# Patient Record
Sex: MALE | Race: WHITE | NOT HISPANIC OR LATINO | Employment: OTHER | URBAN - METROPOLITAN AREA
[De-identification: names, ages, dates, MRNs, and addresses within clinical notes are randomized per-mention and may not be internally consistent; named-entity substitution may affect disease eponyms.]

---

## 2019-04-04 ENCOUNTER — HOSPITAL ENCOUNTER (EMERGENCY)
Facility: HOSPITAL | Age: 69
Discharge: HOME | End: 2019-04-04
Attending: EMERGENCY MEDICINE
Payer: COMMERCIAL

## 2019-04-04 ENCOUNTER — TELEPHONE (OUTPATIENT)
Dept: PHYSICAL THERAPY | Facility: OTHER | Age: 69
End: 2019-04-04

## 2019-04-04 VITALS
DIASTOLIC BLOOD PRESSURE: 78 MMHG | WEIGHT: 165 LBS | SYSTOLIC BLOOD PRESSURE: 145 MMHG | RESPIRATION RATE: 18 BRPM | HEART RATE: 71 BPM | TEMPERATURE: 97.9 F | OXYGEN SATURATION: 99 %

## 2019-04-04 DIAGNOSIS — M54.31 SCIATICA OF RIGHT SIDE: Primary | ICD-10-CM

## 2019-04-04 PROCEDURE — 99283 EMERGENCY DEPT VISIT LOW MDM: CPT

## 2019-04-04 PROCEDURE — 99284 EMERGENCY DEPT VISIT MOD MDM: CPT | Performed by: PHYSICIAN ASSISTANT

## 2019-04-04 RX ORDER — CYCLOBENZAPRINE HCL 10 MG
10 TABLET ORAL ONCE
Status: COMPLETED | OUTPATIENT
Start: 2019-04-04 | End: 2019-04-04

## 2019-04-04 RX ORDER — CYCLOBENZAPRINE HCL 5 MG
TABLET ORAL
Qty: 12 TABLET | Refills: 0 | Status: SHIPPED | OUTPATIENT
Start: 2019-04-04 | End: 2019-04-08 | Stop reason: ALTCHOICE

## 2019-04-04 RX ORDER — PREDNISONE 20 MG/1
60 TABLET ORAL ONCE
Status: COMPLETED | OUTPATIENT
Start: 2019-04-04 | End: 2019-04-04

## 2019-04-04 RX ORDER — PREDNISONE 20 MG/1
40 TABLET ORAL DAILY
Qty: 8 TABLET | Refills: 0 | Status: SHIPPED | OUTPATIENT
Start: 2019-04-04 | End: 2019-04-08 | Stop reason: ALTCHOICE

## 2019-04-04 RX ADMIN — PREDNISONE 60 MG: 20 TABLET ORAL at 13:50

## 2019-04-04 RX ADMIN — CYCLOBENZAPRINE HYDROCHLORIDE 10 MG: 10 TABLET, FILM COATED ORAL at 13:50

## 2019-04-05 ENCOUNTER — TELEPHONE (OUTPATIENT)
Dept: PHYSICAL THERAPY | Facility: OTHER | Age: 69
End: 2019-04-05

## 2019-04-08 ENCOUNTER — OFFICE VISIT (OUTPATIENT)
Dept: INTERNAL MEDICINE CLINIC | Facility: CLINIC | Age: 69
End: 2019-04-08
Payer: COMMERCIAL

## 2019-04-08 ENCOUNTER — EVALUATION (OUTPATIENT)
Dept: PHYSICAL THERAPY | Facility: REHABILITATION | Age: 69
End: 2019-04-08
Payer: COMMERCIAL

## 2019-04-08 ENCOUNTER — TELEPHONE (OUTPATIENT)
Dept: PHYSICAL THERAPY | Facility: OTHER | Age: 69
End: 2019-04-08

## 2019-04-08 ENCOUNTER — NURSE TRIAGE (OUTPATIENT)
Dept: PHYSICAL THERAPY | Facility: OTHER | Age: 69
End: 2019-04-08

## 2019-04-08 ENCOUNTER — TRANSCRIBE ORDERS (OUTPATIENT)
Dept: PHYSICAL THERAPY | Facility: REHABILITATION | Age: 69
End: 2019-04-08

## 2019-04-08 VITALS
HEIGHT: 69 IN | SYSTOLIC BLOOD PRESSURE: 98 MMHG | OXYGEN SATURATION: 97 % | TEMPERATURE: 98.4 F | WEIGHT: 199 LBS | BODY MASS INDEX: 29.47 KG/M2 | DIASTOLIC BLOOD PRESSURE: 58 MMHG | HEART RATE: 89 BPM

## 2019-04-08 VITALS — TEMPERATURE: 98.6 F | SYSTOLIC BLOOD PRESSURE: 129 MMHG | DIASTOLIC BLOOD PRESSURE: 71 MMHG | HEART RATE: 89 BPM

## 2019-04-08 DIAGNOSIS — M15.3 POST-TRAUMATIC OSTEOARTHRITIS OF MULTIPLE JOINTS: Primary | ICD-10-CM

## 2019-04-08 DIAGNOSIS — M54.41 ACUTE RIGHT-SIDED LOW BACK PAIN WITH RIGHT-SIDED SCIATICA: Primary | ICD-10-CM

## 2019-04-08 DIAGNOSIS — M25.551 RIGHT HIP PAIN: ICD-10-CM

## 2019-04-08 PROCEDURE — 97162 PT EVAL MOD COMPLEX 30 MIN: CPT | Performed by: PHYSICAL THERAPIST

## 2019-04-08 PROCEDURE — 99203 OFFICE O/P NEW LOW 30 MIN: CPT | Performed by: INTERNAL MEDICINE

## 2019-04-08 PROCEDURE — 97110 THERAPEUTIC EXERCISES: CPT | Performed by: PHYSICAL THERAPIST

## 2019-04-08 PROCEDURE — 3008F BODY MASS INDEX DOCD: CPT | Performed by: INTERNAL MEDICINE

## 2019-04-08 RX ORDER — DILTIAZEM HYDROCHLORIDE 180 MG/1
180 CAPSULE, COATED, EXTENDED RELEASE ORAL DAILY
COMMUNITY
Start: 2013-09-12 | End: 2020-02-07 | Stop reason: SDUPTHER

## 2019-04-09 ENCOUNTER — TELEPHONE (OUTPATIENT)
Dept: INTERNAL MEDICINE CLINIC | Facility: CLINIC | Age: 69
End: 2019-04-09

## 2019-04-10 DIAGNOSIS — G89.29 OTHER CHRONIC PAIN: ICD-10-CM

## 2019-04-10 DIAGNOSIS — M19.90 OSTEOARTHRITIS, UNSPECIFIED OSTEOARTHRITIS TYPE, UNSPECIFIED SITE: Primary | ICD-10-CM

## 2019-04-10 DIAGNOSIS — M25.551 RIGHT HIP PAIN: ICD-10-CM

## 2019-04-10 RX ORDER — HYDROCODONE BITARTRATE AND ACETAMINOPHEN 5; 325 MG/1; MG/1
1 TABLET ORAL EVERY 6 HOURS PRN
Qty: 120 TABLET | Refills: 0 | Status: SHIPPED | OUTPATIENT
Start: 2019-04-10 | End: 2019-04-15 | Stop reason: ALTCHOICE

## 2019-04-11 ENCOUNTER — TELEPHONE (OUTPATIENT)
Dept: INTERNAL MEDICINE CLINIC | Age: 69
End: 2019-04-11

## 2019-04-15 ENCOUNTER — APPOINTMENT (OUTPATIENT)
Dept: PHYSICAL THERAPY | Facility: REHABILITATION | Age: 69
End: 2019-04-15
Payer: COMMERCIAL

## 2019-04-15 ENCOUNTER — OFFICE VISIT (OUTPATIENT)
Dept: INTERNAL MEDICINE CLINIC | Facility: CLINIC | Age: 69
End: 2019-04-15
Payer: MEDICARE

## 2019-04-15 VITALS
DIASTOLIC BLOOD PRESSURE: 60 MMHG | WEIGHT: 190.4 LBS | BODY MASS INDEX: 28.3 KG/M2 | HEART RATE: 75 BPM | OXYGEN SATURATION: 95 % | SYSTOLIC BLOOD PRESSURE: 120 MMHG | TEMPERATURE: 98.7 F

## 2019-04-15 DIAGNOSIS — E11.8 TYPE 2 DIABETES MELLITUS WITH COMPLICATION, WITH LONG-TERM CURRENT USE OF INSULIN (HCC): ICD-10-CM

## 2019-04-15 DIAGNOSIS — Z23 NEED FOR DIPHTHERIA-TETANUS-PERTUSSIS (TDAP) VACCINE: ICD-10-CM

## 2019-04-15 DIAGNOSIS — I10 ESSENTIAL HYPERTENSION: ICD-10-CM

## 2019-04-15 DIAGNOSIS — Z79.4 TYPE 2 DIABETES MELLITUS WITH COMPLICATION, WITH LONG-TERM CURRENT USE OF INSULIN (HCC): ICD-10-CM

## 2019-04-15 DIAGNOSIS — Z23 NEED FOR INFLUENZA VACCINATION: ICD-10-CM

## 2019-04-15 DIAGNOSIS — M25.551 RIGHT HIP PAIN: ICD-10-CM

## 2019-04-15 DIAGNOSIS — E11.8 TYPE 2 DIABETES MELLITUS WITH COMPLICATION, WITHOUT LONG-TERM CURRENT USE OF INSULIN (HCC): Primary | ICD-10-CM

## 2019-04-15 DIAGNOSIS — Z23 NEED FOR PNEUMOCOCCAL VACCINE: ICD-10-CM

## 2019-04-15 DIAGNOSIS — G89.21 CHRONIC PAIN DUE TO TRAUMA: ICD-10-CM

## 2019-04-15 DIAGNOSIS — Z12.11 COLON CANCER SCREENING: ICD-10-CM

## 2019-04-15 LAB — SL AMB POCT HEMOGLOBIN AIC: 9 (ref ?–6.5)

## 2019-04-15 PROCEDURE — 90670 PCV13 VACCINE IM: CPT

## 2019-04-15 PROCEDURE — 83036 HEMOGLOBIN GLYCOSYLATED A1C: CPT | Performed by: INTERNAL MEDICINE

## 2019-04-15 PROCEDURE — 99214 OFFICE O/P EST MOD 30 MIN: CPT | Performed by: INTERNAL MEDICINE

## 2019-04-15 PROCEDURE — 90471 IMMUNIZATION ADMIN: CPT

## 2019-04-15 RX ORDER — TRAMADOL HYDROCHLORIDE 50 MG/1
50 TABLET ORAL EVERY 6 HOURS PRN
Qty: 120 TABLET | Refills: 0 | Status: SHIPPED | OUTPATIENT
Start: 2019-04-15 | End: 2019-05-20 | Stop reason: SDUPTHER

## 2019-04-15 RX ORDER — DULOXETIN HYDROCHLORIDE 20 MG/1
20 CAPSULE, DELAYED RELEASE ORAL DAILY
Qty: 30 CAPSULE | Refills: 2 | Status: SHIPPED | OUTPATIENT
Start: 2019-04-15 | End: 2019-07-20 | Stop reason: SDUPTHER

## 2019-04-16 ENCOUNTER — APPOINTMENT (OUTPATIENT)
Dept: RADIOLOGY | Facility: CLINIC | Age: 69
End: 2019-04-16
Payer: COMMERCIAL

## 2019-04-16 DIAGNOSIS — M25.551 RIGHT HIP PAIN: ICD-10-CM

## 2019-04-16 DIAGNOSIS — G89.21 CHRONIC PAIN DUE TO TRAUMA: ICD-10-CM

## 2019-04-16 PROCEDURE — 73502 X-RAY EXAM HIP UNI 2-3 VIEWS: CPT

## 2019-04-17 ENCOUNTER — TELEPHONE (OUTPATIENT)
Dept: INTERNAL MEDICINE CLINIC | Facility: CLINIC | Age: 69
End: 2019-04-17

## 2019-04-17 DIAGNOSIS — Z12.11 COLON CANCER SCREENING: Primary | ICD-10-CM

## 2019-04-17 DIAGNOSIS — Z12.10 SPECIAL SCREENING FOR MALIGNANT NEOPLASM OF INTESTINE: ICD-10-CM

## 2019-04-18 LAB
ALBUMIN SERPL-MCNC: 4.1 G/DL (ref 3.6–5.1)
ALBUMIN/GLOB SERPL: 1.5 (CALC) (ref 1–2.5)
ALP SERPL-CCNC: 61 U/L (ref 40–115)
ALT SERPL-CCNC: 17 U/L (ref 9–46)
AST SERPL-CCNC: 12 U/L (ref 10–35)
BASOPHILS # BLD AUTO: 73 CELLS/UL (ref 0–200)
BASOPHILS NFR BLD AUTO: 1.1 %
BILIRUB SERPL-MCNC: 1 MG/DL (ref 0.2–1.2)
BUN SERPL-MCNC: 15 MG/DL (ref 7–25)
BUN/CREAT SERPL: ABNORMAL (CALC) (ref 6–22)
CALCIUM SERPL-MCNC: 9.3 MG/DL (ref 8.6–10.3)
CHLORIDE SERPL-SCNC: 99 MMOL/L (ref 98–110)
CHOLEST SERPL-MCNC: 214 MG/DL
CHOLEST/HDLC SERPL: 4.7 (CALC)
CO2 SERPL-SCNC: 27 MMOL/L (ref 20–32)
CREAT SERPL-MCNC: 0.82 MG/DL (ref 0.7–1.18)
EOSINOPHIL # BLD AUTO: 132 CELLS/UL (ref 15–500)
EOSINOPHIL NFR BLD AUTO: 2 %
ERYTHROCYTE [DISTWIDTH] IN BLOOD BY AUTOMATED COUNT: 13.3 % (ref 11–15)
GLOBULIN SER CALC-MCNC: 2.8 G/DL (CALC) (ref 1.9–3.7)
GLUCOSE SERPL-MCNC: 193 MG/DL (ref 65–99)
HBA1C MFR BLD: 8.7 % OF TOTAL HGB
HCT VFR BLD AUTO: 46.2 % (ref 38.5–50)
HDLC SERPL-MCNC: 46 MG/DL
HGB BLD-MCNC: 15.6 G/DL (ref 13.2–17.1)
LDLC SERPL CALC-MCNC: 146 MG/DL (CALC)
LYMPHOCYTES # BLD AUTO: 1993 CELLS/UL (ref 850–3900)
LYMPHOCYTES NFR BLD AUTO: 30.2 %
MCH RBC QN AUTO: 31.4 PG (ref 27–33)
MCHC RBC AUTO-ENTMCNC: 33.8 G/DL (ref 32–36)
MCV RBC AUTO: 93 FL (ref 80–100)
MONOCYTES # BLD AUTO: 554 CELLS/UL (ref 200–950)
MONOCYTES NFR BLD AUTO: 8.4 %
NEUTROPHILS # BLD AUTO: 3848 CELLS/UL (ref 1500–7800)
NEUTROPHILS NFR BLD AUTO: 58.3 %
NONHDLC SERPL-MCNC: 168 MG/DL (CALC)
PLATELET # BLD AUTO: 175 THOUSAND/UL (ref 140–400)
PMV BLD REES-ECKER: 12.2 FL (ref 7.5–12.5)
POTASSIUM SERPL-SCNC: 4.2 MMOL/L (ref 3.5–5.3)
PROT SERPL-MCNC: 6.9 G/DL (ref 6.1–8.1)
RBC # BLD AUTO: 4.97 MILLION/UL (ref 4.2–5.8)
SL AMB EGFR AFRICAN AMERICAN: 104 ML/MIN/1.73M2
SL AMB EGFR NON AFRICAN AMERICAN: 90 ML/MIN/1.73M2
SODIUM SERPL-SCNC: 135 MMOL/L (ref 135–146)
TRIGL SERPL-MCNC: 108 MG/DL
WBC # BLD AUTO: 6.6 THOUSAND/UL (ref 3.8–10.8)

## 2019-04-22 ENCOUNTER — OFFICE VISIT (OUTPATIENT)
Dept: PHYSICAL THERAPY | Facility: REHABILITATION | Age: 69
End: 2019-04-22
Payer: COMMERCIAL

## 2019-04-22 DIAGNOSIS — M54.41 ACUTE RIGHT-SIDED LOW BACK PAIN WITH RIGHT-SIDED SCIATICA: Primary | ICD-10-CM

## 2019-04-22 PROCEDURE — 97140 MANUAL THERAPY 1/> REGIONS: CPT | Performed by: PHYSICAL THERAPIST

## 2019-04-22 PROCEDURE — 97110 THERAPEUTIC EXERCISES: CPT | Performed by: PHYSICAL THERAPIST

## 2019-04-22 PROCEDURE — 97112 NEUROMUSCULAR REEDUCATION: CPT | Performed by: PHYSICAL THERAPIST

## 2019-04-23 ENCOUNTER — OFFICE VISIT (OUTPATIENT)
Dept: INTERNAL MEDICINE CLINIC | Facility: CLINIC | Age: 69
End: 2019-04-23
Payer: COMMERCIAL

## 2019-04-23 VITALS
HEART RATE: 84 BPM | WEIGHT: 189 LBS | OXYGEN SATURATION: 95 % | BODY MASS INDEX: 28.09 KG/M2 | TEMPERATURE: 97.9 F | DIASTOLIC BLOOD PRESSURE: 74 MMHG | SYSTOLIC BLOOD PRESSURE: 122 MMHG

## 2019-04-23 DIAGNOSIS — I48.0 PAROXYSMAL ATRIAL FIBRILLATION (HCC): ICD-10-CM

## 2019-04-23 DIAGNOSIS — I10 ESSENTIAL HYPERTENSION: ICD-10-CM

## 2019-04-23 DIAGNOSIS — G89.29 CHRONIC RIGHT SACROILIAC JOINT PAIN: ICD-10-CM

## 2019-04-23 DIAGNOSIS — E11.40 TYPE 2 DIABETES MELLITUS WITH DIABETIC NEUROPATHY, WITHOUT LONG-TERM CURRENT USE OF INSULIN (HCC): Primary | ICD-10-CM

## 2019-04-23 DIAGNOSIS — M53.3 CHRONIC RIGHT SACROILIAC JOINT PAIN: ICD-10-CM

## 2019-04-23 PROCEDURE — 1160F RVW MEDS BY RX/DR IN RCRD: CPT | Performed by: INTERNAL MEDICINE

## 2019-04-23 PROCEDURE — 99213 OFFICE O/P EST LOW 20 MIN: CPT | Performed by: INTERNAL MEDICINE

## 2019-04-23 PROCEDURE — 3078F DIAST BP <80 MM HG: CPT | Performed by: INTERNAL MEDICINE

## 2019-04-23 PROCEDURE — 3074F SYST BP LT 130 MM HG: CPT | Performed by: INTERNAL MEDICINE

## 2019-04-23 RX ORDER — ALPHA LIPOIC ACID 300 MG
1 CAPSULE ORAL DAILY
Qty: 100 CAPSULE | Refills: 1 | Status: SHIPPED | OUTPATIENT
Start: 2019-04-23 | End: 2019-08-07 | Stop reason: SDUPTHER

## 2019-04-29 ENCOUNTER — OFFICE VISIT (OUTPATIENT)
Dept: PHYSICAL THERAPY | Facility: REHABILITATION | Age: 69
End: 2019-04-29
Payer: COMMERCIAL

## 2019-04-29 DIAGNOSIS — M54.41 ACUTE RIGHT-SIDED LOW BACK PAIN WITH RIGHT-SIDED SCIATICA: Primary | ICD-10-CM

## 2019-04-29 PROCEDURE — 97112 NEUROMUSCULAR REEDUCATION: CPT | Performed by: PHYSICAL THERAPIST

## 2019-04-29 PROCEDURE — 97110 THERAPEUTIC EXERCISES: CPT | Performed by: PHYSICAL THERAPIST

## 2019-04-29 PROCEDURE — 97140 MANUAL THERAPY 1/> REGIONS: CPT | Performed by: PHYSICAL THERAPIST

## 2019-05-06 ENCOUNTER — OFFICE VISIT (OUTPATIENT)
Dept: PHYSICAL THERAPY | Facility: REHABILITATION | Age: 69
End: 2019-05-06
Payer: COMMERCIAL

## 2019-05-06 DIAGNOSIS — M54.41 ACUTE RIGHT-SIDED LOW BACK PAIN WITH RIGHT-SIDED SCIATICA: Primary | ICD-10-CM

## 2019-05-06 PROCEDURE — 97140 MANUAL THERAPY 1/> REGIONS: CPT | Performed by: PHYSICAL THERAPIST

## 2019-05-06 PROCEDURE — 97110 THERAPEUTIC EXERCISES: CPT | Performed by: PHYSICAL THERAPIST

## 2019-05-13 LAB
LEFT EYE DIABETIC RETINOPATHY: NORMAL
RIGHT EYE DIABETIC RETINOPATHY: NORMAL

## 2019-05-13 PROCEDURE — 3072F LOW RISK FOR RETINOPATHY: CPT | Performed by: INTERNAL MEDICINE

## 2019-05-15 ENCOUNTER — OFFICE VISIT (OUTPATIENT)
Dept: PHYSICAL THERAPY | Facility: REHABILITATION | Age: 69
End: 2019-05-15
Payer: COMMERCIAL

## 2019-05-15 DIAGNOSIS — M54.41 ACUTE RIGHT-SIDED LOW BACK PAIN WITH RIGHT-SIDED SCIATICA: Primary | ICD-10-CM

## 2019-05-15 PROCEDURE — 97140 MANUAL THERAPY 1/> REGIONS: CPT | Performed by: PHYSICAL THERAPIST

## 2019-05-15 PROCEDURE — 97112 NEUROMUSCULAR REEDUCATION: CPT | Performed by: PHYSICAL THERAPIST

## 2019-05-15 PROCEDURE — 97110 THERAPEUTIC EXERCISES: CPT | Performed by: PHYSICAL THERAPIST

## 2019-05-16 RX ORDER — ALBUTEROL SULFATE 90 UG/1
AEROSOL, METERED RESPIRATORY (INHALATION)
COMMUNITY
Start: 2019-03-18 | End: 2019-05-20 | Stop reason: ALTCHOICE

## 2019-05-20 ENCOUNTER — OFFICE VISIT (OUTPATIENT)
Dept: INTERNAL MEDICINE CLINIC | Facility: CLINIC | Age: 69
End: 2019-05-20
Payer: COMMERCIAL

## 2019-05-20 VITALS
DIASTOLIC BLOOD PRESSURE: 56 MMHG | SYSTOLIC BLOOD PRESSURE: 106 MMHG | OXYGEN SATURATION: 96 % | HEIGHT: 69 IN | BODY MASS INDEX: 27.4 KG/M2 | WEIGHT: 185 LBS | TEMPERATURE: 98.2 F | HEART RATE: 83 BPM

## 2019-05-20 DIAGNOSIS — E11.40 TYPE 2 DIABETES MELLITUS WITH DIABETIC NEUROPATHY, WITHOUT LONG-TERM CURRENT USE OF INSULIN (HCC): Primary | ICD-10-CM

## 2019-05-20 DIAGNOSIS — M19.90 ARTHRITIS: ICD-10-CM

## 2019-05-20 DIAGNOSIS — M25.551 RIGHT HIP PAIN: ICD-10-CM

## 2019-05-20 DIAGNOSIS — G89.21 CHRONIC PAIN DUE TO TRAUMA: ICD-10-CM

## 2019-05-20 DIAGNOSIS — I95.2 HYPOTENSION DUE TO DRUGS: ICD-10-CM

## 2019-05-20 PROCEDURE — 99214 OFFICE O/P EST MOD 30 MIN: CPT | Performed by: INTERNAL MEDICINE

## 2019-05-20 PROCEDURE — 1036F TOBACCO NON-USER: CPT | Performed by: INTERNAL MEDICINE

## 2019-05-20 PROCEDURE — 3008F BODY MASS INDEX DOCD: CPT | Performed by: INTERNAL MEDICINE

## 2019-05-20 RX ORDER — TRAMADOL HYDROCHLORIDE 50 MG/1
50 TABLET ORAL EVERY 6 HOURS PRN
Qty: 120 TABLET | Refills: 0 | Status: SHIPPED | OUTPATIENT
Start: 2019-05-20 | End: 2019-06-19

## 2019-05-22 ENCOUNTER — OFFICE VISIT (OUTPATIENT)
Dept: PHYSICAL THERAPY | Facility: REHABILITATION | Age: 69
End: 2019-05-22
Payer: COMMERCIAL

## 2019-05-22 DIAGNOSIS — M54.41 ACUTE RIGHT-SIDED LOW BACK PAIN WITH RIGHT-SIDED SCIATICA: Primary | ICD-10-CM

## 2019-05-22 LAB — B BURGDOR AB SER IA-ACNC: <0.9 INDEX

## 2019-05-22 PROCEDURE — 97110 THERAPEUTIC EXERCISES: CPT | Performed by: PHYSICAL THERAPIST

## 2019-05-22 PROCEDURE — 97112 NEUROMUSCULAR REEDUCATION: CPT | Performed by: PHYSICAL THERAPIST

## 2019-05-22 PROCEDURE — 97140 MANUAL THERAPY 1/> REGIONS: CPT | Performed by: PHYSICAL THERAPIST

## 2019-05-29 ENCOUNTER — OFFICE VISIT (OUTPATIENT)
Dept: PHYSICAL THERAPY | Facility: REHABILITATION | Age: 69
End: 2019-05-29
Payer: COMMERCIAL

## 2019-05-29 DIAGNOSIS — M54.41 ACUTE RIGHT-SIDED LOW BACK PAIN WITH RIGHT-SIDED SCIATICA: Primary | ICD-10-CM

## 2019-05-29 PROCEDURE — 97110 THERAPEUTIC EXERCISES: CPT | Performed by: PHYSICAL THERAPIST

## 2019-05-29 PROCEDURE — 97140 MANUAL THERAPY 1/> REGIONS: CPT | Performed by: PHYSICAL THERAPIST

## 2019-07-20 DIAGNOSIS — Z79.4 TYPE 2 DIABETES MELLITUS WITH COMPLICATION, WITH LONG-TERM CURRENT USE OF INSULIN (HCC): ICD-10-CM

## 2019-07-20 DIAGNOSIS — G89.21 CHRONIC PAIN DUE TO TRAUMA: ICD-10-CM

## 2019-07-20 DIAGNOSIS — M25.551 RIGHT HIP PAIN: ICD-10-CM

## 2019-07-20 DIAGNOSIS — E11.8 TYPE 2 DIABETES MELLITUS WITH COMPLICATION, WITH LONG-TERM CURRENT USE OF INSULIN (HCC): ICD-10-CM

## 2019-07-22 RX ORDER — DULOXETIN HYDROCHLORIDE 20 MG/1
CAPSULE, DELAYED RELEASE ORAL
Qty: 30 CAPSULE | Refills: 2 | Status: SHIPPED | OUTPATIENT
Start: 2019-07-22 | End: 2019-12-23 | Stop reason: ALTCHOICE

## 2019-08-05 RX ORDER — DILTIAZEM HYDROCHLORIDE 240 MG/1
CAPSULE, COATED, EXTENDED RELEASE ORAL
COMMUNITY
Start: 2019-05-20 | End: 2019-08-07 | Stop reason: SDUPTHER

## 2019-08-05 RX ORDER — CARBOXYMETHYLCELLULOSE SODIUM 0.5 %
DROPS OPHTHALMIC (EYE) AS NEEDED
COMMUNITY
Start: 2019-06-07 | End: 2021-10-12 | Stop reason: ALTCHOICE

## 2019-08-07 ENCOUNTER — OFFICE VISIT (OUTPATIENT)
Dept: INTERNAL MEDICINE CLINIC | Facility: CLINIC | Age: 69
End: 2019-08-07
Payer: COMMERCIAL

## 2019-08-07 VITALS
HEART RATE: 88 BPM | WEIGHT: 195.2 LBS | BODY MASS INDEX: 28.91 KG/M2 | HEIGHT: 69 IN | DIASTOLIC BLOOD PRESSURE: 78 MMHG | TEMPERATURE: 98.6 F | SYSTOLIC BLOOD PRESSURE: 126 MMHG | OXYGEN SATURATION: 98 %

## 2019-08-07 DIAGNOSIS — I48.20 CHRONIC ATRIAL FIBRILLATION (HCC): ICD-10-CM

## 2019-08-07 DIAGNOSIS — I95.2 HYPOTENSION DUE TO DRUGS: ICD-10-CM

## 2019-08-07 DIAGNOSIS — E11.40 TYPE 2 DIABETES MELLITUS WITH DIABETIC NEUROPATHY, WITHOUT LONG-TERM CURRENT USE OF INSULIN (HCC): Primary | ICD-10-CM

## 2019-08-07 DIAGNOSIS — I10 ESSENTIAL HYPERTENSION: ICD-10-CM

## 2019-08-07 LAB — SL AMB POCT HEMOGLOBIN AIC: 6.6 (ref ?–6.5)

## 2019-08-07 PROCEDURE — 3074F SYST BP LT 130 MM HG: CPT | Performed by: INTERNAL MEDICINE

## 2019-08-07 PROCEDURE — 1160F RVW MEDS BY RX/DR IN RCRD: CPT | Performed by: INTERNAL MEDICINE

## 2019-08-07 PROCEDURE — 3008F BODY MASS INDEX DOCD: CPT | Performed by: INTERNAL MEDICINE

## 2019-08-07 PROCEDURE — 99214 OFFICE O/P EST MOD 30 MIN: CPT | Performed by: INTERNAL MEDICINE

## 2019-08-07 PROCEDURE — 83036 HEMOGLOBIN GLYCOSYLATED A1C: CPT | Performed by: INTERNAL MEDICINE

## 2019-08-07 RX ORDER — TRAMADOL HYDROCHLORIDE 50 MG/1
TABLET ORAL
COMMUNITY
End: 2019-12-23 | Stop reason: SDUPTHER

## 2019-08-07 RX ORDER — CARBOXYMETHYLCELLULOSE SODIUM 5 MG/ML
SOLUTION/ DROPS OPHTHALMIC AS NEEDED
COMMUNITY
End: 2019-12-23 | Stop reason: ALTCHOICE

## 2019-08-07 RX ORDER — ALPHA LIPOIC ACID 300 MG
1 CAPSULE ORAL DAILY
Qty: 100 CAPSULE | Refills: 1 | Status: SHIPPED | OUTPATIENT
Start: 2019-08-07 | End: 2019-12-23 | Stop reason: ALTCHOICE

## 2019-08-07 NOTE — ASSESSMENT & PLAN NOTE
Reviewed the rationale of anticoagulation with the patient but he refuses to take systemic anticoagulation a choosing instead to proceed with aspirin alone    It was stated to the patient that aspirin alone is not sufficient protection for stroke prevention in atrial fibrillation

## 2019-08-07 NOTE — PROGRESS NOTES
Assessment/Plan:    Essential hypertension  Blood pressure is nicely controlled on current medications  Chronic atrial fibrillation ([facility])  Reviewed the rationale of anticoagulation with the patient but he refuses to take systemic anticoagulation a choosing instead to proceed with aspirin alone  It was stated to the patient that aspirin alone is not sufficient protection for stroke prevention in atrial fibrillation    Type 2 diabetes mellitus with neurologic complication, without long-term current use of insulin ([facility])  Lab Results   Component Value Date    HGBA1C 6 6 (A) 08/07/2019     Good diabetic control on metformin 1000 twice daily  Patient follows a very prudent diet avoiding sugary snacks, beverages and excessive carbohydrates  No results for input(s): POCGLU in the last 72 hours  Blood Sugar Average: Last 72 hrs:      Hypotension due to drugs  This has resolved with adjustment in his diltiazem dose  We will continue to monitor  Diagnoses and all orders for this visit:    Type 2 diabetes mellitus with diabetic neuropathy, without long-term current use of insulin ([facility])  -     POCT hemoglobin A1c  -     Benfotiamine 150 MG CAPS; Take 1 capsule (150 mg total) by mouth 2 (two) times a day for 180 days  -     Alpha-Lipoic Acid 300 MG CAPS; Take 1 capsule (300 mg total) by mouth daily  -     Comprehensive metabolic panel; Future  -     CBC; Future  -     Hemoglobin A1C; Future  -     Lipid panel; Future    Essential hypertension    Chronic atrial fibrillation (HCC)    Hypotension due to drugs    Other orders  -     Discontinue: DILTIAZEM  MG 24 hr capsule  -     ULTRA FRESH 0 5 % SOLN; as needed   -     metFORMIN (GLUCOPHAGE) 1000 MG tablet;  Take 1,000 mg by mouth 2 (two) times a day   -     traMADol (ULTRAM) 50 mg tablet; tramadol 50 mg tablet   TAKE 1 TABLET EVERY 6 HOURS AS NEEDED FOR MODERATE PAIN FOR UP TO 30 DAYS  -     carboxymethylcellulose 0 5 % SOLN; as needed   -     COENZYME Q10 PO; Take 1 Can by mouth daily          Subjective:      Patient ID: Jarvis Arnold is a 78 y o  male  The patient presents to the office for follow-up visit  In general he is doing well  He was recently seen at the [facility] where his blood pressure was noted to be low  His diltiazem dose was adjusted from 240 to 180 mg daily  His blood pressure appears to have improved some  In reviewing his previous visits his blood pressure had been somewhat inconsistent with readings ranging from 286 systolic to 994 and diastolics ranging from 60 to 80  He had never been symptomatic  He denies any chest pain, palpitations, dyspnea, lightheadedness, unsteadiness, numbness tingling or weakness, slurred speech or visual symptoms  Hemoglobin A1c taken in the office now is 6 6% He is taking metformin 1000 mg b i d  He denies any GI side effects from the metformin  Blood pressure appears to be nicely controlled  Heart rate appears stable as well  He is not on systemic anticoagulation  He had been on warfarin several years ago and had a great deal of difficulty with warfarin and refuses to take any type of anti coagulation other than aspirin        Family History   Problem Relation Age of Onset    Dementia Mother     Pneumonia Father         Acinetobacter    Diabetes Father     Colon cancer Sister     Lung cancer Sister     Breast cancer Sister     Lung cancer Brother      Social History     Socioeconomic History    Marital status: /Civil Union     Spouse name: Not on file    Number of children: Not on file    Years of education: Not on file    Highest education level: Not on file   Occupational History    Not on file   Social Needs    Financial resource strain: Not on file    Food insecurity:     Worry: Not on file     Inability: Not on file    Transportation needs:     Medical: Not on file     Non-medical: Not on file   Tobacco Use    Smoking status: Former Smoker     Types: Cigarettes     Last attempt to quit: 1977     Years since quittin 6    Smokeless tobacco: Never Used   Substance and Sexual Activity    Alcohol use: Not Currently    Drug use: Not Currently    Sexual activity: Not on file   Lifestyle    Physical activity:     Days per week: Not on file     Minutes per session: Not on file    Stress: Not on file   Relationships    Social connections:     Talks on phone: Not on file     Gets together: Not on file     Attends Zoroastrian service: Not on file     Active member of club or organization: Not on file     Attends meetings of clubs or organizations: Not on file     Relationship status: Not on file    Intimate partner violence:     Fear of current or ex partner: Not on file     Emotionally abused: Not on file     Physically abused: Not on file     Forced sexual activity: Not on file   Other Topics Concern    Not on file   Social History Narrative    Not on file     Past Medical History:   Diagnosis Date    Arthritis     Atrial fibrillation ([facility] )     Diabetes 1 5, 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[facility] )     Hepatitis C, acute     HTN (hypertension)     Neuropathy        Current Outpatient Medications:     aspirin 81 MG tablet, Take 162 mg by mouth 2 (two) times a day , Disp: , Rfl:     carboxymethylcellulose 0 5 % SOLN, as needed , Disp: , Rfl:     COENZYME Q10 PO, Take 1 Can by mouth daily, Disp: , Rfl:     diltiazem (CARDIZEM CD) 180 mg 24 hr capsule, Take 240 mg by mouth daily , Disp: , Rfl:     DULoxetine (CYMBALTA) 20 mg capsule, TAKE 1 CAPSULE (20 MG TOTAL) BY MOUTH DAILY, Disp: 30 capsule, Rfl: 2    metFORMIN (GLUCOPHAGE) 1000 MG tablet, Take 1,000 mg by mouth 2 (two) times a day , Disp: , Rfl:     traMADol (ULTRAM) 50 mg tablet, tramadol 50 mg tablet  TAKE 1 TABLET EVERY 6 HOURS AS NEEDED FOR MODERATE PAIN FOR UP TO 30 DAYS, Disp: , Rfl:     ULTRA FRESH 0 5 % SOLN, as needed , Disp: , Rfl:     Alpha-Lipoic Acid 300 MG CAPS, Take 1 capsule (300 mg total) by mouth daily, Disp: 100 capsule, Rfl: 1    Benfotiamine 150 MG CAPS, Take 1 capsule (150 mg total) by mouth 2 (two) times a day for 180 days, Disp: 60 capsule, Rfl: 5  No Known Allergies  Past Surgical History:   Procedure Laterality Date    CYSTOSTOMY W/ BLADDER DILATION      basket extraction of calculus    FEMUR FRACTURE SURGERY      LITHOTRIPSY      PATELLA FRACTURE SURGERY      STONE ANALYSIS (HISTORICAL)           Review of Systems   Constitutional: Negative  Eyes: Negative  Respiratory: Negative  Cardiovascular: Negative  Gastrointestinal: Negative  Endocrine: Negative  Genitourinary: Negative  Musculoskeletal: Positive for gait problem  Skin: Negative  Neurological: Positive for numbness (Mild sensory neuropathy of the lower extremities)  Hematological: Negative  Psychiatric/Behavioral: Negative  Objective:      /78 (BP Location: Left arm, Patient Position: Sitting, Cuff Size: Standard)   Pulse 88   Temp 98 6 °F (37 °C) (Oral)   Ht 5' 8 58" (1 742 m) Comment: with boots  Wt 88 5 kg (195 lb 3 2 oz) Comment: with boots  SpO2 98%   BMI 29 18 kg/m²          Physical Exam   Constitutional: He is oriented to person, place, and time  He appears well-developed and well-nourished  No distress  HENT:   Head: Normocephalic and atraumatic  Mouth/Throat: Oropharynx is clear and moist    Eyes: Pupils are equal, round, and reactive to light  Conjunctivae are normal  No scleral icterus  Neck: Neck supple  No JVD present  Carotid bruit is not present  No tracheal deviation present  No thyromegaly present  Cardiovascular: S1 normal, S2 normal, normal heart sounds and normal pulses  An irregularly irregular rhythm present  PMI is not displaced  No murmur heard  Pulmonary/Chest: Effort normal and breath sounds normal  No respiratory distress  He has no wheezes  He has no rales  Abdominal: Soft  Bowel sounds are normal  He exhibits no distension  There is no tenderness  Musculoskeletal: He exhibits no edema or tenderness  Right-sided back pain much improved   Lymphadenopathy:     He has no cervical adenopathy  Neurological: He is alert and oriented to person, place, and time  No cranial nerve deficit  No focal motor deficits  Skin: Skin is warm  Capillary refill takes less than 2 seconds  He is not diaphoretic  No erythema  No pallor  Vitals reviewed

## 2019-08-09 LAB — MICROALBUMIN UR-MCNC: 1.5 MG/DL

## 2019-08-17 ENCOUNTER — APPOINTMENT (EMERGENCY)
Dept: RADIOLOGY | Facility: HOSPITAL | Age: 69
End: 2019-08-17
Payer: COMMERCIAL

## 2019-08-17 ENCOUNTER — HOSPITAL ENCOUNTER (EMERGENCY)
Facility: HOSPITAL | Age: 69
Discharge: HOME | End: 2019-08-17
Attending: EMERGENCY MEDICINE
Payer: COMMERCIAL

## 2019-08-17 VITALS
HEART RATE: 96 BPM | BODY MASS INDEX: 29.79 KG/M2 | OXYGEN SATURATION: 96 % | DIASTOLIC BLOOD PRESSURE: 78 MMHG | SYSTOLIC BLOOD PRESSURE: 160 MMHG | RESPIRATION RATE: 18 BRPM | TEMPERATURE: 97.6 F | WEIGHT: 199.3 LBS

## 2019-08-17 DIAGNOSIS — E27.8 ADRENAL NODULE (HCC): ICD-10-CM

## 2019-08-17 DIAGNOSIS — R93.2 ABNORMAL FINDINGS ON DIAGNOSTIC IMAGING OF LIVER: ICD-10-CM

## 2019-08-17 DIAGNOSIS — N20.0 RENAL STONE: ICD-10-CM

## 2019-08-17 DIAGNOSIS — R10.9 FLANK PAIN: Primary | ICD-10-CM

## 2019-08-17 DIAGNOSIS — R31.9 HEMATURIA: ICD-10-CM

## 2019-08-17 LAB
ANION GAP SERPL CALCULATED.3IONS-SCNC: 4 MMOL/L (ref 4–13)
BACTERIA UR QL AUTO: ABNORMAL /HPF
BASOPHILS # BLD AUTO: 0.04 THOUSANDS/ΜL (ref 0–0.1)
BASOPHILS NFR BLD AUTO: 1 % (ref 0–1)
BILIRUB UR QL STRIP: NEGATIVE
BUN SERPL-MCNC: 18 MG/DL (ref 5–25)
CALCIUM SERPL-MCNC: 8.8 MG/DL (ref 8.3–10.1)
CHLORIDE SERPL-SCNC: 107 MMOL/L (ref 100–108)
CLARITY UR: ABNORMAL
CO2 SERPL-SCNC: 28 MMOL/L (ref 21–32)
COLOR UR: YELLOW
COLOR, POC: NORMAL
CREAT SERPL-MCNC: 1.08 MG/DL (ref 0.6–1.3)
EOSINOPHIL # BLD AUTO: 0.09 THOUSAND/ΜL (ref 0–0.61)
EOSINOPHIL NFR BLD AUTO: 1 % (ref 0–6)
ERYTHROCYTE [DISTWIDTH] IN BLOOD BY AUTOMATED COUNT: 13.2 % (ref 11.6–15.1)
GFR SERPL CREATININE-BSD FRML MDRD: 69 ML/MIN/1.73SQ M
GLUCOSE SERPL-MCNC: 143 MG/DL (ref 65–140)
GLUCOSE UR STRIP-MCNC: ABNORMAL MG/DL
HCT VFR BLD AUTO: 37.3 % (ref 36.5–49.3)
HGB BLD-MCNC: 12.5 G/DL (ref 12–17)
HGB UR QL STRIP.AUTO: ABNORMAL
HYALINE CASTS #/AREA URNS LPF: ABNORMAL /LPF
IMM GRANULOCYTES # BLD AUTO: 0.03 THOUSAND/UL (ref 0–0.2)
IMM GRANULOCYTES NFR BLD AUTO: 1 % (ref 0–2)
KETONES UR STRIP-MCNC: ABNORMAL MG/DL
LEUKOCYTE ESTERASE UR QL STRIP: NEGATIVE
LYMPHOCYTES # BLD AUTO: 1.84 THOUSANDS/ΜL (ref 0.6–4.47)
LYMPHOCYTES NFR BLD AUTO: 30 % (ref 14–44)
MCH RBC QN AUTO: 32.2 PG (ref 26.8–34.3)
MCHC RBC AUTO-ENTMCNC: 33.5 G/DL (ref 31.4–37.4)
MCV RBC AUTO: 96 FL (ref 82–98)
MONOCYTES # BLD AUTO: 0.53 THOUSAND/ΜL (ref 0.17–1.22)
MONOCYTES NFR BLD AUTO: 9 % (ref 4–12)
NEUTROPHILS # BLD AUTO: 3.7 THOUSANDS/ΜL (ref 1.85–7.62)
NEUTS SEG NFR BLD AUTO: 58 % (ref 43–75)
NITRITE UR QL STRIP: NEGATIVE
NON-SQ EPI CELLS URNS QL MICRO: ABNORMAL /HPF
NRBC BLD AUTO-RTO: 0 /100 WBCS
PH UR STRIP.AUTO: 7 [PH] (ref 4.5–8)
PLATELET # BLD AUTO: 178 THOUSANDS/UL (ref 149–390)
PMV BLD AUTO: 11.4 FL (ref 8.9–12.7)
POTASSIUM SERPL-SCNC: 4.2 MMOL/L (ref 3.5–5.3)
PROT UR STRIP-MCNC: ABNORMAL MG/DL
RBC # BLD AUTO: 3.88 MILLION/UL (ref 3.88–5.62)
RBC #/AREA URNS AUTO: ABNORMAL /HPF
SODIUM SERPL-SCNC: 139 MMOL/L (ref 136–145)
SP GR UR STRIP.AUTO: 1.02 (ref 1–1.03)
UROBILINOGEN UR QL STRIP.AUTO: 0.2 E.U./DL
WBC # BLD AUTO: 6.23 THOUSAND/UL (ref 4.31–10.16)
WBC #/AREA URNS AUTO: ABNORMAL /HPF

## 2019-08-17 PROCEDURE — 99284 EMERGENCY DEPT VISIT MOD MDM: CPT | Performed by: EMERGENCY MEDICINE

## 2019-08-17 PROCEDURE — 74177 CT ABD & PELVIS W/CONTRAST: CPT

## 2019-08-17 PROCEDURE — 81001 URINALYSIS AUTO W/SCOPE: CPT

## 2019-08-17 PROCEDURE — 99284 EMERGENCY DEPT VISIT MOD MDM: CPT

## 2019-08-17 PROCEDURE — 36415 COLL VENOUS BLD VENIPUNCTURE: CPT | Performed by: EMERGENCY MEDICINE

## 2019-08-17 PROCEDURE — 80048 BASIC METABOLIC PNL TOTAL CA: CPT | Performed by: EMERGENCY MEDICINE

## 2019-08-17 PROCEDURE — 85025 COMPLETE CBC W/AUTO DIFF WBC: CPT | Performed by: EMERGENCY MEDICINE

## 2019-08-17 RX ORDER — ACETAMINOPHEN 325 MG/1
975 TABLET ORAL ONCE
Status: COMPLETED | OUTPATIENT
Start: 2019-08-17 | End: 2019-08-17

## 2019-08-17 RX ADMIN — ACETAMINOPHEN 975 MG: 325 TABLET ORAL at 17:42

## 2019-08-17 RX ADMIN — IOHEXOL 100 ML: 350 INJECTION, SOLUTION INTRAVENOUS at 19:15

## 2019-08-17 NOTE — ED NOTES
Pt ringing; "I came here for a CT and I am still waiting " Pt is next for [address] Carito Tineo RN  08/17/19 4445
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
## 2019-09-06 ENCOUNTER — OFFICE VISIT (OUTPATIENT)
Dept: INTERNAL MEDICINE CLINIC | Facility: CLINIC | Age: 69
End: 2019-09-06
Payer: COMMERCIAL

## 2019-09-06 VITALS
HEART RATE: 83 BPM | OXYGEN SATURATION: 98 % | BODY MASS INDEX: 29.6 KG/M2 | DIASTOLIC BLOOD PRESSURE: 80 MMHG | WEIGHT: 198 LBS | SYSTOLIC BLOOD PRESSURE: 122 MMHG | TEMPERATURE: 97.8 F

## 2019-09-06 DIAGNOSIS — E11.40 TYPE 2 DIABETES MELLITUS WITH DIABETIC NEUROPATHY, WITHOUT LONG-TERM CURRENT USE OF INSULIN (HCC): ICD-10-CM

## 2019-09-06 DIAGNOSIS — R31.0 HEMATURIA, GROSS: Primary | ICD-10-CM

## 2019-09-06 LAB — SL AMB POCT HEMOGLOBIN AIC: 6.6 (ref ?–6.5)

## 2019-09-06 PROCEDURE — 83036 HEMOGLOBIN GLYCOSYLATED A1C: CPT | Performed by: INTERNAL MEDICINE

## 2019-09-06 PROCEDURE — 99213 OFFICE O/P EST LOW 20 MIN: CPT | Performed by: INTERNAL MEDICINE
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
## 2019-10-17 ENCOUNTER — OFFICE VISIT (OUTPATIENT)
Dept: INTERNAL MEDICINE CLINIC | Facility: CLINIC | Age: 69
End: 2019-10-17
Payer: COMMERCIAL

## 2019-10-17 VITALS
SYSTOLIC BLOOD PRESSURE: 117 MMHG | BODY MASS INDEX: 29.51 KG/M2 | HEIGHT: 69 IN | OXYGEN SATURATION: 98 % | HEART RATE: 85 BPM | DIASTOLIC BLOOD PRESSURE: 64 MMHG | TEMPERATURE: 98.1 F | WEIGHT: 199.2 LBS

## 2019-10-17 DIAGNOSIS — N20.0 BILATERAL NEPHROLITHIASIS: ICD-10-CM

## 2019-10-17 DIAGNOSIS — G89.29 CHRONIC MIDLINE LOW BACK PAIN WITHOUT SCIATICA: ICD-10-CM

## 2019-10-17 DIAGNOSIS — M54.50 CHRONIC MIDLINE LOW BACK PAIN WITHOUT SCIATICA: ICD-10-CM

## 2019-10-17 DIAGNOSIS — Z86.19 HEPATITIS C VIRUS INFECTION RESOLVED AFTER ANTIVIRAL DRUG THERAPY: ICD-10-CM

## 2019-10-17 DIAGNOSIS — R31.0 HEMATURIA, GROSS: Primary | ICD-10-CM

## 2019-10-17 PROCEDURE — 99214 OFFICE O/P EST MOD 30 MIN: CPT | Performed by: INTERNAL MEDICINE

## 2019-10-17 RX ORDER — FLUTICASONE PROPIONATE 50 MCG
2 SPRAY, SUSPENSION (ML) NASAL DAILY
Refills: 3 | COMMUNITY
Start: 2019-09-27 | End: 2020-10-23 | Stop reason: ALTCHOICE

## 2019-10-17 NOTE — ASSESSMENT & PLAN NOTE
Will continue to monitor  Patient states hematuria is intermittent  He denies any flank pain at this time  May need evaluation by Urology  CT scan in August revealed bilateral nephrolithiasis with noted presence also of a left adrenal nodule  Follow-up is planned in 1 year
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
## 2019-10-17 NOTE — ASSESSMENT & PLAN NOTE
Encouraged the patient to maintain adequate fluid intake    He should contact the office should he experience experiencing any increasingly severe flank pain or worsening hematuria

## 2019-10-17 NOTE — ASSESSMENT & PLAN NOTE
Clinically stable  We will continue to monitor his hepatic function  Recent CT of the abdomen pelvis disclosed a lobular contour to his liver suggestive of possible cirrhosis

## 2019-10-23 LAB
ALBUMIN SERPL-MCNC: 4.2 G/DL (ref 3.6–5.1)
ALBUMIN/GLOB SERPL: 1.6 (CALC) (ref 1–2.5)
ALP SERPL-CCNC: 65 U/L (ref 40–115)
ALT SERPL-CCNC: 21 U/L (ref 9–46)
APPEARANCE UR: CLEAR
AST SERPL-CCNC: 16 U/L (ref 10–35)
BASOPHILS # BLD AUTO: 37 CELLS/UL (ref 0–200)
BASOPHILS NFR BLD AUTO: 0.5 %
BILIRUB SERPL-MCNC: 0.7 MG/DL (ref 0.2–1.2)
BILIRUB UR QL STRIP: NEGATIVE
BUN SERPL-MCNC: 13 MG/DL (ref 7–25)
BUN/CREAT SERPL: ABNORMAL (CALC) (ref 6–22)
CALCIUM SERPL-MCNC: 9.1 MG/DL (ref 8.6–10.3)
CHLORIDE SERPL-SCNC: 102 MMOL/L (ref 98–110)
CO2 SERPL-SCNC: 28 MMOL/L (ref 20–32)
COLOR UR: YELLOW
CREAT SERPL-MCNC: 0.73 MG/DL (ref 0.7–1.18)
EOSINOPHIL # BLD AUTO: 170 CELLS/UL (ref 15–500)
EOSINOPHIL NFR BLD AUTO: 2.3 %
ERYTHROCYTE [DISTWIDTH] IN BLOOD BY AUTOMATED COUNT: 12.6 % (ref 11–15)
GLOBULIN SER CALC-MCNC: 2.7 G/DL (CALC) (ref 1.9–3.7)
GLUCOSE SERPL-MCNC: 120 MG/DL (ref 65–99)
GLUCOSE UR QL STRIP: NEGATIVE
HCT VFR BLD AUTO: 40.7 % (ref 38.5–50)
HGB BLD-MCNC: 13.9 G/DL (ref 13.2–17.1)
HGB UR QL STRIP: NEGATIVE
KETONES UR QL STRIP: ABNORMAL
LEUKOCYTE ESTERASE UR QL STRIP: NEGATIVE
LYMPHOCYTES # BLD AUTO: 1746 CELLS/UL (ref 850–3900)
LYMPHOCYTES NFR BLD AUTO: 23.6 %
MCH RBC QN AUTO: 31 PG (ref 27–33)
MCHC RBC AUTO-ENTMCNC: 34.2 G/DL (ref 32–36)
MCV RBC AUTO: 90.8 FL (ref 80–100)
MONOCYTES # BLD AUTO: 688 CELLS/UL (ref 200–950)
MONOCYTES NFR BLD AUTO: 9.3 %
NEUTROPHILS # BLD AUTO: 4758 CELLS/UL (ref 1500–7800)
NEUTROPHILS NFR BLD AUTO: 64.3 %
NITRITE UR QL STRIP: NEGATIVE
PH UR STRIP: 6.5 [PH] (ref 5–8)
PLATELET # BLD AUTO: 213 THOUSAND/UL (ref 140–400)
PMV BLD REES-ECKER: 11.6 FL (ref 7.5–12.5)
POTASSIUM SERPL-SCNC: 4.4 MMOL/L (ref 3.5–5.3)
PROT SERPL-MCNC: 6.9 G/DL (ref 6.1–8.1)
PROT UR QL STRIP: NEGATIVE
RBC # BLD AUTO: 4.48 MILLION/UL (ref 4.2–5.8)
SL AMB EGFR AFRICAN AMERICAN: 109 ML/MIN/1.73M2
SL AMB EGFR NON AFRICAN AMERICAN: 94 ML/MIN/1.73M2
SODIUM SERPL-SCNC: 137 MMOL/L (ref 135–146)
SP GR UR STRIP: 1.02 (ref 1–1.03)
URATE SERPL-MCNC: 5.7 MG/DL (ref 4–8)
WBC # BLD AUTO: 7.4 THOUSAND/UL (ref 3.8–10.8)

## 2019-11-20 ENCOUNTER — TELEPHONE (OUTPATIENT)
Dept: INTERNAL MEDICINE CLINIC | Facility: CLINIC | Age: 69
End: 2019-11-20
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
## 2019-11-21 NOTE — TELEPHONE ENCOUNTER
Based on the location of his back pain at the time of his most recent visit and the extreme amount of tenderness he demonstrated over the paraspinal muscles of his lower back, this pain was not due to his kidney stones

## 2019-11-21 NOTE — TELEPHONE ENCOUNTER
THIS WAS NOT PAIN DUE TO KIDNEY STONES  This was acute low back pain of a musculo-skeletal nature with acute sciatica

## 2019-11-27 NOTE — TELEPHONE ENCOUNTER
I have not received the authorization to speak with the insurance company  When this is received, I will relay this information

## 2019-12-18 ENCOUNTER — TELEPHONE (OUTPATIENT)
Dept: INTERNAL MEDICINE CLINIC | Age: 69
End: 2019-12-18

## 2019-12-18 NOTE — TELEPHONE ENCOUNTER
Received a request from Rosemary Kaba in request for medical records and bills on this patient  Sent to [id number] on 12/12/2019 for them to process this request    I also faxed to our [facility] billing department on 12/18/2019 for them to process the medical bills to them as well

## 2019-12-23 ENCOUNTER — OFFICE VISIT (OUTPATIENT)
Dept: INTERNAL MEDICINE CLINIC | Facility: CLINIC | Age: 69
End: 2019-12-23
Payer: COMMERCIAL

## 2019-12-23 ENCOUNTER — TELEPHONE (OUTPATIENT)
Dept: INTERNAL MEDICINE CLINIC | Facility: CLINIC | Age: 69
End: 2019-12-23

## 2019-12-23 VITALS
OXYGEN SATURATION: 98 % | DIASTOLIC BLOOD PRESSURE: 62 MMHG | TEMPERATURE: 98.3 F | SYSTOLIC BLOOD PRESSURE: 110 MMHG | HEIGHT: 68 IN | WEIGHT: 197 LBS | HEART RATE: 95 BPM | BODY MASS INDEX: 29.86 KG/M2

## 2019-12-23 DIAGNOSIS — G89.29 CHRONIC MIDLINE LOW BACK PAIN WITHOUT SCIATICA: ICD-10-CM

## 2019-12-23 DIAGNOSIS — I48.20 CHRONIC ATRIAL FIBRILLATION (HCC): ICD-10-CM

## 2019-12-23 DIAGNOSIS — N20.0 BILATERAL NEPHROLITHIASIS: ICD-10-CM

## 2019-12-23 DIAGNOSIS — E11.40 TYPE 2 DIABETES MELLITUS WITH DIABETIC NEUROPATHY, WITHOUT LONG-TERM CURRENT USE OF INSULIN (HCC): ICD-10-CM

## 2019-12-23 DIAGNOSIS — R31.0 HEMATURIA, GROSS: ICD-10-CM

## 2019-12-23 DIAGNOSIS — M54.50 CHRONIC MIDLINE LOW BACK PAIN WITHOUT SCIATICA: ICD-10-CM

## 2019-12-23 DIAGNOSIS — Z23 NEED FOR INFLUENZA VACCINATION: Primary | ICD-10-CM

## 2019-12-23 PROCEDURE — 99214 OFFICE O/P EST MOD 30 MIN: CPT | Performed by: INTERNAL MEDICINE

## 2019-12-23 RX ORDER — TRAMADOL HYDROCHLORIDE 50 MG/1
50 TABLET ORAL EVERY 6 HOURS PRN
Qty: 120 TABLET | Refills: 0 | Status: SHIPPED | OUTPATIENT
Start: 2019-12-23

## 2019-12-23 RX ORDER — ALBUTEROL SULFATE 90 UG/1
AEROSOL, METERED RESPIRATORY (INHALATION)
COMMUNITY
Start: 2019-11-26 | End: 2020-10-23 | Stop reason: ALTCHOICE

## 2019-12-23 RX ORDER — DILTIAZEM HYDROCHLORIDE 240 MG/1
1 CAPSULE, EXTENDED RELEASE ORAL DAILY
COMMUNITY
End: 2019-12-23 | Stop reason: ALTCHOICE

## 2019-12-23 NOTE — TELEPHONE ENCOUNTER
[facility] approved Tramadol #[id number] because patient needs to take more then 7 days  Patient ID # [id number]

## 2019-12-23 NOTE — PROGRESS NOTES
Assessment/Plan:    Type 2 diabetes mellitus with neurologic complication, without long-term current use of insulin ([facility])    Lab Results   Component Value Date    HGBA1C 6 6 (A) 09/06/2019   Stable on BID metformin  F/u in 4 months  Hematuria, gross  Has f/u scheduled with urology  Chronic midline low back pain without sciatica  Tramadol renewed  Chronic atrial fibrillation (HCC)  Continues on diltiazem cd with good rate control  Bilateral nephrolithiasis  F/U with urology for evaluation regarding bilateral nephrolithiasis       Diagnoses and all orders for this visit:    Need for influenza vaccination  -     influenza vaccine, [id number], high-dose, PF 0 5 mL (FLUZONE HIGH-DOSE)    Chronic midline low back pain without sciatica  -     traMADol (ULTRAM) 50 mg tablet; Take 1 tablet (50 mg total) by mouth every 6 (six) hours as needed for moderate pain  -     CBC; Future  -     Comprehensive metabolic panel; Future    Hematuria, gross  -     CBC; Future    Chronic atrial fibrillation  -     CBC; Future  -     Comprehensive metabolic panel; Future    Type 2 diabetes mellitus with diabetic neuropathy, without long-term current use of insulin ([facility])  -     Hemoglobin A1C; Future  -     Microalbumin / creatinine urine ratio; Future  -     Lipid panel; Future    Bilateral nephrolithiasis  -     traMADol (ULTRAM) 50 mg tablet; Take 1 tablet (50 mg total) by mouth every 6 (six) hours as needed for moderate pain  -     CBC; Future  -     Comprehensive metabolic panel; Future    Other orders  -     glucose blood test strip; Accu-Chek Nellie Plus test strips   TEST four times a day for 10 days  -     Discontinue: diltiazem (DILACOR XR) 240 MG 24 hr capsule; Take 1 capsule by mouth daily  -     albuterol (PROVENTIL HFA,VENTOLIN HFA) 90 mcg/act inhaler; RESCUE INHALER: 1-2INHALATIONS EVERY 4HRS AS NEEDED FOR COUGH, WHEEZING OR SHORTNESS OF BREATH          Subjective:      Patient ID: Amanda Johnson is a 78 y o  male     Patient presents for f/u visit for Type II DM, a recent history of brief episodes of hematuria with findings of bilateral nephrolithiasis on CT imaging  He also has chronic atrial fibrillation but refuses anticoagulant medication, and has chronic midline low back pain without sciatica  In general he is stable  His doctor at the [place] told him to see a urologist to have his kidney stones removed  He is not complaining of any flank pain at this time  CT scan reviewed  Bilateral nonobstructing stones  6mm stone in right renal pelvis and a 10mm stone in the left lower pole  Additional note was made of lobulated hepatic contour suggestive of cirrhosis with top normal portal vein diameter  Patient is also asking to renew his Tramadol which is used for his chronic low back pain        Family History   Problem Relation Age of Onset    Dementia Mother     Pneumonia Father         Acinetobacter    Diabetes Father     Colon cancer Sister     Lung cancer Sister     Breast cancer Sister     Lung cancer Brother      Social History     Socioeconomic History    Marital status: /Civil Union     Spouse name: Not on file    Number of children: Not on file    Years of education: Not on file    Highest education level: Not on file   Occupational History    Not on file   Social Needs    Financial resource strain: Not on file    Food insecurity:     Worry: Not on file     Inability: Not on file    Transportation needs:     Medical: Not on file     Non-medical: Not on file   Tobacco Use    Smoking status: Former Smoker     Types: Cigarettes     Last attempt to quit: 1977     Years since quittin 0    Smokeless tobacco: Never Used   Substance and Sexual Activity    Alcohol use: Not Currently    Drug use: Not Currently    Sexual activity: Not on file   Lifestyle    Physical activity:     Days per week: Not on file     Minutes per session: Not on file    Stress: Not on file   Relationships    Social connections:     Talks on phone: Not on file     Gets together: Not on file     Attends Worship service: Not on file     Active member of club or organization: Not on file     Attends meetings of clubs or organizations: Not on file     Relationship status: Not on file    Intimate partner violence:     Fear of current or ex partner: Not on file     Emotionally abused: Not on file     Physically abused: Not on file     Forced sexual activity: Not on file   Other Topics Concern    Not on file   Social History Narrative    Not on file     Past Medical History:   Diagnosis Date    Arthritis     [facility] fibrillation ([facility] )     Diabetes 1 5, 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[facility] )     Hepatitis C, acute     HTN (hypertension)     Neuropathy        Current Outpatient Medications:     albuterol (PROVENTIL HFA,VENTOLIN HFA) 90 mcg/act inhaler, RESCUE INHALER: 1-2INHALATIONS EVERY 4HRS AS NEEDED FOR COUGH, WHEEZING OR SHORTNESS OF BREATH, Disp: , Rfl:     aspirin 81 MG tablet, Take 162 mg by mouth 2 (two) times a day , Disp: , Rfl:     Cyanocobalamin (B-12 PO), Take by mouth daily, Disp: , Rfl:     diltiazem (CARDIZEM CD) 180 mg 24 hr capsule, Take 180 mg by mouth daily , Disp: , Rfl:     fluticasone (FLONASE) 50 mcg/act nasal spray, 2 sprays into each nostril daily, Disp: , Rfl: 3    glucose blood test strip, Accu-Chek Nellie Plus test strips  TEST four times a day for 10 days, Disp: , Rfl:     metFORMIN (GLUCOPHAGE) 1000 MG tablet, Take 1,000 mg by mouth 2 (two) times a day , Disp: , Rfl:     traMADol (ULTRAM) 50 mg tablet, Take 1 tablet (50 mg total) by mouth every 6 (six) hours as needed for moderate pain, Disp: 120 tablet, Rfl: 0    ULTRA FRESH 0 5 % SOLN, as needed , Disp: , Rfl:     COENZYME Q10 PO, Take 1 Can by mouth daily, Disp: , Rfl:   No Known Allergies  Past Surgical History:   Procedure Laterality Date    CYSTOSTOMY W/ BLADDER DILATION      basket extraction of calculus    FEMUR FRACTURE SURGERY      LITHOTRIPSY      PATELLA FRACTURE SURGERY      STONE ANALYSIS (HISTORICAL)           Review of Systems   Constitutional: Negative for chills, diaphoresis and fever  HENT: Negative  Eyes: Negative  Respiratory: Negative  Cardiovascular: Negative  Gastrointestinal: Negative  Genitourinary: Positive for flank pain (Not currently) and hematuria (previous episodes)  Musculoskeletal: Positive for back pain (Chronic)  Neurological: Negative  Hematological: Negative  Psychiatric/Behavioral: Negative  Objective:      /62 (BP Location: Left arm, Patient Position: Sitting, Cuff Size: Standard)   Pulse 95   Temp 98 3 °F (36 8 °C) (Oral)   Ht 5' 8 35" (1 736 m) Comment: with shoes  Wt 89 4 kg (197 lb) Comment: with shoes  SpO2 98%   BMI 29 65 kg/m²  BMI Counseling: Body mass index is 29 65 kg/m²  The BMI is above normal  Nutrition recommendations include reducing portion sizes, decreasing overall calorie intake, 3-5 servings of fruits/vegetables daily, reducing fast food intake, decreasing soda and/or juice intake, moderation in carbohydrate intake, increasing intake of lean protein and reducing intake of cholesterol  Physical Exam   Constitutional: He is oriented to person, place, and time  He appears well-developed and well-nourished  No distress  HENT:   Head: Normocephalic and atraumatic  Eyes: Pupils are equal, round, and reactive to light  Conjunctivae are normal  No scleral icterus  Neck: Neck supple  No JVD present  No tracheal deviation present  No thyromegaly present  Cardiovascular: Normal heart sounds  An irregularly irregular rhythm present  No murmur heard  Pulmonary/Chest: Effort normal and breath sounds normal  No respiratory distress  Abdominal: Soft  Bowel sounds are normal  He exhibits no distension  There is no tenderness  Musculoskeletal: He exhibits no edema or deformity  Neurological: He is alert and oriented to person, place, and time  No cranial nerve deficit  Coordination normal    Non focal   Skin: Skin is warm and dry  He is not diaphoretic  No erythema  No pallor  Psychiatric: He has a normal mood and affect  His behavior is normal    Vitals reviewed

## 2019-12-24 NOTE — ASSESSMENT & PLAN NOTE
Lab Results   Component Value Date    HGBA1C 6 6 (A) 09/06/2019   Stable on BID metformin  F/u in 4 months

## 2020-01-27 ENCOUNTER — TELEPHONE (OUTPATIENT)
Dept: UROLOGY | Facility: MEDICAL CENTER | Age: 70
End: 2020-01-27

## 2020-01-27 NOTE — TELEPHONE ENCOUNTER
Patient would like to see Dr Whitehead Brood      Complaint/diagnosis: Kidney stones    Insurance: Medicare/[facility]    History of Cancer: no    Previous Urologist: yes about 1979    Outside testing/where:no    Records requested/where:no    Preferred Location: [facility]

## 2020-01-29 NOTE — TELEPHONE ENCOUNTER
Called patient - LMOM to call the office to schedule an appointment  If patient calls back he can be scheduled with any provider for their first available new patient

## 2020-02-03 ENCOUNTER — TELEPHONE (OUTPATIENT)
Dept: UROLOGY | Facility: MEDICAL CENTER | Age: 70
End: 2020-02-03

## 2020-02-03 NOTE — TELEPHONE ENCOUNTER
Patient called to update on insurance  Is currently on [employer], which was previous plan  No cards available at this time  Will bring new cards come in  Please be advise    Thank you

## 2020-02-07 ENCOUNTER — OFFICE VISIT (OUTPATIENT)
Dept: UROLOGY | Facility: MEDICAL CENTER | Age: 70
End: 2020-02-07
Payer: COMMERCIAL

## 2020-02-07 VITALS
BODY MASS INDEX: 30.04 KG/M2 | HEART RATE: 102 BPM | SYSTOLIC BLOOD PRESSURE: 120 MMHG | HEIGHT: 68 IN | DIASTOLIC BLOOD PRESSURE: 70 MMHG | WEIGHT: 198.2 LBS

## 2020-02-07 DIAGNOSIS — N20.0 KIDNEY STONES: ICD-10-CM

## 2020-02-07 DIAGNOSIS — R35.1 NOCTURIA: ICD-10-CM

## 2020-02-07 DIAGNOSIS — R31.0 GROSS HEMATURIA: Primary | ICD-10-CM

## 2020-02-07 LAB
POST-VOID RESIDUAL VOLUME, ML POC: 18 ML
SL AMB  POCT GLUCOSE, UA: ABNORMAL
SL AMB LEUKOCYTE ESTERASE,UA: ABNORMAL
SL AMB POCT BILIRUBIN,UA: ABNORMAL
SL AMB POCT BLOOD,UA: ABNORMAL
SL AMB POCT CLARITY,UA: CLEAR
SL AMB POCT COLOR,UA: YELLOW
SL AMB POCT KETONES,UA: ABNORMAL
SL AMB POCT NITRITE,UA: ABNORMAL
SL AMB POCT PH,UA: 6
SL AMB POCT SPECIFIC GRAVITY,UA: 1.02
SL AMB POCT URINE PROTEIN: ABNORMAL
SL AMB POCT UROBILINOGEN: 0.2

## 2020-02-07 PROCEDURE — 99204 OFFICE O/P NEW MOD 45 MIN: CPT | Performed by: UROLOGY

## 2020-02-07 PROCEDURE — 88112 CYTOPATH CELL ENHANCE TECH: CPT | Performed by: PATHOLOGY

## 2020-02-07 PROCEDURE — 51798 US URINE CAPACITY MEASURE: CPT | Performed by: UROLOGY

## 2020-02-07 PROCEDURE — 81003 URINALYSIS AUTO W/O SCOPE: CPT | Performed by: UROLOGY

## 2020-02-07 RX ORDER — LANOLIN ALCOHOL/MO/W.PET/CERES
1000 CREAM (GRAM) TOPICAL DAILY
COMMUNITY
Start: 2019-11-17

## 2020-02-07 RX ORDER — DILTIAZEM HYDROCHLORIDE 240 MG/1
240 CAPSULE, EXTENDED RELEASE ORAL DAILY
COMMUNITY
Start: 2019-11-17 | End: 2022-04-19 | Stop reason: ALTCHOICE
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
## 2020-02-11 LAB
ALBUMIN SERPL-MCNC: 4.3 G/DL (ref 3.6–5.1)
ALBUMIN SERPL-MCNC: 4.4 G/DL (ref 3.6–5.1)
ALBUMIN/GLOB SERPL: 1.8 (CALC) (ref 1–2.5)
ALBUMIN/GLOB SERPL: 1.8 (CALC) (ref 1–2.5)
ALP SERPL-CCNC: 58 U/L (ref 35–144)
ALP SERPL-CCNC: 58 U/L (ref 35–144)
ALT SERPL-CCNC: 20 U/L (ref 9–46)
ALT SERPL-CCNC: 20 U/L (ref 9–46)
AST SERPL-CCNC: 10 U/L (ref 10–35)
AST SERPL-CCNC: 10 U/L (ref 10–35)
BASOPHILS # BLD AUTO: 52 CELLS/UL (ref 0–200)
BASOPHILS NFR BLD AUTO: 1 %
BILIRUB SERPL-MCNC: 0.6 MG/DL (ref 0.2–1.2)
BILIRUB SERPL-MCNC: 0.6 MG/DL (ref 0.2–1.2)
BUN SERPL-MCNC: 15 MG/DL (ref 7–25)
BUN SERPL-MCNC: 15 MG/DL (ref 7–25)
BUN/CREAT SERPL: ABNORMAL (CALC) (ref 6–22)
BUN/CREAT SERPL: ABNORMAL (CALC) (ref 6–22)
CALCIUM SERPL-MCNC: 8.9 MG/DL (ref 8.6–10.3)
CALCIUM SERPL-MCNC: 9 MG/DL (ref 8.6–10.3)
CHLORIDE SERPL-SCNC: 104 MMOL/L (ref 98–110)
CHLORIDE SERPL-SCNC: 105 MMOL/L (ref 98–110)
CHOLEST SERPL-MCNC: 207 MG/DL
CHOLEST/HDLC SERPL: 4.9 (CALC)
CO2 SERPL-SCNC: 25 MMOL/L (ref 20–32)
CO2 SERPL-SCNC: 25 MMOL/L (ref 20–32)
CREAT SERPL-MCNC: 0.7 MG/DL (ref 0.7–1.18)
CREAT SERPL-MCNC: 0.71 MG/DL (ref 0.7–1.18)
EOSINOPHIL # BLD AUTO: 78 CELLS/UL (ref 15–500)
EOSINOPHIL NFR BLD AUTO: 1.5 %
ERYTHROCYTE [DISTWIDTH] IN BLOOD BY AUTOMATED COUNT: 13.1 % (ref 11–15)
GLOBULIN SER CALC-MCNC: 2.4 G/DL (CALC) (ref 1.9–3.7)
GLOBULIN SER CALC-MCNC: 2.4 G/DL (CALC) (ref 1.9–3.7)
GLUCOSE SERPL-MCNC: 158 MG/DL (ref 65–99)
GLUCOSE SERPL-MCNC: 159 MG/DL (ref 65–99)
HBA1C MFR BLD: 8 % OF TOTAL HGB
HCT VFR BLD AUTO: 41.2 % (ref 38.5–50)
HDLC SERPL-MCNC: 42 MG/DL
HGB BLD-MCNC: 14.2 G/DL (ref 13.2–17.1)
LDLC SERPL CALC-MCNC: 145 MG/DL (CALC)
LYMPHOCYTES # BLD AUTO: 1518 CELLS/UL (ref 850–3900)
LYMPHOCYTES NFR BLD AUTO: 29.2 %
MCH RBC QN AUTO: 31.7 PG (ref 27–33)
MCHC RBC AUTO-ENTMCNC: 34.5 G/DL (ref 32–36)
MCV RBC AUTO: 92 FL (ref 80–100)
MONOCYTES # BLD AUTO: 468 CELLS/UL (ref 200–950)
MONOCYTES NFR BLD AUTO: 9 %
NEUTROPHILS # BLD AUTO: 3084 CELLS/UL (ref 1500–7800)
NEUTROPHILS NFR BLD AUTO: 59.3 %
NONHDLC SERPL-MCNC: 165 MG/DL (CALC)
PLATELET # BLD AUTO: 199 THOUSAND/UL (ref 140–400)
PMV BLD REES-ECKER: 11.9 FL (ref 7.5–12.5)
POTASSIUM SERPL-SCNC: 4 MMOL/L (ref 3.5–5.3)
POTASSIUM SERPL-SCNC: 4 MMOL/L (ref 3.5–5.3)
PROT SERPL-MCNC: 6.7 G/DL (ref 6.1–8.1)
PROT SERPL-MCNC: 6.8 G/DL (ref 6.1–8.1)
PSA SERPL-MCNC: 0.9 NG/ML
RBC # BLD AUTO: 4.48 MILLION/UL (ref 4.2–5.8)
SL AMB EGFR AFRICAN AMERICAN: 109 ML/MIN/1.73M2
SL AMB EGFR AFRICAN AMERICAN: 110 ML/MIN/1.73M2
SL AMB EGFR NON AFRICAN AMERICAN: 94 ML/MIN/1.73M2
SL AMB EGFR NON AFRICAN AMERICAN: 95 ML/MIN/1.73M2
SODIUM SERPL-SCNC: 138 MMOL/L (ref 135–146)
SODIUM SERPL-SCNC: 138 MMOL/L (ref 135–146)
TRIGL SERPL-MCNC: 94 MG/DL
WBC # BLD AUTO: 5.2 THOUSAND/UL (ref 3.8–10.8)

## 2020-03-02 ENCOUNTER — TELEPHONE (OUTPATIENT)
Dept: UROLOGY | Facility: AMBULATORY SURGERY CENTER | Age: 70
End: 2020-03-02

## 2020-03-02 NOTE — TELEPHONE ENCOUNTER
LVM for patient regarding his insurance currently Quang Dietz is inactive as of 2/28/2020  Thanks  Alexandra Noel

## 2020-03-04 NOTE — TELEPHONE ENCOUNTER
Pt called stating he now has Medicare [id number] also pt said everything is to be sent thru [address] then [address] told the pt I would forward the information to physician's office,contact pt directly with questions

## 2020-03-06 ENCOUNTER — TELEPHONE (OUTPATIENT)
Dept: UROLOGY | Facility: MEDICAL CENTER | Age: 70
End: 2020-03-06

## 2020-03-06 ENCOUNTER — OFFICE VISIT (OUTPATIENT)
Dept: INTERNAL MEDICINE CLINIC | Facility: CLINIC | Age: 70
End: 2020-03-06
Payer: COMMERCIAL

## 2020-03-06 VITALS
OXYGEN SATURATION: 96 % | DIASTOLIC BLOOD PRESSURE: 78 MMHG | WEIGHT: 201.6 LBS | BODY MASS INDEX: 30.55 KG/M2 | HEART RATE: 97 BPM | HEIGHT: 68 IN | TEMPERATURE: 97.8 F | SYSTOLIC BLOOD PRESSURE: 130 MMHG

## 2020-03-06 DIAGNOSIS — R31.29 MICROSCOPIC HEMATURIA: ICD-10-CM

## 2020-03-06 DIAGNOSIS — R00.0 TACHYCARDIA INDUCED CARDIOMYOPATHY (HCC): ICD-10-CM

## 2020-03-06 DIAGNOSIS — I10 ESSENTIAL HYPERTENSION: ICD-10-CM

## 2020-03-06 DIAGNOSIS — L84 CALLUS OF FOOT: ICD-10-CM

## 2020-03-06 DIAGNOSIS — Z00.00 MEDICARE ANNUAL WELLNESS VISIT, SUBSEQUENT: ICD-10-CM

## 2020-03-06 DIAGNOSIS — I48.20 CHRONIC ATRIAL FIBRILLATION (HCC): ICD-10-CM

## 2020-03-06 DIAGNOSIS — E11.40 TYPE 2 DIABETES MELLITUS WITH DIABETIC NEUROPATHY, WITHOUT LONG-TERM CURRENT USE OF INSULIN (HCC): Primary | ICD-10-CM

## 2020-03-06 DIAGNOSIS — E27.8 ADRENAL NODULE (HCC): ICD-10-CM

## 2020-03-06 DIAGNOSIS — I43 TACHYCARDIA INDUCED CARDIOMYOPATHY (HCC): ICD-10-CM

## 2020-03-06 PROBLEM — E27.9 ADRENAL NODULE (HCC): Status: ACTIVE | Noted: 2020-03-06

## 2020-03-06 PROCEDURE — 1170F FXNL STATUS ASSESSED: CPT | Performed by: INTERNAL MEDICINE

## 2020-03-06 PROCEDURE — 1125F AMNT PAIN NOTED PAIN PRSNT: CPT | Performed by: INTERNAL MEDICINE

## 2020-03-06 PROCEDURE — 1036F TOBACCO NON-USER: CPT | Performed by: INTERNAL MEDICINE

## 2020-03-06 PROCEDURE — 3078F DIAST BP <80 MM HG: CPT | Performed by: INTERNAL MEDICINE

## 2020-03-06 PROCEDURE — 1160F RVW MEDS BY RX/DR IN RCRD: CPT | Performed by: INTERNAL MEDICINE

## 2020-03-06 PROCEDURE — 3008F BODY MASS INDEX DOCD: CPT | Performed by: INTERNAL MEDICINE

## 2020-03-06 PROCEDURE — G0439 PPPS, SUBSEQ VISIT: HCPCS | Performed by: INTERNAL MEDICINE

## 2020-03-06 PROCEDURE — 2022F DILAT RTA XM EVC RTNOPTHY: CPT | Performed by: INTERNAL MEDICINE

## 2020-03-06 PROCEDURE — 3075F SYST BP GE 130 - 139MM HG: CPT | Performed by: INTERNAL MEDICINE

## 2020-03-06 PROCEDURE — 99214 OFFICE O/P EST MOD 30 MIN: CPT | Performed by: INTERNAL MEDICINE

## 2020-03-06 PROCEDURE — 4040F PNEUMOC VAC/ADMIN/RCVD: CPT | Performed by: INTERNAL MEDICINE

## 2020-03-06 NOTE — ASSESSMENT & PLAN NOTE
History of an adrenal nodule  No signs or symptoms suggestive of adrenal hyperfunction    Will continue to monitor with follow-up CT scan a 12 months

## 2020-03-06 NOTE — ASSESSMENT & PLAN NOTE
The patient is current with most age appropriate screenings and immunizations  He does refuse the flu vaccine  He has a history of atrial fibrillation refuses long-term anticoagulation

## 2020-03-06 NOTE — PROGRESS NOTES
Assessment and Plan:     Problem List Items Addressed This Visit     None           Preventive health issues were discussed with patient, and age appropriate screening tests were ordered as noted in patient's After Visit Summary  Personalized health advice and appropriate referrals for health education or preventive services given if needed, as noted in patient's After Visit Summary       History of Present Illness:     Patient presents for Medicare Annual Wellness visit    Patient Care Team:  Abeba Chavez MD as PCP - General (Internal Medicine)     Problem List:     Patient Active Problem List   Diagnosis    Right hip pain    Chronic pain    Coronary artery disease (CAD) excluded    Essential hypertension    Osteoarthrosis    Chronic atrial fibrillation    PTSD (post-traumatic stress disorder)    Type 2 diabetes mellitus with neurologic complication, without long-term current use of insulin (HCC)    Tachycardia induced cardiomyopathy (HCC)    Scoliosis    Chronic right sacroiliac joint pain    Hematuria, gross    Hepatitis C virus infection resolved after antiviral drug therapy    Chronic midline low back pain without sciatica    Bilateral nephrolithiasis      Past Medical and Surgical History:     Past Medical History:   Diagnosis Date    Arthritis     Atrial fibrillation (HCC)     Diabetes 1 5, 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[facility] [address] )     Hepatitis C, acute     HTN (hypertension)     Neuropathy      Past Surgical History:   Procedure Laterality Date    CYSTOSTOMY W/ BLADDER DILATION      basket extraction of calculus    FEMUR FRACTURE SURGERY      LITHOTRIPSY      PATELLA FRACTURE SURGERY      STONE ANALYSIS (HISTORICAL)        Family History:     Family History   Problem Relation Age of Onset    Dementia Mother     Pneumonia Father         Acinetobacter    Diabetes Father     Colon cancer Sister     Lung cancer Sister     Breast cancer Sister     Lung cancer Brother       Social History: Social History     Socioeconomic History    Marital status: /Civil Union     Spouse name: None    Number of children: None    Years of education: None    Highest education level: None   Occupational History    None   Social Needs    Financial resource strain: None    Food insecurity:     Worry: None     Inability: None    Transportation needs:     Medical: None     Non-medical: None   Tobacco Use    Smoking status: Former Smoker     Types: Cigarettes     Last attempt to quit: 1977     Years since quittin 2    Smokeless tobacco: Never Used   Substance and Sexual Activity    Alcohol use: Not Currently    Drug use: Not Currently    Sexual activity: None   Lifestyle    Physical activity:     Days per week: None     Minutes per session: None    Stress: None   Relationships    Social connections:     Talks on phone: None     Gets together: None     Attends Anabaptist service: None     Active member of club or organization: None     Attends meetings of clubs or organizations: None     Relationship status: None    Intimate partner violence:     Fear of current or ex partner: None     Emotionally abused: None     Physically abused: None     Forced sexual activity: None   Other Topics Concern    None   Social History Narrative    None      Medications and Allergies:     Current Outpatient Medications   Medication Sig Dispense Refill    albuterol (PROVENTIL HFA,VENTOLIN HFA) 90 mcg/act inhaler RESCUE INHALER: 1-2INHALATIONS EVERY 4HRS AS NEEDED FOR COUGH, WHEEZING OR SHORTNESS OF BREATH      aspirin 81 MG tablet Take 162 mg by mouth 2 (two) times a day       COENZYME Q10 PO Take 1 Can by mouth daily      diltiazem (TIAZAC) 240 MG 24 hr capsule Take 240 mg by mouth daily       glucose blood test strip Accu-Chek Nellie Plus test strips   TEST four times a day for 10 days      metFORMIN (GLUCOPHAGE) 1000 MG tablet Take 1,000 mg by mouth 2 (two) times a day       traMADol (ULTRAM) 50 mg tablet Take 1 tablet (50 mg total) by mouth every 6 (six) hours as needed for moderate pain 120 tablet 0    ULTRA FRESH 0 5 % SOLN as needed       vitamin B-12 (VITAMIN B-12) 1,000 mcg tablet       fluticasone (FLONASE) 50 mcg/act nasal spray 2 sprays into each nostril daily  3     No current facility-administered medications for this visit  No Known Allergies   Immunizations:     Immunization History   Administered Date(s) Administered    Pneumococcal Conjugate 13-Valent 04/15/2019    Zoster Vaccine Recombinant 09/03/2019, 12/08/2019      Health Maintenance:         Topic Date Due    CRC Screening: Cologuard  04/25/2022    Hepatitis C Screening  Discontinued         Topic Date Due    Hepatitis A Vaccine (1 of 2 - Risk 2-dose series) 01/04/1951    Hepatitis B Vaccine (1 of 3 - Risk 3-dose series) 01/04/1968      Medicare Health Risk Assessment:     /78 (BP Location: Right arm, Patient Position: Sitting, Cuff Size: Adult)   Pulse 97   Temp 97 8 °F (36 6 °C) (Oral)   Ht 5' 8" (1 727 m)   Wt 91 4 kg (201 lb 9 6 oz)   SpO2 96%   BMI 30 65 kg/m²      Colie Drought is here for his Subsequent Wellness visit  Health Risk Assessment:   Patient rates overall health as fair  Patient feels that their physical health rating is slightly worse  Eyesight was rated as same  Hearing was rated as slightly worse  Patient feels that their emotional and mental health rating is same  Pain experienced in the last 7 days has been some  Patient's pain rating has been 4/10  Patient states that he has experienced no weight loss or gain in last 6 months  Depression Screening:   PHQ-2 Score: 1      Fall Risk Screening: In the past year, patient has experienced: history of falling in past year    Number of falls: 2 or more  Injured during fall?: No    Feels unsteady when standing or walking?: No    Worried about falling?: No      Home Safety:  Patient does not have trouble with stairs inside or outside of their home  Patient has working smoke alarms and has working carbon monoxide detector  Home safety hazards include: none  Nutrition:   Current diet is Diabetic, Limited junk food and Low Carb  Medications:   Patient is currently taking over-the-counter supplements  OTC medications include: see medication list  Patient is able to manage medications  Activities of Daily Living (ADLs)/Instrumental Activities of Daily Living (IADLs):   Walk and transfer into and out of bed and chair?: Yes  Dress and groom yourself?: Yes    Bathe or shower yourself?: Yes    Feed yourself?  Yes  Do your laundry/housekeeping?: Yes  Manage your money, pay your bills and track your expenses?: Yes  Make your own meals?: Yes    Do your own shopping?: Yes    Previous Hospitalizations:   Any hospitalizations or ED visits within the last 12 months?: No      Advance Care Planning:   Living will: No    Durable POA for healthcare: No    Advanced directive: No    Advanced directive counseling given: Yes    Five wishes given: No    Patient declined ACP directive: Yes    End of Life Decisions reviewed with patient: No    Provider agrees with end of life decisions: Yes      Cognitive Screening:   Provider or family/friend/caregiver concerned regarding cognition?: No    PREVENTIVE SCREENINGS      Cardiovascular Screening:    General: Screening Current      Diabetes Screening:     General: Screening Not Indicated and History Diabetes      Colorectal Cancer Screening:     General: Screening Current      Prostate Cancer Screening:    General: Screening Current      Osteoporosis Screening:    General: Patient Declines and Screening Not Indicated      Abdominal Aortic Aneurysm (AAA) Screening:    Risk factors include: age between 73-67 yo and tobacco use        General: Screening Current      Lung Cancer Screening:     General: Screening Not Indicated      Hepatitis C Screening:    General: Screening Not Indicated and History Hepatitis C      Kwesi Isatu German MD
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
## 2020-03-06 NOTE — ASSESSMENT & PLAN NOTE
Blood pressure is well controlled at the present time    No adjustments are necessary to his medications
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
## 2020-03-06 NOTE — ASSESSMENT & PLAN NOTE
Persistent microscopic hematuria patient is being followed by a Urology  He has some examinations pending including a cystoscopy

## 2020-03-06 NOTE — TELEPHONE ENCOUNTER
Per automated system with [employer], patient has not had active coverage since 6/7/19     Reference call # [id number]

## 2020-03-06 NOTE — ASSESSMENT & PLAN NOTE
Lab Results   Component Value Date    HGBA1C 8 0 (H) 02/10/2020   Recent hemoglobin A1c 8%  Patient was given a prescription for empagloflozin to improve his glycemic control    We will recheck his hemoglobin A1c in early June and adjust accordingly

## 2020-03-06 NOTE — ASSESSMENT & PLAN NOTE
Small callus of his right foot underlying the 3rd and 4th MTP joints  Patient had been doing a lot of walking while traveling over the past few months and believes that this aggravated the condition    We will monitor and if necessary obtain a punch biopsy to remove the callus and examine pathology

## 2020-03-06 NOTE — TELEPHONE ENCOUNTER
Pt managed by Dr Melendez-  from [address] [place] [address]   called asking to speak with Pre Auth Specialist Estevan GALICIA informed them she was not available today they are following up on documentation request faxed to [phone] for Auth  Determination CT Scan which is scheduled for 03/11/20

## 2020-03-10 NOTE — TELEPHONE ENCOUNTER
Left two messages for patient on voicemail letting him know the CT scan is denied and is being canceled for tomorrow until we do the peer to peer

## 2020-03-10 NOTE — TELEPHONE ENCOUNTER
Brayan Meadows  This patients CT scan abdomen and pelvis w contrast was denied with the insurance  The peer to peer is scheduled for tomorrow 3/11/2020 at 12:15pm they will call your number directly    Thanks  Dori

## 2020-03-11 NOTE — TELEPHONE ENCOUNTER
Approved but for CT urogram (not just CT with contrast)  Code for this study is [phone]  We will get an authorization today

## 2020-03-12 DIAGNOSIS — R31.0 GROSS HEMATURIA: Primary | ICD-10-CM

## 2020-03-12 NOTE — TELEPHONE ENCOUNTER
LVM for patient to let him know the CT scan was authorized and left central scheduling phone number to call and r/s

## 2020-03-12 NOTE — TELEPHONE ENCOUNTER
Desiree Dalton  Can you please put a new order in for his CT scan abdomen and pelvis with and without contrast   Thanks  Kristy Medina

## 2020-05-04 ENCOUNTER — TELEPHONE (OUTPATIENT)
Dept: UROLOGY | Facility: MEDICAL CENTER | Age: 70
End: 2020-05-04

## 2020-06-24 ENCOUNTER — APPOINTMENT (OUTPATIENT)
Dept: LAB | Facility: CLINIC | Age: 70
End: 2020-06-24
Payer: MEDICARE

## 2020-06-24 DIAGNOSIS — E11.40 TYPE 2 DIABETES MELLITUS WITH DIABETIC NEUROPATHY, WITHOUT LONG-TERM CURRENT USE OF INSULIN (HCC): ICD-10-CM

## 2020-06-24 DIAGNOSIS — G89.29 CHRONIC MIDLINE LOW BACK PAIN WITHOUT SCIATICA: ICD-10-CM

## 2020-06-24 DIAGNOSIS — N20.0 BILATERAL NEPHROLITHIASIS: ICD-10-CM

## 2020-06-24 DIAGNOSIS — R31.29 MICROSCOPIC HEMATURIA: ICD-10-CM

## 2020-06-24 DIAGNOSIS — M54.50 CHRONIC MIDLINE LOW BACK PAIN WITHOUT SCIATICA: ICD-10-CM

## 2020-06-24 DIAGNOSIS — R31.0 GROSS HEMATURIA: ICD-10-CM

## 2020-06-24 DIAGNOSIS — R31.0 HEMATURIA, GROSS: ICD-10-CM

## 2020-06-24 LAB
ALBUMIN SERPL BCP-MCNC: 3.8 G/DL (ref 3.5–5)
ALP SERPL-CCNC: 74 U/L (ref 46–116)
ALT SERPL W P-5'-P-CCNC: 25 U/L (ref 12–78)
ANION GAP SERPL CALCULATED.3IONS-SCNC: 5 MMOL/L (ref 4–13)
AST SERPL W P-5'-P-CCNC: 7 U/L (ref 5–45)
BACTERIA UR QL AUTO: ABNORMAL /HPF
BASOPHILS # BLD AUTO: 0.07 THOUSANDS/ΜL (ref 0–0.1)
BASOPHILS NFR BLD AUTO: 1 % (ref 0–1)
BILIRUB SERPL-MCNC: 0.44 MG/DL (ref 0.2–1)
BILIRUB UR QL STRIP: NEGATIVE
BUN SERPL-MCNC: 13 MG/DL (ref 5–25)
CALCIUM SERPL-MCNC: 9.1 MG/DL (ref 8.3–10.1)
CHLORIDE SERPL-SCNC: 108 MMOL/L (ref 100–108)
CHOLEST SERPL-MCNC: 201 MG/DL (ref 50–200)
CLARITY UR: CLEAR
CO2 SERPL-SCNC: 27 MMOL/L (ref 21–32)
COLOR UR: YELLOW
CREAT SERPL-MCNC: 0.82 MG/DL (ref 0.6–1.3)
CREAT UR-MCNC: 111 MG/DL
EOSINOPHIL # BLD AUTO: 0.13 THOUSAND/ΜL (ref 0–0.61)
EOSINOPHIL NFR BLD AUTO: 3 % (ref 0–6)
ERYTHROCYTE [DISTWIDTH] IN BLOOD BY AUTOMATED COUNT: 13.2 % (ref 11.6–15.1)
EST. AVERAGE GLUCOSE BLD GHB EST-MCNC: 140 MG/DL
GFR SERPL CREATININE-BSD FRML MDRD: 89 ML/MIN/1.73SQ M
GLUCOSE P FAST SERPL-MCNC: 135 MG/DL (ref 65–99)
GLUCOSE UR STRIP-MCNC: ABNORMAL MG/DL
HBA1C MFR BLD: 6.5 %
HCT VFR BLD AUTO: 45.3 % (ref 36.5–49.3)
HDLC SERPL-MCNC: 46 MG/DL
HGB BLD-MCNC: 15 G/DL (ref 12–17)
HGB UR QL STRIP.AUTO: ABNORMAL
HYALINE CASTS #/AREA URNS LPF: ABNORMAL /LPF
IMM GRANULOCYTES # BLD AUTO: 0.01 THOUSAND/UL (ref 0–0.2)
IMM GRANULOCYTES NFR BLD AUTO: 0 % (ref 0–2)
KETONES UR STRIP-MCNC: NEGATIVE MG/DL
LDLC SERPL CALC-MCNC: 134 MG/DL (ref 0–100)
LEUKOCYTE ESTERASE UR QL STRIP: ABNORMAL
LYMPHOCYTES # BLD AUTO: 1.57 THOUSANDS/ΜL (ref 0.6–4.47)
LYMPHOCYTES NFR BLD AUTO: 30 % (ref 14–44)
MCH RBC QN AUTO: 31.8 PG (ref 26.8–34.3)
MCHC RBC AUTO-ENTMCNC: 33.1 G/DL (ref 31.4–37.4)
MCV RBC AUTO: 96 FL (ref 82–98)
MICROALBUMIN UR-MCNC: 57.3 MG/L (ref 0–20)
MICROALBUMIN/CREAT 24H UR: 52 MG/G CREATININE (ref 0–30)
MONOCYTES # BLD AUTO: 0.47 THOUSAND/ΜL (ref 0.17–1.22)
MONOCYTES NFR BLD AUTO: 9 % (ref 4–12)
NEUTROPHILS # BLD AUTO: 3.05 THOUSANDS/ΜL (ref 1.85–7.62)
NEUTS SEG NFR BLD AUTO: 57 % (ref 43–75)
NITRITE UR QL STRIP: NEGATIVE
NON-SQ EPI CELLS URNS QL MICRO: ABNORMAL /HPF
NONHDLC SERPL-MCNC: 155 MG/DL
NRBC BLD AUTO-RTO: 0 /100 WBCS
PH UR STRIP.AUTO: 6 [PH]
PLATELET # BLD AUTO: 174 THOUSANDS/UL (ref 149–390)
PMV BLD AUTO: 11.6 FL (ref 8.9–12.7)
POTASSIUM SERPL-SCNC: 4.1 MMOL/L (ref 3.5–5.3)
PROT SERPL-MCNC: 7.2 G/DL (ref 6.4–8.2)
PROT UR STRIP-MCNC: ABNORMAL MG/DL
PSA SERPL-MCNC: 0.9 NG/ML (ref 0–4)
PSA SERPL-MCNC: 1 NG/ML (ref 0–4)
RBC # BLD AUTO: 4.71 MILLION/UL (ref 3.88–5.62)
RBC #/AREA URNS AUTO: ABNORMAL /HPF
SODIUM SERPL-SCNC: 140 MMOL/L (ref 136–145)
SP GR UR STRIP.AUTO: 1.04 (ref 1–1.03)
TRIGL SERPL-MCNC: 104 MG/DL
URATE SERPL-MCNC: 4.4 MG/DL (ref 4.2–8)
UROBILINOGEN UR QL STRIP.AUTO: 0.2 E.U./DL
WBC # BLD AUTO: 5.3 THOUSAND/UL (ref 4.31–10.16)
WBC #/AREA URNS AUTO: ABNORMAL /HPF

## 2020-06-24 PROCEDURE — 84550 ASSAY OF BLOOD/URIC ACID: CPT

## 2020-06-24 PROCEDURE — 80061 LIPID PANEL: CPT

## 2020-06-24 PROCEDURE — 3060F POS MICROALBUMINURIA REV: CPT | Performed by: INTERNAL MEDICINE

## 2020-06-24 PROCEDURE — 3044F HG A1C LEVEL LT 7.0%: CPT | Performed by: INTERNAL MEDICINE

## 2020-06-24 PROCEDURE — 82043 UR ALBUMIN QUANTITATIVE: CPT

## 2020-06-24 PROCEDURE — 85025 COMPLETE CBC W/AUTO DIFF WBC: CPT

## 2020-06-24 PROCEDURE — 81001 URINALYSIS AUTO W/SCOPE: CPT | Performed by: INTERNAL MEDICINE

## 2020-06-24 PROCEDURE — G0103 PSA SCREENING: HCPCS

## 2020-06-24 PROCEDURE — 82570 ASSAY OF URINE CREATININE: CPT

## 2020-06-24 PROCEDURE — 80053 COMPREHEN METABOLIC PANEL: CPT

## 2020-06-24 PROCEDURE — 36415 COLL VENOUS BLD VENIPUNCTURE: CPT

## 2020-06-24 PROCEDURE — 83036 HEMOGLOBIN GLYCOSYLATED A1C: CPT

## 2020-06-29 ENCOUNTER — OFFICE VISIT (OUTPATIENT)
Dept: INTERNAL MEDICINE CLINIC | Facility: CLINIC | Age: 70
End: 2020-06-29
Payer: MEDICARE

## 2020-06-29 VITALS
BODY MASS INDEX: 30.4 KG/M2 | HEIGHT: 68 IN | SYSTOLIC BLOOD PRESSURE: 98 MMHG | TEMPERATURE: 97.2 F | DIASTOLIC BLOOD PRESSURE: 70 MMHG | HEART RATE: 89 BPM | OXYGEN SATURATION: 97 % | WEIGHT: 200.6 LBS

## 2020-06-29 DIAGNOSIS — Z11.59 NEED FOR HEPATITIS C SCREENING TEST: Primary | ICD-10-CM

## 2020-06-29 DIAGNOSIS — I48.20 CHRONIC ATRIAL FIBRILLATION (HCC): ICD-10-CM

## 2020-06-29 DIAGNOSIS — G89.29 CHRONIC MIDLINE LOW BACK PAIN WITHOUT SCIATICA: ICD-10-CM

## 2020-06-29 DIAGNOSIS — E11.40 TYPE 2 DIABETES MELLITUS WITH DIABETIC NEUROPATHY, WITHOUT LONG-TERM CURRENT USE OF INSULIN (HCC): ICD-10-CM

## 2020-06-29 DIAGNOSIS — N20.0 BILATERAL NEPHROLITHIASIS: ICD-10-CM

## 2020-06-29 DIAGNOSIS — M54.50 CHRONIC MIDLINE LOW BACK PAIN WITHOUT SCIATICA: ICD-10-CM

## 2020-06-29 PROCEDURE — 1160F RVW MEDS BY RX/DR IN RCRD: CPT | Performed by: INTERNAL MEDICINE

## 2020-06-29 PROCEDURE — 3074F SYST BP LT 130 MM HG: CPT | Performed by: INTERNAL MEDICINE

## 2020-06-29 PROCEDURE — 3044F HG A1C LEVEL LT 7.0%: CPT | Performed by: INTERNAL MEDICINE

## 2020-06-29 PROCEDURE — 3008F BODY MASS INDEX DOCD: CPT | Performed by: INTERNAL MEDICINE

## 2020-06-29 PROCEDURE — 4040F PNEUMOC VAC/ADMIN/RCVD: CPT | Performed by: INTERNAL MEDICINE

## 2020-06-29 PROCEDURE — 99214 OFFICE O/P EST MOD 30 MIN: CPT | Performed by: INTERNAL MEDICINE

## 2020-06-29 PROCEDURE — 3078F DIAST BP <80 MM HG: CPT | Performed by: INTERNAL MEDICINE

## 2020-06-29 PROCEDURE — 3060F POS MICROALBUMINURIA REV: CPT | Performed by: INTERNAL MEDICINE

## 2020-06-29 PROCEDURE — 2022F DILAT RTA XM EVC RTNOPTHY: CPT | Performed by: INTERNAL MEDICINE

## 2020-06-29 PROCEDURE — 1036F TOBACCO NON-USER: CPT | Performed by: INTERNAL MEDICINE

## 2020-06-29 RX ORDER — MELATONIN
2000 DAILY
COMMUNITY
End: 2022-04-19 | Stop reason: SDUPTHER

## 2020-06-29 RX ORDER — B-COMPLEX WITH VITAMIN C
1 TABLET ORAL DAILY
COMMUNITY
End: 2020-10-23 | Stop reason: ALTCHOICE

## 2020-07-16 ENCOUNTER — TELEPHONE (OUTPATIENT)
Dept: UROLOGY | Facility: MEDICAL CENTER | Age: 70
End: 2020-07-16

## 2020-07-16 NOTE — TELEPHONE ENCOUNTER
Spoke to patient he would like a copy of his ov note 2/7/2020 emailed to him and would like to reschedule his appt  To another day  Emailed 2/7/2020 ov note and will call patient back with a new appt

## 2020-07-16 NOTE — TELEPHONE ENCOUNTER
Patient of Dr Hemanth Diez seen in the [facility] office  Patient  called to  requested the notes from 2/7  Please call to advise if he may pick this up  He is requesting these for a claim  He can be reached at [phone] is his wife's number if he is not answering  Thank you

## 2020-07-24 ENCOUNTER — TELEPHONE (OUTPATIENT)
Dept: UROLOGY | Facility: MEDICAL CENTER | Age: 70
End: 2020-07-24

## 2020-07-24 NOTE — TELEPHONE ENCOUNTER
Called patient - he states that he has kidney stones and wishes to be seen by Dr Be Salgado  He was told that we would look into getting him an appointment  We will reach out to him on 07/27/20

## 2020-07-28 ENCOUNTER — TELEPHONE (OUTPATIENT)
Dept: UROLOGY | Facility: MEDICAL CENTER | Age: 70
End: 2020-07-28

## 2020-07-28 NOTE — TELEPHONE ENCOUNTER
Left msg on voicemail for pt to call back re scheduling appt with Dr Darrick Lopez for kidney stones  Order for CT renal protocol on chart by [facility]  Pt last seen by Dr Cherry Méndez on 2/7/20 for gross hematuria and kidney stones

## 2020-08-03 NOTE — TELEPHONE ENCOUNTER
Called and left message that Kerrie Washington has an appointment open for this Thursday if Pt would like to  Come in sooner

## 2020-08-03 NOTE — TELEPHONE ENCOUNTER
Patient returned call  Available slot not open until October to schedule with Dr Eddie Vogt  Patient asked to be seen sooner   Patient can be reached at [phone]

## 2020-09-08 ENCOUNTER — OFFICE VISIT (OUTPATIENT)
Dept: UROLOGY | Age: 70
End: 2020-09-08
Payer: MEDICARE

## 2020-09-08 VITALS
SYSTOLIC BLOOD PRESSURE: 122 MMHG | DIASTOLIC BLOOD PRESSURE: 82 MMHG | WEIGHT: 197 LBS | HEIGHT: 68 IN | TEMPERATURE: 97.5 F | BODY MASS INDEX: 29.86 KG/M2

## 2020-09-08 DIAGNOSIS — N20.0 BILATERAL NEPHROLITHIASIS: ICD-10-CM

## 2020-09-08 DIAGNOSIS — R35.1 BENIGN PROSTATIC HYPERPLASIA WITH NOCTURIA: ICD-10-CM

## 2020-09-08 DIAGNOSIS — N40.1 BENIGN PROSTATIC HYPERPLASIA WITH NOCTURIA: ICD-10-CM

## 2020-09-08 DIAGNOSIS — R31.29 MICROSCOPIC HEMATURIA: Primary | ICD-10-CM

## 2020-09-08 PROCEDURE — 99214 OFFICE O/P EST MOD 30 MIN: CPT | Performed by: UROLOGY

## 2020-09-08 NOTE — PATIENT INSTRUCTIONS
Kidney Stones   WHAT YOU NEED TO KNOW:   What is a kidney stone? Kidney stones form in the urinary system when the water and waste in your urine are out of balance  When this happens, certain types of waste crystals separate from the urine  The crystals build up and form kidney stones  Kidney stones can be made of uric acid, calcium, phosphate, or oxalate crystals  You may have 1 or more kidney stones  What increases my risk for kidney stones? · You do not drink enough liquids (especially water) each day  · You have urinary tract infections often  · You follow a certain type of diet  For example, people who eat a diet high in meat or salt may be at higher risk for kidney stones  People who eat foods high in oxalate may also be at higher risk  Foods that are high in oxalate include nuts, chocolate, coffee, and green leafy vegetables  · You take certain medicines such as diuretics, steroids, and antacids  · A family member has had kidney stones  · You were born with a kidney or bowel disorder, or you have other medical problems such as gout  What are the signs and symptoms of kidney stones? · Pain in the middle of your back that moves across to your side or that may spread to your groin    · Nausea and vomiting    · Urge to urinate often, burning feeling when you urinate, or pink or red urine    · Tenderness in your lower back, side, or stomach  How are kidney stones diagnosed? Your healthcare provider will ask about your health, diet, and lifestyle  He may refer you to a urologist  Jose Gallegos may need tests to find out what type of kidney stones you have  Tests can show the size of your kidney stones and where they are in your urinary system  You may have one or more of the following:  · Urine tests  may show if you have blood in your urine  They may also show high amounts of the substances that form kidney stones, such as uric acid  · Blood tests  show how well your kidneys are working   They may also be used to check the levels of calcium or uric acid in your blood  · A noncontrast helical CT scan  is a type of x-ray that uses a computer to take pictures of your kidneys  Healthcare providers use the pictures to check for kidney stones or other problems  · X-rays  of your kidneys, bladder, and ureters may be done  You may be given a dye before the pictures are taken to help healthcare providers see the pictures better  You may need to have more than one x-ray  Tell the healthcare provider if you have ever had an allergic reaction to contrast dye  · An abdominal ultrasound  uses sound waves to show pictures of your abdomen on a monitor  How are kidney stones treated? · NSAIDs , such as ibuprofen, help decrease swelling, pain, and fever  This medicine is available with or without a doctor's order  NSAIDs can cause stomach bleeding or kidney problems in certain people  If you take blood thinner medicine, always ask your healthcare provider if NSAIDs are safe for you  Always read the medicine label and follow directions  · Prescription medicine  may be given  Ask how to take this medicine safely  · Medicines  to balance your electrolytes may be needed  · A procedure or surgery  to remove the kidney stones may be needed if they do not pass on their own  Your treatment will depend on the size and location of your kidney stones  How can I manage my symptoms? · Drink plenty of liquids  Your healthcare provider may tell you to drink at least 8 to 12 (eight-ounce) cups of liquids each day  This helps flush out the kidney stones when you urinate  Water is the best liquid to drink  · Strain your urine every time you go to the bathroom  Urinate through a strainer or a piece of thin cloth to catch the stones  Take the stones to your healthcare provider so they can be sent to the lab for tests  This will help your healthcare providers plan the best treatment for you             · Eat a variety of healthy foods  Healthy foods include fruits, vegetables, whole-grain breads, low-fat dairy products, beans, and fish  You may need to limit how much sodium (salt) or protein you eat  Ask for information about the best foods for you  · Exercise regularly  Your stones may pass more easily if you stay active  Ask about the best activities for you  When should I seek immediate care? · You have vomiting that is not relieved by medicine  When should I contact my healthcare provider? · You have a fever  · You have trouble passing urine  · You see blood in your urine  · You have severe pain  · You have any questions or concerns about your condition or care  CARE AGREEMENT:   You have the right to help plan your care  Learn about your health condition and how it may be treated  Discuss treatment options with your caregivers to decide what care you want to receive  You always have the right to refuse treatment  The above information is an  only  It is not intended as medical advice for individual conditions or treatments  Talk to your doctor, nurse or pharmacist before following any medical regimen to see if it is safe and effective for you  © 2017 [employer] Information is for End User's use only and may not be sold, redistributed or otherwise used for commercial purposes  All illustrations and images included in CareNotes® are the copyrighted property of A D A M [employer]  or Franco Rascon

## 2020-09-08 NOTE — PROGRESS NOTES
Assessment/Plan:    Bilateral nephrolithiasis  Bilateral kidney stones are noted on CT scan 1 year ago  He is having intermittent flank pain on both sides  There is no fever and he is not having any acute pain at this time  I recommended obtaining a repeat CT scan at this time  We will then be able to decide on a more informed  plan to treat his stones  Benign prostatic hyperplasia with nocturia  The patient is satisfied with his voiding pattern at this time  PSA on June 24th was 0 9  He declined digital rectal examination  Diagnoses and all orders for this visit:    Microscopic hematuria    Bilateral nephrolithiasis    Benign prostatic hyperplasia with nocturia    Other orders  -     Empagliflozin 10 MG TABS; Take 10 mg by mouth every morning          Subjective:      Patient ID: Sanam Zapata is a 69 y o  male  Benign Prostatic Hypertrophy   This is a chronic problem  The current episode started more than 1 year ago  The problem is unchanged  Irritative symptoms include nocturia (Nocturia x 4) and urgency  Irritative symptoms do not include frequency  Obstructive symptoms do not include dribbling, incomplete emptying, an intermittent stream, a slower stream, straining or a weak stream  Pertinent negatives include no chills, dysuria, hematuria, hesitancy, nausea or vomiting  His sexual activity is non-contributory to the current illness  The symptoms are aggravated by caffeine  Past treatments include nothing  Kidney stones   He notes intermittent bilateral flank pain  NO fever  The following portions of the patient's history were reviewed and updated as appropriate: allergies, current medications, past family history, past medical history, past social history, past surgical history and problem list     Review of Systems   Constitutional: Negative for chills, diaphoresis, fatigue and fever  HENT: Negative  Eyes: Negative  Respiratory: Negative  Cardiovascular: Negative  Gastrointestinal: Negative for nausea and vomiting  Endocrine: Negative  Genitourinary: Positive for nocturia (Nocturia x 4) and urgency  Negative for dysuria, frequency, hematuria, hesitancy and incomplete emptying  See HPI   Musculoskeletal: Negative  Skin: Negative  Allergic/Immunologic: Negative  Neurological: Negative  Hematological: Negative  Psychiatric/Behavioral: Negative  Objective:      /82 (BP Location: Left arm, Patient Position: Sitting, Cuff Size: Adult)   Temp 97 5 °F (36 4 °C)   Ht 5' 8 31" (1 735 m)   Wt 89 4 kg (197 lb)   BMI 29 68 kg/m²          Physical Exam  Vitals signs reviewed  Constitutional:       Appearance: He is well-developed  HENT:      Head: Normocephalic and atraumatic  Eyes:      Conjunctiva/sclera: Conjunctivae normal    Neck:      Musculoskeletal: Neck supple  Cardiovascular:      Rate and Rhythm: Normal rate  Pulmonary:      Effort: Pulmonary effort is normal    Abdominal:      General: There is no distension  Palpations: Abdomen is soft  There is no mass  Tenderness: There is no abdominal tenderness  There is no right CVA tenderness, left CVA tenderness, guarding or rebound  Hernia: No hernia is present  Skin:     General: Skin is warm and dry  Neurological:      Mental Status: He is alert and oriented to person, place, and time  Psychiatric:         Behavior: Behavior normal          Thought Content:  Thought content normal          Judgment: Judgment normal

## 2020-09-08 NOTE — ASSESSMENT & PLAN NOTE
Bilateral kidney stones are noted on CT scan 1 year ago  He is having intermittent flank pain on both sides  There is no fever and he is not having any acute pain at this time  I recommended obtaining a repeat CT scan at this time  We will then be able to decide on a more informed  plan to treat his stones

## 2020-09-08 NOTE — ASSESSMENT & PLAN NOTE
The patient is satisfied with his voiding pattern at this time  PSA on June 24th was 0 9  He declined digital rectal examination

## 2020-09-09 ENCOUNTER — HOSPITAL ENCOUNTER (OUTPATIENT)
Dept: RADIOLOGY | Facility: HOSPITAL | Age: 70
Discharge: HOME | End: 2020-09-09
Payer: MEDICARE

## 2020-09-09 DIAGNOSIS — R31.0 GROSS HEMATURIA: ICD-10-CM

## 2020-09-09 PROCEDURE — 74178 CT ABD&PLV WO CNTR FLWD CNTR: CPT

## 2020-09-09 RX ADMIN — IOHEXOL 70 ML: 350 INJECTION, SOLUTION INTRAVENOUS at 11:42

## 2020-09-10 ENCOUNTER — TELEPHONE (OUTPATIENT)
Dept: RADIOLOGY | Facility: HOSPITAL | Age: 70
End: 2020-09-10

## 2020-09-10 NOTE — NURSING NOTE
Called patient to follow up with extravasation of contrast that was given for CT renal scan on  9/9/2020  Per patient he states "swelling has gone considerable down, but still have some there  Hope it goes down by the end of today " Reinforced for patient to continue the elevation of the extremity, applying of ice 3 times a day for  20-60 minutes as tolerated  Upon asking if there was any blistering, redness, change of skin color, change in sensation, changes in temperature of skin or pain, patient denied  He only complaint " I'm a  little sore and swollen "  Informed patient to call if any changes are noted  Patient verbalized understanding

## 2020-10-23 ENCOUNTER — OFFICE VISIT (OUTPATIENT)
Dept: INTERNAL MEDICINE CLINIC | Facility: CLINIC | Age: 70
End: 2020-10-23
Payer: MEDICARE

## 2020-10-23 VITALS
DIASTOLIC BLOOD PRESSURE: 66 MMHG | OXYGEN SATURATION: 98 % | TEMPERATURE: 98 F | SYSTOLIC BLOOD PRESSURE: 108 MMHG | WEIGHT: 201 LBS | HEIGHT: 68 IN | HEART RATE: 79 BPM | BODY MASS INDEX: 30.46 KG/M2

## 2020-10-23 DIAGNOSIS — N20.0 BILATERAL NEPHROLITHIASIS: ICD-10-CM

## 2020-10-23 DIAGNOSIS — E11.40 TYPE 2 DIABETES MELLITUS WITH DIABETIC NEUROPATHY, WITHOUT LONG-TERM CURRENT USE OF INSULIN (HCC): ICD-10-CM

## 2020-10-23 DIAGNOSIS — N40.1 BENIGN PROSTATIC HYPERPLASIA WITH NOCTURIA: ICD-10-CM

## 2020-10-23 DIAGNOSIS — I10 ESSENTIAL HYPERTENSION: Primary | ICD-10-CM

## 2020-10-23 DIAGNOSIS — R35.1 BENIGN PROSTATIC HYPERPLASIA WITH NOCTURIA: ICD-10-CM

## 2020-10-23 PROCEDURE — 99214 OFFICE O/P EST MOD 30 MIN: CPT | Performed by: INTERNAL MEDICINE

## 2020-10-23 RX ORDER — HYDROCHLOROTHIAZIDE 25 MG/1
25 TABLET ORAL DAILY
Qty: 30 TABLET | Refills: 5 | Status: SHIPPED | OUTPATIENT
Start: 2020-10-23 | End: 2020-10-23 | Stop reason: SDUPTHER

## 2020-10-23 RX ORDER — MULTIVITAMIN
1 TABLET ORAL DAILY
COMMUNITY
End: 2022-04-19 | Stop reason: ALTCHOICE

## 2020-10-23 RX ORDER — HYDROCHLOROTHIAZIDE 25 MG/1
25 TABLET ORAL DAILY
Qty: 30 TABLET | Refills: 5 | Status: SHIPPED | OUTPATIENT
Start: 2020-10-23 | End: 2021-04-16

## 2020-10-27 ENCOUNTER — OFFICE VISIT (OUTPATIENT)
Dept: UROLOGY | Age: 70
End: 2020-10-27
Payer: MEDICARE

## 2020-10-27 VITALS
HEIGHT: 68 IN | BODY MASS INDEX: 30.31 KG/M2 | SYSTOLIC BLOOD PRESSURE: 130 MMHG | WEIGHT: 200 LBS | DIASTOLIC BLOOD PRESSURE: 80 MMHG | TEMPERATURE: 97.8 F

## 2020-10-27 DIAGNOSIS — N20.0 BILATERAL NEPHROLITHIASIS: Primary | ICD-10-CM

## 2020-10-27 PROCEDURE — 99214 OFFICE O/P EST MOD 30 MIN: CPT | Performed by: UROLOGY

## 2021-04-09 ENCOUNTER — TELEPHONE (OUTPATIENT)
Dept: INTERNAL MEDICINE CLINIC | Facility: CLINIC | Age: 71
End: 2021-04-09

## 2021-04-13 NOTE — TELEPHONE ENCOUNTER
Patient rescheduled to 4/16/21 @ 11:15am with Dr Daniela Stewart  Updated appointment to AWV as patient is due for an AWV

## 2021-04-16 ENCOUNTER — OFFICE VISIT (OUTPATIENT)
Dept: INTERNAL MEDICINE CLINIC | Facility: CLINIC | Age: 71
End: 2021-04-16
Payer: MEDICARE

## 2021-04-16 VITALS
OXYGEN SATURATION: 97 % | HEIGHT: 68 IN | WEIGHT: 201 LBS | DIASTOLIC BLOOD PRESSURE: 58 MMHG | SYSTOLIC BLOOD PRESSURE: 120 MMHG | BODY MASS INDEX: 30.46 KG/M2 | HEART RATE: 109 BPM | TEMPERATURE: 98 F

## 2021-04-16 DIAGNOSIS — E27.8 ADRENAL NODULE (HCC): ICD-10-CM

## 2021-04-16 DIAGNOSIS — E11.40 TYPE 2 DIABETES MELLITUS WITH DIABETIC NEUROPATHY, WITHOUT LONG-TERM CURRENT USE OF INSULIN (HCC): Primary | ICD-10-CM

## 2021-04-16 DIAGNOSIS — I10 ESSENTIAL HYPERTENSION: ICD-10-CM

## 2021-04-16 DIAGNOSIS — N20.0 BILATERAL NEPHROLITHIASIS: ICD-10-CM

## 2021-04-16 DIAGNOSIS — I43 TACHYCARDIA INDUCED CARDIOMYOPATHY (HCC): ICD-10-CM

## 2021-04-16 DIAGNOSIS — M15.3 POST-TRAUMATIC OSTEOARTHRITIS OF MULTIPLE JOINTS: ICD-10-CM

## 2021-04-16 DIAGNOSIS — R00.0 TACHYCARDIA INDUCED CARDIOMYOPATHY (HCC): ICD-10-CM

## 2021-04-16 DIAGNOSIS — I48.20 CHRONIC ATRIAL FIBRILLATION (HCC): ICD-10-CM

## 2021-04-16 LAB — SL AMB POCT HEMOGLOBIN AIC: 6.7 (ref ?–6.5)

## 2021-04-16 PROCEDURE — 1123F ACP DISCUSS/DSCN MKR DOCD: CPT | Performed by: INTERNAL MEDICINE

## 2021-04-16 PROCEDURE — 83036 HEMOGLOBIN GLYCOSYLATED A1C: CPT | Performed by: INTERNAL MEDICINE

## 2021-04-16 PROCEDURE — 99214 OFFICE O/P EST MOD 30 MIN: CPT | Performed by: INTERNAL MEDICINE

## 2021-04-16 RX ORDER — EMPAGLIFLOZIN 25 MG/1
TABLET, FILM COATED ORAL
COMMUNITY
Start: 2020-11-12 | End: 2022-04-19

## 2021-04-16 NOTE — PATIENT INSTRUCTIONS

## 2021-04-16 NOTE — ASSESSMENT & PLAN NOTE
Lab Results   Component Value Date    HGBA1C 6 5 (H) 06/24/2020    POC hemoglobin A1c is 6 7%    We will continue with current therapy with  Jardiance and metformin

## 2021-04-16 NOTE — PROGRESS NOTES
Assessment/Plan:    Tachycardia induced cardiomyopathy ([facility] [address] )    Denies any chest pain or dyspnea  Does not wish to take any additional medication that could potentially result in hypotension    Type 2 diabetes mellitus with neurologic complication, without long-term current use of insulin (HCC)    Lab Results   Component Value Date    HGBA1C 6 5 (H) 06/24/2020    POC hemoglobin A1c is 6 7%  We will continue with current therapy with  Jardiance and metformin    Osteoarthrosis   Chronic and stable  Patient wears orthotic shoes to  Compensate leg length differential    Essential hypertension   Controlled on current medication  Chronic atrial fibrillation (HCC)    Rate control with diltiazem 240 mg daily consider addition of beta-blocker    Bilateral nephrolithiasis   Was scheduled for laser lithotripsy however Urology canceled the appointment and has yet to reschedule  Pain is minimal at this point  Adrenal nodule (HCC)    This appears benign and unchanged on CT imaging       Diagnoses and all orders for this visit:    Type 2 diabetes mellitus with diabetic neuropathy, without long-term current use of insulin ([facility] [address] )  -     Ambulatory referral to Ophthalmology; Future  -     CBC; Future  -     Comprehensive metabolic panel; Future  -     Hemoglobin A1C; Future  -     Lipid panel; Future  -     Microalbumin / creatinine urine ratio; Future    Post-traumatic osteoarthritis of multiple joints  -     CBC; Future  -     Comprehensive metabolic panel; Future    Chronic atrial fibrillation (HCC)  -     CBC; Future  -     Comprehensive metabolic panel; Future    Bilateral nephrolithiasis  -     CBC; Future  -     Comprehensive metabolic panel;  Future    Essential hypertension    Tachycardia induced cardiomyopathy (HCC)    Adrenal nodule (HCC)    Other orders  -     Empagliflozin (Jardiance) 25 MG TABS; TAKE ONE-HALF TABLET BY MOUTH DAILY **NOTE NEW STRENGTH AND DOSE**          Subjective:      Patient ID: Osman Lou is a 66 y o  male  Patient presents to the office for follow-up visit for type 2 diabetes, hypertension, history of tachycardia induced cardiomyopathy, chronic atrial fibrillation with refusal of anticoagulation therapy history of nephrolithiasis an incidentally found adrenal nodule  Patient reports no problems and is feeling well  His kidney stones have not been giving him any significant pain issues  He denies hematuria  He was scheduled for laser lithotripsy but this was canceled by Urology and has not been rescheduled despite multiple requests by the patient to reschedule the procedure  He denies any palpitations, dyspnea, orthopnea, leg swelling, chest pain or diaphoresis  He denies any flank pain  No fevers or chills  He denies any bowel changes  He has had intermittent sciatic type pain radiating down his right buttock but this has not been a problem of late  He has been doing some exercises recommended by his chiropractor which has helped significantly  He did not have any labs drawn prior to this visit  Point of care hemoglobin A1c was 6 7%  He is currently taking Jardiance and metformin  He has refused COVID vaccinations        Family History   Problem Relation Age of Onset    Dementia Mother     Pneumonia Father         Acinetobacter    Diabetes Father     Colon cancer Sister     Lung cancer Sister     Breast cancer Sister     Lung cancer Brother      Social History     Socioeconomic History    Marital status: /Civil Union     Spouse name: Not on file    Number of children: Not on file    Years of education: Not on file    Highest education level: Not on file   Occupational History    Not on file   Social Needs    Financial resource strain: Not on file    Food insecurity     Worry: Not on file     Inability: Not on file   [employer] needs     Medical: Not on file     Non-medical: Not on file   Tobacco Use    Smoking status: Former Smoker     Types: Cigarettes Quit date: 1977     Years since quittin 3    Smokeless tobacco: Never Used   Substance and Sexual Activity    Alcohol use: Not Currently     Comment: 1-2 per year    Drug use: Not Currently    Sexual activity: Not on file   Lifestyle    Physical activity     Days per week: Not on file     Minutes per session: Not on file    Stress: Not on file   Relationships    Social connections     Talks on phone: Not on file     Gets together: Not on file     Attends Orthodox service: Not on file     Active member of club or organization: Not on file     Attends meetings of clubs or organizations: Not on file     Relationship status: Not on file    Intimate partner violence     Fear of current or ex partner: Not on file     Emotionally abused: Not on file     Physically abused: Not on file     Forced sexual activity: Not on file   Other Topics Concern    Not on file   Social History Narrative    Not on file     Past Medical History:   Diagnosis Date    Arthritis     Atrial fibrillation ([facility] )     Diabetes 1 5, 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[facility] )     Hepatitis C, acute     HTN (hypertension)     Neuropathy        Current Outpatient Medications:     aspirin 81 MG tablet, Take 162 mg by mouth 2 (two) times a day , Disp: , Rfl:     cholecalciferol (VITAMIN D3) 1,000 units tablet, Take 1,000 Units by mouth daily, Disp: , Rfl:     COENZYME Q10 PO, Take 1 capsule by mouth daily , Disp: , Rfl:     diltiazem (TIAZAC) 240 MG 24 hr capsule, Take 240 mg by mouth daily , Disp: , Rfl:     glucose blood test strip, 1 each by Other route daily , Disp: , Rfl:     metFORMIN (GLUCOPHAGE) 1000 MG tablet, Take 1,000 mg by mouth 2 (two) times a day , Disp: , Rfl:     Multiple Vitamins-Minerals (AIRBORNE GUMMIES PO), Take 3 tablets by mouth daily, Disp: , Rfl:     vitamin B-12 (VITAMIN B-12) 1,000 mcg tablet, Take 1,000 mcg by mouth daily , Disp: , Rfl:     Empagliflozin (Jardiance) 25 MG TABS, TAKE ONE-HALF TABLET BY MOUTH DAILY **NOTE NEW STRENGTH AND DOSE**, Disp: , Rfl:     Multiple Vitamin (multivitamin) tablet, Take 1 tablet by mouth daily, Disp: , Rfl:     traMADol (ULTRAM) 50 mg tablet, Take 1 tablet (50 mg total) by mouth every 6 (six) hours as needed for moderate pain (Patient not taking: Reported on 10/23/2020), Disp: 120 tablet, Rfl: 0    ULTRA FRESH 0 5 % SOLN, as needed , Disp: , Rfl:   No Known Allergies  Past Surgical History:   Procedure Laterality Date    CYSTOSTOMY W/ BLADDER DILATION      basket extraction of calculus    FEMUR FRACTURE SURGERY      LITHOTRIPSY      PATELLA FRACTURE SURGERY      STONE ANALYSIS (HISTORICAL)           Review of Systems   Constitutional: Negative  HENT: Positive for tinnitus  Eyes: Negative  Respiratory: Negative  Cardiovascular: Negative  Gastrointestinal: Negative  Endocrine: Negative  Genitourinary: Negative  Musculoskeletal: Positive for gait problem (Uses orthotics to correct a leg length discrepancy)  Negative for arthralgias (Currently) and back pain (Not currently)  Skin: Negative  Allergic/Immunologic: Negative  Hematological: Negative  Psychiatric/Behavioral: Negative  Objective:      /58 (BP Location: Left arm, Patient Position: Sitting, Cuff Size: Standard)   Pulse (!) 109   Temp 98 °F (36 7 °C)   Ht 5' 8" (1 727 m)   Wt 91 2 kg (201 lb)   SpO2 97%   BMI 30 56 kg/m²  BMI Counseling: Body mass index is 30 56 kg/m²  The BMI        Physical Exam  Vitals signs reviewed  Constitutional:       General: He is not in acute distress  Appearance: Normal appearance  He is not ill-appearing, toxic-appearing or diaphoretic  HENT:      Head: Normocephalic and atraumatic  Right Ear: External ear normal       Left Ear: External ear normal       Nose: Nose normal    Eyes:      General: No scleral icterus  Conjunctiva/sclera: Conjunctivae normal       Pupils: Pupils are equal, round, and reactive to light  Neck:      Musculoskeletal: Neck supple  No neck rigidity or muscular tenderness  Cardiovascular:      Rate and Rhythm: Tachycardia present  Rhythm irregular  Pulses: Normal pulses  no weak pulses          Dorsalis pedis pulses are 2+ on the right side and 2+ on the left side  Posterior tibial pulses are 2+ on the right side and 2+ on the left side  Heart sounds: Normal heart sounds  No murmur  Pulmonary:      Effort: Pulmonary effort is normal  No respiratory distress  Breath sounds: Normal breath sounds  No wheezing, rhonchi or rales  Abdominal:      General: Abdomen is flat  There is no distension  Tenderness: There is no abdominal tenderness  Musculoskeletal:         General: No swelling or tenderness  Right lower leg: No edema  Left lower leg: No edema  Feet:      Right foot:      Skin integrity: No ulcer, skin breakdown, erythema, warmth, callus or dry skin  Left foot:      Skin integrity: No ulcer, skin breakdown, erythema, warmth, callus or dry skin  Lymphadenopathy:      Cervical: No cervical adenopathy  Skin:     General: Skin is warm  Capillary Refill: Capillary refill takes less than 2 seconds  Coloration: Skin is not jaundiced  Findings: No bruising, erythema or rash  Neurological:      General: No focal deficit present  Mental Status: He is alert and oriented to person, place, and time  Mental status is at baseline  Psychiatric:         Mood and Affect: Mood normal          Behavior: Behavior normal          Thought Content: Thought content normal          Judgment: Judgment normal        Patient's shoes and socks removed  Right Foot/Ankle   Right Foot Inspection  Skin Exam: skin normal and skin intact no dry skin, no warmth, no callus, no erythema, no maceration, no abnormal color, no pre-ulcer, no ulcer and no callus                          Toe Exam: ROM and strength within normal limitsno swelling, no tenderness, erythema and  no right toe deformity  Sensory   Vibration: intact  Proprioception: intact   Monofilament testing: intact  Vascular  Capillary refills: < 3 seconds  The right DP pulse is 2+  The right PT pulse is 2+  Left Foot/Ankle  Left Foot Inspection  Skin Exam: skin normal and skin intactno dry skin, no warmth, no erythema, no maceration, normal color, no pre-ulcer, no ulcer and no callus                         Toe Exam: ROM and strength within normal limitsno swelling, no tenderness, no erythema and no left toe deformity                   Sensory   Vibration: intact  Proprioception: intact  Monofilament: intact  Vascular  Capillary refills: < 3 seconds  The left DP pulse is 2+  The left PT pulse is 2+  Assign Risk Category:  No deformity present; No loss of protective sensation;  No weak pulses       Risk: 0

## 2021-04-16 NOTE — PROGRESS NOTES
Assessment and Plan:     Problem List Items Addressed This Visit        Endocrine    Type 2 diabetes mellitus with neurologic complication, without long-term current use of insulin (HCC) - Primary    Relevant Medications    Empagliflozin (Jardiance) 25 MG TABS    Other Relevant Orders    Ambulatory referral to Ophthalmology           Preventive health issues were discussed with patient, and age appropriate screening tests were ordered as noted in patient's After Visit Summary  Personalized health advice and appropriate referrals for health education or preventive services given if needed, as noted in patient's After Visit Summary       History of Present Illness:     Patient presents for Medicare Annual Wellness visit    Patient Care Team:  Rodney Desai MD as PCP - General (Internal Medicine)     Problem List:     Patient Active Problem List   Diagnosis    Right hip pain    Chronic pain    Coronary artery disease (CAD) excluded    Essential hypertension    Osteoarthrosis    Chronic atrial fibrillation (HCC)    PTSD (post-traumatic stress disorder)    Type 2 diabetes mellitus with neurologic complication, without long-term current use of insulin (HCC)    Tachycardia induced cardiomyopathy ([address] )    Scoliosis    Chronic right sacroiliac joint pain    Microscopic hematuria    Hepatitis C virus infection resolved after antiviral drug therapy    Chronic midline low back pain without sciatica    Bilateral nephrolithiasis    Adrenal nodule ([address] )    Callus of foot    Medicare annual wellness visit, subsequent    Benign prostatic hyperplasia with nocturia      Past Medical and Surgical History:     Past Medical History:   Diagnosis Date    Arthritis     Atrial fibrillation ([address] )     Diabetes 1 5, 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[address] )     Hepatitis C, acute     HTN (hypertension)     Neuropathy      Past Surgical History:   Procedure Laterality Date    CYSTOSTOMY W/ BLADDER DILATION      basket extraction of calculus    FEMUR FRACTURE SURGERY      LITHOTRIPSY      PATELLA FRACTURE SURGERY      STONE ANALYSIS (HISTORICAL)        Family History:     Family History   Problem Relation Age of Onset    Dementia Mother     Pneumonia Father         Acinetobacter    Diabetes Father     Colon cancer Sister     Lung cancer Sister     Breast cancer Sister     Lung cancer Brother       Social History:     E-Cigarette/Vaping    E-Cigarette Use Never User      E-Cigarette/Vaping Substances    Nicotine No     THC No     CBD No     Flavoring No     Other No     Unknown No      Social History     Socioeconomic History    Marital status: /Civil Union     Spouse name: Not on file    Number of children: Not on file    Years of education: Not on file    Highest education level: Not on file   Occupational History    Not on file   Social Needs    Financial resource strain: Not on file    Food insecurity     Worry: Not on file     Inability: Not on file   [employer] needs     Medical: Not on file     Non-medical: Not on file   Tobacco Use    Smoking status: Former Smoker     Types: Cigarettes     Quit date: 1977     Years since quittin 3    Smokeless tobacco: Never Used   Substance and Sexual Activity    Alcohol use: Not Currently    Drug use: Not Currently    Sexual activity: Not on file   Lifestyle    Physical activity     Days per week: Not on file     Minutes per session: Not on file    Stress: Not on file   Relationships    Social connections     Talks on phone: Not on file     Gets together: Not on file     Attends Oriental orthodox service: Not on file     Active member of club or organization: Not on file     Attends meetings of clubs or organizations: Not on file     Relationship status: Not on file    Intimate partner violence     Fear of current or ex partner: Not on file     Emotionally abused: Not on file     Physically abused: Not on file     Forced sexual activity: Not on file Other Topics Concern    Not on file   Social History Narrative    Not on file      Medications and Allergies:     Current Outpatient Medications   Medication Sig Dispense Refill    Empagliflozin (Jardiance) 25 MG TABS TAKE ONE-HALF TABLET BY MOUTH DAILY **NOTE NEW STRENGTH AND DOSE**      aspirin 81 MG tablet Take 162 mg by mouth 2 (two) times a day       cholecalciferol (VITAMIN D3) 1,000 units tablet Take 1,000 Units by mouth daily      COENZYME Q10 PO Take 1 capsule by mouth daily       diltiazem (TIAZAC) 240 MG 24 hr capsule Take 240 mg by mouth daily       Empagliflozin 10 MG TABS Take 10 mg by mouth every morning      glucose blood test strip 1 each by Other route daily       hydrochlorothiazide (HYDRODIURIL) 25 mg tablet Take 1 tablet (25 mg total) by mouth daily (Patient not taking: Reported on 10/27/2020) 30 tablet 5    metFORMIN (GLUCOPHAGE) 1000 MG tablet Take 1,000 mg by mouth 2 (two) times a day       Multiple Vitamin (multivitamin) tablet Take 1 tablet by mouth daily      Multiple Vitamins-Minerals (AIRBORNE GUMMIES PO) Take 3 tablets by mouth daily      traMADol (ULTRAM) 50 mg tablet Take 1 tablet (50 mg total) by mouth every 6 (six) hours as needed for moderate pain (Patient not taking: Reported on 10/23/2020) 120 tablet 0    ULTRA FRESH 0 5 % SOLN as needed       vitamin B-12 (VITAMIN B-12) 1,000 mcg tablet Take 1,000 mcg by mouth daily        No current facility-administered medications for this visit        No Known Allergies   Immunizations:     Immunization History   Administered Date(s) Administered    Hep B, adult 11/18/2015, 12/16/2015, 06/02/2016    Pneumococcal Conjugate 13-Valent 09/03/2015, 04/15/2019    Pneumococcal Polysaccharide PPV23 04/15/2016    Td (adult), Unspecified 01/01/2010    Zoster 02/16/2016    Zoster Vaccine Recombinant 09/03/2019, 12/08/2019      Health Maintenance:         Topic Date Due    Hepatitis C Screening  Never done    Colorectal Cancer Screening  04/25/2022         Topic Date Due    COVID-19 Vaccine (1) Never done    DTaP,Tdap,and Td Vaccines (1 - Tdap) 01/04/1970    Influenza Vaccine (1) Never done      Medicare Health Risk Assessment:     There were no vitals taken for this visit  Nik Westbrook is here for his Subsequent Wellness visit  Health Risk Assessment:   Patient rates overall health as good  Patient feels that their physical health rating is same  Patient is satisfied with their life  Eyesight was rated as same  Hearing was rated as same  Patient feels that their emotional and mental health rating is same  Patients states they are sometimes angry  Patient states they are never, rarely unusually tired/fatigued  Pain experienced in the last 7 days has been some  Patient's pain rating has been 4/10  Patient states that he has experienced no weight loss or gain in last 6 months  Depression Screening:   PHQ-2 Score: 2      Fall Risk Screening: In the past year, patient has experienced: no history of falling in past year      Home Safety:  Patient does not have trouble with stairs inside or outside of their home  Patient has working smoke alarms and has working carbon monoxide detector  Home safety hazards include: none  Nutrition:   Current diet is Diabetic, Low Carb and Unhealthy  Medications:   Patient is not currently taking any over-the-counter supplements  Patient is able to manage medications  Activities of Daily Living (ADLs)/Instrumental Activities of Daily Living (IADLs):   Walk and transfer into and out of bed and chair?: Yes  Dress and groom yourself?: Yes    Bathe or shower yourself?: Yes    Feed yourself?  Yes  Do your laundry/housekeeping?: Yes  Manage your money, pay your bills and track your expenses?: Yes  Make your own meals?: Yes    Do your own shopping?: Yes    Previous Hospitalizations:   Any hospitalizations or ED visits within the last 12 months?: No      Advance Care Planning:   Living will: No Durable POA for healthcare: No    Advanced directive: Yes    Advanced directive counseling given: Yes    Five wishes given: Yes    Patient declined ACP directive: Yes    End of Life Decisions reviewed with patient: Yes    Provider agrees with end of life decisions: Yes      Cognitive Screening:   Provider or family/friend/caregiver concerned regarding cognition?: No    PREVENTIVE SCREENINGS      Cardiovascular Screening:    General: Screening Current      Diabetes Screening:     General: Screening Not Indicated, History Diabetes and Screening Current      Colorectal Cancer Screening:     General: Screening Current      Prostate Cancer Screening:    General: Screening Current      Osteoporosis Screening:    General: Screening Not Indicated      Abdominal Aortic Aneurysm (AAA) Screening:    Risk factors include: age between 73-67 yo and tobacco use        Lung Cancer Screening:     General: Screening Not Indicated      Hepatitis C Screening:    General: Screening Not Indicated and History Hepatitis C    Screening, Brief Intervention, and Referral to Treatment (SBIRT)    Screening      Single Item Drug Screening:  How often have you used an illegal drug (including marijuana) or a prescription medication for non-medical reasons in the past year? never    Single Item Drug Screen Score: 0  Interpretation: Negative screen for possible drug use disorder      Demarcus Beltrán MD

## 2021-04-16 NOTE — ASSESSMENT & PLAN NOTE
Denies any chest pain or dyspnea    Does not wish to take any additional medication that could potentially result in hypotension

## 2021-04-16 NOTE — ASSESSMENT & PLAN NOTE
Was scheduled for laser lithotripsy however Urology canceled the appointment and has yet to reschedule  Pain is minimal at this point

## 2021-06-16 ENCOUNTER — TELEMEDICINE (OUTPATIENT)
Dept: INTERNAL MEDICINE CLINIC | Age: 71
End: 2021-06-16
Payer: MEDICARE

## 2021-06-16 VITALS — DIASTOLIC BLOOD PRESSURE: 80 MMHG | TEMPERATURE: 98.7 F | HEART RATE: 91 BPM | SYSTOLIC BLOOD PRESSURE: 129 MMHG

## 2021-06-16 DIAGNOSIS — B34.9 ACUTE VIRAL SYNDROME: ICD-10-CM

## 2021-06-16 DIAGNOSIS — J06.9 VIRAL UPPER RESPIRATORY TRACT INFECTION: Primary | ICD-10-CM

## 2021-06-16 PROCEDURE — 99442 PR PHYS/QHP TELEPHONE EVALUATION 11-20 MIN: CPT | Performed by: INTERNAL MEDICINE

## 2021-06-16 RX ORDER — DEXTROMETHORPHAN HYDROBROMIDE AND PROMETHAZINE HYDROCHLORIDE 15; 6.25 MG/5ML; MG/5ML
SYRUP ORAL
COMMUNITY
End: 2021-10-12

## 2021-06-16 NOTE — PROGRESS NOTES
Virtual Regular Visit    It was my intent to perform this visit via video technology but the patient was not able to do a video connection so the visit was completed via audio telephone only  Assessment/Plan:    Acute viral syndrome with upper respiratory tract infection   - already improving  - my suspicion is low for COVID-19 infection so I will hold off on ordering the test at this time especially since his daughter was tested yesterday and was negative and his symptoms are improving   - patient was counseled that management is symptomatic  - he was counseled to use over-the-counter Mucinex for cough and Flonase nasal spray for his rhinitis and Tylenol or ibuprofen with meals for headache, fever and pains   - he was counseled to keep well hydrated, wash his hands liberally with soap and water for at least 20 seconds at a time and to keep wearing a face mask in the presence of other people  -patient was counseled to go to the emergency department if he develops fever, shortness of breath, chest pain or an inability to tolerate oral intake   -he was counseled to call the office if his symptoms worsen or do not continue to improve at this might be an indication of a bacterial superinfection with the  need for antibiotics  Problem List Items Addressed This Visit        Respiratory    Viral upper respiratory tract infection - Primary       Other    Acute viral syndrome               Reason for visit is   Chief Complaint   Patient presents with    Virtual Brief Visit     cold  for 5 days, scratchy throat , ccoughing, sinus headache,  taking cough med- equate cough med compared to robuitussin     Virtual Regular Visit        Encounter provider Gómez Irvin DO    Provider located at [address] [facility] [id number]      Recent Visits  No visits were found meeting these conditions    Showing recent visits within past 7 days and meeting all other requirements  Today's Visits  Date Type Provider Dept   06/16/21 Telemedicine [facility] [place], DO [facility]   Showing today's visits and meeting all other requirements  Future Appointments  No visits were found meeting these conditions  Showing future appointments within next 150 days and meeting all other requirements       The patient was identified by name and date of birth  Rashida Marie was informed that this is a telemedicine visit and that the visit is being conducted through [address] [facility] and patient was informed that this is a secure, HIPAA-compliant platform  He agrees to proceed     My office door was closed  No one else was in the room  He acknowledged consent and understanding of privacy and security of the video platform  The patient has agreed to participate and understands they can discontinue the visit at any time  Patient is aware this is a billable service  Subjective  Rashida Marie is a 66 y o  male    HPI    Patient presents via  telephone visit after many minutes of trying to connect to him through [address] [place] [address] and [employer] without success with complaints of  Sore throat, rhinorrhea, cough, nasal congestion, clogged issues for the past 5 days  He states that the cough is productive of clear colored phlegm and admits to very mild wheezing without shortness of breath  He also admits to mild nausea and sinus headaches  He denies fever, chills, night sweats, chest pain, earache, palpitations,  Abdominal pain, diarrhea, constipation, myalgias  He states that his arthralgias chronic and unchanged  Of note, patient states that his daughter developed symptoms after him and she was tested for the COVID 19 infection yesterday and was negative  Of note, he has not had the COVID-19 Vaccine  Also of note, he states that he is already feeling better  He stopped smoking over 50 years ago        Past Medical History:   Diagnosis Date    Arthritis     Atrial fibrillation 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     Hepatitis C, acute     HTN (hypertension)     Neuropathy        Past Surgical History:   Procedure Laterality Date    CYSTOSTOMY W/ BLADDER DILATION      basket extraction of calculus    FEMUR FRACTURE SURGERY      LITHOTRIPSY      PATELLA FRACTURE SURGERY      STONE ANALYSIS (HISTORICAL)         Current Outpatient Medications   Medication Sig Dispense Refill    aspirin 81 MG tablet Take 162 mg by mouth 2 (two) times a day       cholecalciferol (VITAMIN D3) 1,000 units tablet Take 1,000 Units by mouth daily      COENZYME Q10 PO Take 1 capsule by mouth daily       diltiazem (TIAZAC) 240 MG 24 hr capsule Take 240 mg by mouth daily       Empagliflozin (Jardiance) 25 MG TABS TAKE ONE-HALF TABLET BY MOUTH DAILY **NOTE NEW STRENGTH AND DOSE**      glucose blood test strip 1 each by Other route daily       metFORMIN (GLUCOPHAGE) 1000 MG tablet Take 1,000 mg by mouth 2 (two) times a day       Multiple Vitamin (multivitamin) tablet Take 1 tablet by mouth daily      Multiple Vitamins-Minerals (AIRBORNE GUMMIES PO) Take 3 tablets by mouth daily      vitamin B-12 (VITAMIN B-12) 1,000 mcg tablet Take 1,000 mcg by mouth daily       promethazine-dextromethorphan (PHENERGAN-DM) 6 25-15 mg/5 mL oral syrup promethazine-DM 6 25 mg-15 mg/5 mL oral syrup      traMADol (ULTRAM) 50 mg tablet Take 1 tablet (50 mg total) by mouth every 6 (six) hours as needed for moderate pain (Patient not taking: Reported on 10/23/2020) 120 tablet 0    ULTRA FRESH 0 5 % SOLN as needed        No current facility-administered medications for this visit  No Known Allergies    Review of Systems   Constitutional: Negative for activity change, chills, diaphoresis, fatigue, fever (t max of 98 8) and unexpected weight change  HENT: Positive for congestion, rhinorrhea, sinus pressure and sore throat (getting better)   Negative for ear pain (clogged but without pain) and postnasal drip  Eyes: Negative for pain  Respiratory: Positive for cough (productive of clear colored phlegm) and wheezing (very little)  Negative for choking, chest tightness and shortness of breath  Quit smoking in 1977   Cardiovascular: Negative for chest pain, palpitations and leg swelling  Gastrointestinal: Positive for nausea (mild)  Negative for abdominal pain, constipation, diarrhea and vomiting  Genitourinary: Negative for dysuria and hematuria  Musculoskeletal: Positive for arthralgias (chronic and unchanged)  Negative for back pain, gait problem, joint swelling, myalgias and neck stiffness  Skin: Negative for pallor and rash  Neurological: Positive for headaches (sinus headache in the bridge of his nose)  Negative for dizziness, tremors, seizures, syncope and light-headedness  Hematological: Negative for adenopathy  Psychiatric/Behavioral: Negative for behavioral problems  Video Exam    Vitals:    06/16/21 1406   BP: 129/80   Pulse: 91   Temp: 98 7 °F (37 1 °C)   TempSrc: Oral       Physical Exam  Vitals reviewed  Patient was alert and oriented x3   There was no conversational dyspnea noted    I spent  18 minutes minutes with patient today in which greater than 50% of the time was spent in counseling/coordination of care regarding  prognosis, symptoms, treatment      VIRTUAL VISIT DISCLAIMER    Cory Polk acknowledges that he has consented to an online visit or consultation  He understands that the online visit is based solely on information provided by him, and that, in the absence of a face-to-face physical evaluation by the physician, the diagnosis he receives is both limited and provisional in terms of accuracy and completeness  This is not intended to replace a full medical face-to-face evaluation by the physician  Cory Polk understands and accepts these terms

## 2021-06-16 NOTE — PATIENT INSTRUCTIONS
- Drink plenty of fluids  - Get plenty of rest  - You may use salt water gargles for your sore throat  - You may use over the counter tylenol or Ibuprofen (motrin or Advil) for any headache, muscle aches and fever  - Call the office if your symptoms persist beyond a total of 7-10 days

## 2021-06-28 ENCOUNTER — TELEPHONE (OUTPATIENT)
Dept: INTERNAL MEDICINE CLINIC | Facility: CLINIC | Age: 71
End: 2021-06-28

## 2021-06-28 NOTE — TELEPHONE ENCOUNTER
Left message on machine for patient to call me at [place] office  Pt is due for a diabetic eye exam   Last eye exam on file was [facility] 5/13/2019  Please assist with scheduling

## 2021-10-12 ENCOUNTER — OFFICE VISIT (OUTPATIENT)
Dept: INTERNAL MEDICINE CLINIC | Facility: CLINIC | Age: 71
End: 2021-10-12
Payer: MEDICARE

## 2021-10-12 VITALS
SYSTOLIC BLOOD PRESSURE: 104 MMHG | HEART RATE: 92 BPM | OXYGEN SATURATION: 98 % | WEIGHT: 199.8 LBS | BODY MASS INDEX: 30.28 KG/M2 | HEIGHT: 68 IN | DIASTOLIC BLOOD PRESSURE: 68 MMHG | TEMPERATURE: 97.7 F

## 2021-10-12 DIAGNOSIS — I48.20 CHRONIC ATRIAL FIBRILLATION (HCC): ICD-10-CM

## 2021-10-12 DIAGNOSIS — G89.29 CHRONIC MIDLINE LOW BACK PAIN WITHOUT SCIATICA: ICD-10-CM

## 2021-10-12 DIAGNOSIS — I10 ESSENTIAL HYPERTENSION: ICD-10-CM

## 2021-10-12 DIAGNOSIS — Z00.00 MEDICARE ANNUAL WELLNESS VISIT, SUBSEQUENT: Primary | ICD-10-CM

## 2021-10-12 DIAGNOSIS — E11.40 TYPE 2 DIABETES MELLITUS WITH DIABETIC NEUROPATHY, WITHOUT LONG-TERM CURRENT USE OF INSULIN (HCC): ICD-10-CM

## 2021-10-12 DIAGNOSIS — M54.50 CHRONIC MIDLINE LOW BACK PAIN WITHOUT SCIATICA: ICD-10-CM

## 2021-10-12 DIAGNOSIS — N20.0 BILATERAL NEPHROLITHIASIS: ICD-10-CM

## 2021-10-12 DIAGNOSIS — Z11.59 NEED FOR HEPATITIS C SCREENING TEST: ICD-10-CM

## 2021-10-12 LAB — SL AMB POCT HEMOGLOBIN AIC: 6.7 (ref ?–6.5)

## 2021-10-12 PROCEDURE — G0438 PPPS, INITIAL VISIT: HCPCS | Performed by: INTERNAL MEDICINE

## 2021-10-12 PROCEDURE — 99214 OFFICE O/P EST MOD 30 MIN: CPT | Performed by: INTERNAL MEDICINE

## 2021-10-12 PROCEDURE — 1123F ACP DISCUSS/DSCN MKR DOCD: CPT | Performed by: INTERNAL MEDICINE

## 2021-10-12 PROCEDURE — 83036 HEMOGLOBIN GLYCOSYLATED A1C: CPT | Performed by: INTERNAL MEDICINE

## 2021-10-21 ENCOUNTER — TELEPHONE (OUTPATIENT)
Dept: INTERNAL MEDICINE CLINIC | Facility: CLINIC | Age: 71
End: 2021-10-21

## 2021-10-22 ENCOUNTER — APPOINTMENT (OUTPATIENT)
Dept: LAB | Facility: CLINIC | Age: 71
End: 2021-10-22
Payer: MEDICARE

## 2021-10-22 DIAGNOSIS — N20.0 BILATERAL NEPHROLITHIASIS: ICD-10-CM

## 2021-10-22 DIAGNOSIS — R35.1 BENIGN PROSTATIC HYPERPLASIA WITH NOCTURIA: ICD-10-CM

## 2021-10-22 DIAGNOSIS — E11.40 TYPE 2 DIABETES MELLITUS WITH DIABETIC NEUROPATHY, WITHOUT LONG-TERM CURRENT USE OF INSULIN (HCC): ICD-10-CM

## 2021-10-22 DIAGNOSIS — Z11.59 NEED FOR HEPATITIS C SCREENING TEST: ICD-10-CM

## 2021-10-22 DIAGNOSIS — M15.3 POST-TRAUMATIC OSTEOARTHRITIS OF MULTIPLE JOINTS: ICD-10-CM

## 2021-10-22 DIAGNOSIS — I10 ESSENTIAL HYPERTENSION: ICD-10-CM

## 2021-10-22 DIAGNOSIS — N40.1 BENIGN PROSTATIC HYPERPLASIA WITH NOCTURIA: ICD-10-CM

## 2021-10-22 DIAGNOSIS — I48.20 CHRONIC ATRIAL FIBRILLATION (HCC): ICD-10-CM

## 2021-10-22 LAB
ALBUMIN SERPL BCP-MCNC: 3.7 G/DL (ref 3.5–5)
ALP SERPL-CCNC: 61 U/L (ref 46–116)
ALT SERPL W P-5'-P-CCNC: 26 U/L (ref 12–78)
ANION GAP SERPL CALCULATED.3IONS-SCNC: 5 MMOL/L (ref 4–13)
AST SERPL W P-5'-P-CCNC: 11 U/L (ref 5–45)
BACTERIA UR QL AUTO: ABNORMAL /HPF
BILIRUB SERPL-MCNC: 0.7 MG/DL (ref 0.2–1)
BILIRUB UR QL STRIP: NEGATIVE
BUN SERPL-MCNC: 14 MG/DL (ref 5–25)
CALCIUM SERPL-MCNC: 9 MG/DL (ref 8.3–10.1)
CAOX CRY URNS QL MICRO: ABNORMAL /HPF
CHLORIDE SERPL-SCNC: 110 MMOL/L (ref 100–108)
CHOLEST SERPL-MCNC: 212 MG/DL (ref 50–200)
CLARITY UR: CLEAR
CO2 SERPL-SCNC: 24 MMOL/L (ref 21–32)
COLOR UR: YELLOW
CREAT SERPL-MCNC: 0.77 MG/DL (ref 0.6–1.3)
CREAT UR-MCNC: 160 MG/DL
ERYTHROCYTE [DISTWIDTH] IN BLOOD BY AUTOMATED COUNT: 13.2 % (ref 11.6–15.1)
GFR SERPL CREATININE-BSD FRML MDRD: 91 ML/MIN/1.73SQ M
GLUCOSE P FAST SERPL-MCNC: 103 MG/DL (ref 65–99)
GLUCOSE UR STRIP-MCNC: ABNORMAL MG/DL
HCT VFR BLD AUTO: 46.5 % (ref 36.5–49.3)
HDLC SERPL-MCNC: 47 MG/DL
HGB BLD-MCNC: 15.6 G/DL (ref 12–17)
HGB UR QL STRIP.AUTO: ABNORMAL
KETONES UR STRIP-MCNC: ABNORMAL MG/DL
LDLC SERPL CALC-MCNC: 151 MG/DL (ref 0–100)
LEUKOCYTE ESTERASE UR QL STRIP: ABNORMAL
MAGNESIUM SERPL-MCNC: 2.3 MG/DL (ref 1.6–2.6)
MCH RBC QN AUTO: 32.4 PG (ref 26.8–34.3)
MCHC RBC AUTO-ENTMCNC: 33.5 G/DL (ref 31.4–37.4)
MCV RBC AUTO: 97 FL (ref 82–98)
MICROALBUMIN UR-MCNC: 109 MG/L (ref 0–20)
MICROALBUMIN/CREAT 24H UR: 68 MG/G CREATININE (ref 0–30)
MUCOUS THREADS UR QL AUTO: ABNORMAL
NITRITE UR QL STRIP: NEGATIVE
NON-SQ EPI CELLS URNS QL MICRO: ABNORMAL /HPF
PH UR STRIP.AUTO: 5.5 [PH]
PLATELET # BLD AUTO: 173 THOUSANDS/UL (ref 149–390)
PMV BLD AUTO: 11.6 FL (ref 8.9–12.7)
POTASSIUM SERPL-SCNC: 3.7 MMOL/L (ref 3.5–5.3)
PROT SERPL-MCNC: 7.1 G/DL (ref 6.4–8.2)
PROT UR STRIP-MCNC: ABNORMAL MG/DL
RBC # BLD AUTO: 4.82 MILLION/UL (ref 3.88–5.62)
RBC #/AREA URNS AUTO: ABNORMAL /HPF
SODIUM SERPL-SCNC: 139 MMOL/L (ref 136–145)
SP GR UR STRIP.AUTO: 1.04 (ref 1–1.03)
TRIGL SERPL-MCNC: 68 MG/DL
UROBILINOGEN UR QL STRIP.AUTO: 1 E.U./DL
WBC # BLD AUTO: 5.17 THOUSAND/UL (ref 4.31–10.16)
WBC #/AREA URNS AUTO: ABNORMAL /HPF

## 2021-10-22 PROCEDURE — 85027 COMPLETE CBC AUTOMATED: CPT

## 2021-10-22 PROCEDURE — 84153 ASSAY OF PSA TOTAL: CPT

## 2021-10-22 PROCEDURE — 82570 ASSAY OF URINE CREATININE: CPT

## 2021-10-22 PROCEDURE — 80053 COMPREHEN METABOLIC PANEL: CPT

## 2021-10-22 PROCEDURE — 36415 COLL VENOUS BLD VENIPUNCTURE: CPT

## 2021-10-22 PROCEDURE — 86803 HEPATITIS C AB TEST: CPT

## 2021-10-22 PROCEDURE — 81001 URINALYSIS AUTO W/SCOPE: CPT

## 2021-10-22 PROCEDURE — 80061 LIPID PANEL: CPT

## 2021-10-22 PROCEDURE — 83735 ASSAY OF MAGNESIUM: CPT

## 2021-10-22 PROCEDURE — 84154 ASSAY OF PSA FREE: CPT

## 2021-10-22 PROCEDURE — 82043 UR ALBUMIN QUANTITATIVE: CPT

## 2021-10-23 LAB — HCV AB SER QL: ABNORMAL

## 2021-10-25 LAB
PSA FREE MFR SERPL: 20 %
PSA FREE SERPL-MCNC: 0.22 NG/ML
PSA SERPL-MCNC: 1.1 NG/ML (ref 0–4)

## 2021-10-26 ENCOUNTER — TELEPHONE (OUTPATIENT)
Dept: ADMINISTRATIVE | Facility: OTHER | Age: 71
End: 2021-10-26

## 2021-11-05 ENCOUNTER — NURSE TRIAGE (OUTPATIENT)
Dept: OTHER | Facility: OTHER | Age: 71
End: 2021-11-05

## 2021-11-17 ENCOUNTER — OFFICE VISIT (OUTPATIENT)
Dept: UROLOGY | Facility: MEDICAL CENTER | Age: 71
End: 2021-11-17
Payer: MEDICARE

## 2021-11-17 VITALS
DIASTOLIC BLOOD PRESSURE: 80 MMHG | HEART RATE: 78 BPM | HEIGHT: 68 IN | BODY MASS INDEX: 29.4 KG/M2 | WEIGHT: 194 LBS | SYSTOLIC BLOOD PRESSURE: 112 MMHG

## 2021-11-17 DIAGNOSIS — R31.0 GROSS HEMATURIA: Primary | ICD-10-CM

## 2021-11-17 DIAGNOSIS — N20.0 KIDNEY STONES: ICD-10-CM

## 2021-11-17 LAB
SL AMB  POCT GLUCOSE, UA: 500
SL AMB LEUKOCYTE ESTERASE,UA: ABNORMAL
SL AMB POCT BILIRUBIN,UA: ABNORMAL
SL AMB POCT BLOOD,UA: ABNORMAL
SL AMB POCT CLARITY,UA: CLEAR
SL AMB POCT COLOR,UA: YELLOW
SL AMB POCT KETONES,UA: ABNORMAL
SL AMB POCT NITRITE,UA: ABNORMAL
SL AMB POCT PH,UA: 6
SL AMB POCT SPECIFIC GRAVITY,UA: 1.02
SL AMB POCT URINE PROTEIN: ABNORMAL
SL AMB POCT UROBILINOGEN: 0.2

## 2021-11-17 PROCEDURE — 81003 URINALYSIS AUTO W/O SCOPE: CPT | Performed by: PHYSICIAN ASSISTANT

## 2021-11-17 PROCEDURE — 99214 OFFICE O/P EST MOD 30 MIN: CPT | Performed by: PHYSICIAN ASSISTANT

## 2021-11-19 ENCOUNTER — HOSPITAL ENCOUNTER (OUTPATIENT)
Dept: RADIOLOGY | Age: 71
Discharge: HOME | End: 2021-11-19
Payer: MEDICARE

## 2021-11-19 DIAGNOSIS — N20.0 KIDNEY STONES: ICD-10-CM

## 2021-11-19 PROCEDURE — G1004 CDSM NDSC: HCPCS

## 2021-11-19 PROCEDURE — 74176 CT ABD & PELVIS W/O CONTRAST: CPT

## 2021-11-26 ENCOUNTER — APPOINTMENT (OUTPATIENT)
Dept: LAB | Facility: CLINIC | Age: 71
End: 2021-11-26
Payer: MEDICARE

## 2021-11-26 ENCOUNTER — OFFICE VISIT (OUTPATIENT)
Dept: INTERNAL MEDICINE CLINIC | Facility: CLINIC | Age: 71
End: 2021-11-26
Payer: MEDICARE

## 2021-11-26 VITALS
DIASTOLIC BLOOD PRESSURE: 84 MMHG | HEART RATE: 99 BPM | OXYGEN SATURATION: 98 % | WEIGHT: 191.8 LBS | HEIGHT: 68 IN | TEMPERATURE: 96.8 F | BODY MASS INDEX: 29.07 KG/M2 | SYSTOLIC BLOOD PRESSURE: 130 MMHG

## 2021-11-26 DIAGNOSIS — S60.862A TICK BITE OF LEFT WRIST, INITIAL ENCOUNTER: ICD-10-CM

## 2021-11-26 DIAGNOSIS — R51.9 INTRACTABLE HEADACHE, UNSPECIFIED CHRONICITY PATTERN, UNSPECIFIED HEADACHE TYPE: ICD-10-CM

## 2021-11-26 DIAGNOSIS — S60.862A TICK BITE OF LEFT WRIST, INITIAL ENCOUNTER: Primary | ICD-10-CM
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
## 2021-11-26 DIAGNOSIS — F11.20 CONTINUOUS OPIOID DEPENDENCE (HCC): ICD-10-CM

## 2021-11-26 LAB
ALBUMIN SERPL BCP-MCNC: 3.9 G/DL (ref 3.5–5)
ALP SERPL-CCNC: 68 U/L (ref 46–116)
ALT SERPL W P-5'-P-CCNC: 21 U/L (ref 12–78)
ANION GAP SERPL CALCULATED.3IONS-SCNC: 5 MMOL/L (ref 4–13)
AST SERPL W P-5'-P-CCNC: 8 U/L (ref 5–45)
BASOPHILS # BLD AUTO: 0.06 THOUSANDS/ΜL (ref 0–0.1)
BASOPHILS NFR BLD AUTO: 1 % (ref 0–1)
BILIRUB SERPL-MCNC: 0.93 MG/DL (ref 0.2–1)
BUN SERPL-MCNC: 15 MG/DL (ref 5–25)
CALCIUM SERPL-MCNC: 9.1 MG/DL (ref 8.3–10.1)
CHLORIDE SERPL-SCNC: 105 MMOL/L (ref 100–108)
CO2 SERPL-SCNC: 27 MMOL/L (ref 21–32)
CREAT SERPL-MCNC: 0.77 MG/DL (ref 0.6–1.3)
EOSINOPHIL # BLD AUTO: 0.11 THOUSAND/ΜL (ref 0–0.61)
EOSINOPHIL NFR BLD AUTO: 2 % (ref 0–6)
ERYTHROCYTE [DISTWIDTH] IN BLOOD BY AUTOMATED COUNT: 13.1 % (ref 11.6–15.1)
GFR SERPL CREATININE-BSD FRML MDRD: 91 ML/MIN/1.73SQ M
GLUCOSE P FAST SERPL-MCNC: 120 MG/DL (ref 65–99)
HCT VFR BLD AUTO: 48.9 % (ref 36.5–49.3)
HGB BLD-MCNC: 16.3 G/DL (ref 12–17)
IMM GRANULOCYTES # BLD AUTO: 0.02 THOUSAND/UL (ref 0–0.2)
IMM GRANULOCYTES NFR BLD AUTO: 0 % (ref 0–2)
LYMPHOCYTES # BLD AUTO: 1.65 THOUSANDS/ΜL (ref 0.6–4.47)
LYMPHOCYTES NFR BLD AUTO: 29 % (ref 14–44)
MCH RBC QN AUTO: 32.3 PG (ref 26.8–34.3)
MCHC RBC AUTO-ENTMCNC: 33.3 G/DL (ref 31.4–37.4)
MCV RBC AUTO: 97 FL (ref 82–98)
MONOCYTES # BLD AUTO: 0.5 THOUSAND/ΜL (ref 0.17–1.22)
MONOCYTES NFR BLD AUTO: 9 % (ref 4–12)
NEUTROPHILS # BLD AUTO: 3.29 THOUSANDS/ΜL (ref 1.85–7.62)
NEUTS SEG NFR BLD AUTO: 59 % (ref 43–75)
NRBC BLD AUTO-RTO: 0 /100 WBCS
PLATELET # BLD AUTO: 172 THOUSANDS/UL (ref 149–390)
PMV BLD AUTO: 11.2 FL (ref 8.9–12.7)
POTASSIUM SERPL-SCNC: 4.3 MMOL/L (ref 3.5–5.3)
PROT SERPL-MCNC: 7.5 G/DL (ref 6.4–8.2)
RBC # BLD AUTO: 5.04 MILLION/UL (ref 3.88–5.62)
SODIUM SERPL-SCNC: 137 MMOL/L (ref 136–145)
WBC # BLD AUTO: 5.63 THOUSAND/UL (ref 4.31–10.16)

## 2021-11-26 PROCEDURE — 36415 COLL VENOUS BLD VENIPUNCTURE: CPT | Performed by: NURSE PRACTITIONER

## 2021-11-26 PROCEDURE — 0241U HB NFCT DS VIR RESP RNA 4 TRGT: CPT | Performed by: NURSE PRACTITIONER

## 2021-11-26 PROCEDURE — 99213 OFFICE O/P EST LOW 20 MIN: CPT | Performed by: NURSE PRACTITIONER

## 2021-11-26 PROCEDURE — 86618 LYME DISEASE ANTIBODY: CPT

## 2021-11-26 PROCEDURE — 80053 COMPREHEN METABOLIC PANEL: CPT

## 2021-11-26 PROCEDURE — 85025 COMPLETE CBC W/AUTO DIFF WBC: CPT | Performed by: NURSE PRACTITIONER

## 2021-11-27 LAB
B BURGDOR IGG+IGM SER-ACNC: 40
FLUAV RNA RESP QL NAA+PROBE: NEGATIVE
FLUBV RNA RESP QL NAA+PROBE: NEGATIVE
RSV RNA RESP QL NAA+PROBE: NEGATIVE
SARS-COV-2 RNA RESP QL NAA+PROBE: NEGATIVE

## 2021-11-29 ENCOUNTER — TELEPHONE (OUTPATIENT)
Dept: INTERNAL MEDICINE CLINIC | Facility: CLINIC | Age: 71
End: 2021-11-29

## 2021-12-03 ENCOUNTER — OFFICE VISIT (OUTPATIENT)
Dept: INTERNAL MEDICINE CLINIC | Facility: CLINIC | Age: 71
End: 2021-12-03
Payer: MEDICARE

## 2021-12-03 VITALS
HEART RATE: 108 BPM | HEIGHT: 68 IN | OXYGEN SATURATION: 98 % | TEMPERATURE: 97.6 F | WEIGHT: 194.2 LBS | BODY MASS INDEX: 29.43 KG/M2 | SYSTOLIC BLOOD PRESSURE: 110 MMHG | DIASTOLIC BLOOD PRESSURE: 60 MMHG
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
## 2021-12-03 DIAGNOSIS — R51.9 INTRACTABLE HEADACHE, UNSPECIFIED CHRONICITY PATTERN, UNSPECIFIED HEADACHE TYPE: ICD-10-CM

## 2021-12-03 DIAGNOSIS — S60.862A TICK BITE OF LEFT WRIST, INITIAL ENCOUNTER: Primary | ICD-10-CM

## 2021-12-03 PROCEDURE — 99213 OFFICE O/P EST LOW 20 MIN: CPT | Performed by: NURSE PRACTITIONER

## 2021-12-06 ENCOUNTER — TREATMENT (OUTPATIENT)
Dept: UROLOGY | Facility: MEDICAL CENTER | Age: 71
End: 2021-12-06

## 2021-12-06 DIAGNOSIS — N20.0 BILATERAL NEPHROLITHIASIS: Primary | ICD-10-CM

## 2021-12-06 NOTE — PROGRESS NOTES
11/17/2021      Chief Complaint   Patient presents with    Blood in Urine    Nephrolithiasis         Assessment and Plan    67 y o  male managed by Dr Palmira Andre     1  Bilateral nephrolithiasis  · Urine today: large blood  · Last diagnostic imaging 9/9/2020  · Right: 12 3 x 7 7 mm renal pelvis calculus  · Left: 8 1 x 6 5 mm lower pole calculus  · Reviewed surgical options for stone intervention  · Will order CT scan for more recent imaging to help in surgical planning  · Will follow up with results  History of Present Illness  Jeni David is a 67 y o  male here for evaluation of bilateral nephrolithiasis  Patient with history of bilateral nehprolithiasis, previously evaluated by Dr Palmira Andre on 10/27/2020 where they discussed different surgical options  He recommended ureteroscopy at that time  Patient wished to discuss with his wife prior to proceeding with any surgical intervention  He presents today to revisit the idea of surgical intervention  He does report intermittent bilateral flank "twiges" he best describes not as pain but awareness that his kidneys are there x 1 year  He denied any episodes of gross hematuria or signs of infection including fevers or chills  He experiences nocturia 2-4x per night but denies any other urinary symptoms  He denies any abdominal pain, nausea, or vomiting  Patient wishes to proceed with surgical intervention of his bilateral nephrolithiasis  He verbalized understanding of need for repeat diagnostic imaging prior to surgical planning  Review of Systems   Constitutional: Negative for chills and fever  HENT: Negative  Respiratory: Negative  Cardiovascular: Negative  Gastrointestinal: Positive for abdominal pain (intermittent nonspecific abdominal pain, does not find correlation with dietary triggers)  Negative for nausea and vomiting     Genitourinary: Positive for flank pain (bilateral intermittent flank twinges, denies pain but rather notes awareness of his flank area x 1 year)  Negative for dysuria, frequency, hematuria (Does report 2 episodes of dark brown urine which he attributes to dehydration), scrotal swelling, testicular pain and urgency  Nocturia 2-4x per night  Denies sensation of incomplete bladder emptying  Musculoskeletal: Positive for neck pain (chronic)  Skin: Negative  Allergic/Immunologic: Positive for immunocompromised state (T2DM managed on PO meds)  Neurological: Negative for dizziness and light-headedness  AUA SYMPTOM SCORE      Most Recent Value   AUA SYMPTOM SCORE    How often have you had a sensation of not emptying your bladder completely after you finished urinating? 0   How often have you had to urinate again less than two hours after you finished urinating? 1   How often have you found you stopped and started again several times when you urinate? 0   How often have you found it difficult to postpone urination? 0   How often have you had a weak urinary stream? 0   How often have you had to push or strain to begin urination? 0   How many times did you most typically get up to urinate from the time you went to bed at night until the time you got up in the morning? 3   Quality of Life: If you were to spend the rest of your life with your urinary condition just the way it is now, how would you feel about that? --   AUA SYMPTOM SCORE 4           Vitals  Vitals:    11/17/21 1335   BP: 112/80   Pulse: 78   Weight: 88 kg (194 lb)   Height: 5' 8" (1 727 m)       Physical Exam  Vitals reviewed  Constitutional:       General: He is not in acute distress  Appearance: Normal appearance  He is not ill-appearing, toxic-appearing or diaphoretic  HENT:      Head: Normocephalic and atraumatic  Eyes:      Conjunctiva/sclera: Conjunctivae normal    Pulmonary:      Effort: Pulmonary effort is normal  No respiratory distress  Abdominal:      General: There is no distension  Palpations: Abdomen is soft  Tenderness: There is no abdominal tenderness (notes discomfort, denies tenderness with generalized palpation)  There is no right CVA tenderness, left CVA tenderness, guarding or rebound  Musculoskeletal:         General: Normal range of motion  Cervical back: Normal range of motion  Skin:     General: Skin is warm and dry  Neurological:      General: No focal deficit present  Mental Status: He is alert and oriented to person, place, and time  Psychiatric:         Mood and Affect: Mood normal          Behavior: Behavior normal          Thought Content:  Thought content normal          Judgment: Judgment normal        Past History  Past Medical History:   Diagnosis Date    Arthritis     Atrial fibrillation ([place] [address] )     Diabetes 1 5, 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[place] [address] )     Hepatitis C, acute     HTN (hypertension)     Neuropathy      Social History     Socioeconomic History    Marital status: /Civil Union     Spouse name: None    Number of children: None    Years of education: None    Highest education level: None   Occupational History    None   Tobacco Use    Smoking status: Former Smoker     Packs/day: 0 50     Years: 5 00     Pack years: 2 50     Types: Cigarettes     Quit date: 1977     Years since quittin 0    Smokeless tobacco: Never Used   Vaping Use    Vaping Use: Never used   Substance and Sexual Activity    Alcohol use: Not Currently     Comment: 1 per month    Drug use: Not Currently    Sexual activity: None   Other Topics Concern    None   Social History Narrative    None     Social Determinants of Health     Financial Resource Strain: Not on file   Food Insecurity: Not on file   Transportation Needs: Not on file   Physical Activity: Not on file   Stress: Not on file   Social Connections: Not on file   Intimate Partner Violence: Not on file   Housing Stability: Not on file     Social History     Tobacco Use   Smoking Status Former Smoker    Packs/day: 0 50    Years: 5 00    Pack years: 2 50    Types: Cigarettes    Quit date: 1977    Years since quittin 0   Smokeless Tobacco Never Used     Family History   Problem Relation Age of Onset    Dementia Mother     Pneumonia Father         Acinetobacter    Diabetes Father     Colon cancer Sister     Lung cancer Sister     Breast cancer Sister     Lung cancer Brother     Bone cancer Daughter        The following portions of the patient's history were reviewed and updated as appropriate: allergies, current medications, past medical history, past social history, past surgical history and problem list     Results  No results found for this or any previous visit (from the past 1 hour(s)) ]  Lab Results   Component Value Date    PSA 1 1 10/22/2021    PSA 1 0 2020    PSA 0 9 2020    PSA 0 9 02/10/2020     Lab Results   Component Value Date    CALCIUM 9 1 2021    K 4 3 2021    CO2 27 2021     2021    BUN 15 2021    CREATININE 0 77 2021     Lab Results   Component Value Date    WBC 5 63 2021    HGB 16 3 2021    HCT 48 9 2021    MCV 97 2021     2021     Elder Landry PA-C

## 2021-12-30 ENCOUNTER — TELEPHONE (OUTPATIENT)
Dept: INTERNAL MEDICINE CLINIC | Age: 71
End: 2021-12-30

## 2022-01-21 ENCOUNTER — APPOINTMENT (OUTPATIENT)
Dept: LAB | Facility: CLINIC | Age: 72
End: 2022-01-21
Payer: MEDICARE

## 2022-01-21 DIAGNOSIS — S60.862A TICK BITE OF LEFT WRIST, INITIAL ENCOUNTER: ICD-10-CM
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
## 2022-01-21 PROCEDURE — 86618 LYME DISEASE ANTIBODY: CPT

## 2022-01-21 PROCEDURE — 36415 COLL VENOUS BLD VENIPUNCTURE: CPT

## 2022-01-22 LAB — B BURGDOR IGG+IGM SER-ACNC: 34

## 2022-01-24 ENCOUNTER — TELEPHONE (OUTPATIENT)
Dept: INTERNAL MEDICINE CLINIC | Facility: CLINIC | Age: 72
End: 2022-01-24

## 2022-01-26 ENCOUNTER — TELEMEDICINE (OUTPATIENT)
Dept: INTERNAL MEDICINE CLINIC | Facility: OTHER | Age: 72
End: 2022-01-26
Payer: MEDICARE

## 2022-01-26 VITALS — HEIGHT: 68 IN | WEIGHT: 194 LBS | TEMPERATURE: 98.2 F | BODY MASS INDEX: 29.4 KG/M2

## 2022-01-26 DIAGNOSIS — Z20.822 EXPOSURE TO COVID-19 VIRUS: ICD-10-CM

## 2022-01-26 DIAGNOSIS — B34.9 VIRAL INFECTION, UNSPECIFIED: ICD-10-CM

## 2022-01-26 PROCEDURE — 99442 PR PHYS/QHP TELEPHONE EVALUATION 11-20 MIN: CPT | Performed by: NURSE PRACTITIONER

## 2022-01-26 PROCEDURE — U0005 INFEC AGEN DETEC AMPLI PROBE: HCPCS | Performed by: NURSE PRACTITIONER

## 2022-01-26 PROCEDURE — U0003 INFECTIOUS AGENT DETECTION BY NUCLEIC ACID (DNA OR RNA); SEVERE ACUTE RESPIRATORY SYNDROME CORONAVIRUS 2 (SARS-COV-2) (CORONAVIRUS DISEASE [COVID-19]), AMPLIFIED PROBE TECHNIQUE, MAKING USE OF HIGH THROUGHPUT TECHNOLOGIES AS DESCRIBED BY CMS-2020-01-R: HCPCS | Performed by: NURSE PRACTITIONER

## 2022-01-26 NOTE — PROGRESS NOTES
COVID-19 Outpatient Progress Note    Assessment/Plan:    Problem List Items Addressed This Visit     None      Visit Diagnoses     Exposure to COVID-19 virus        Relevant Orders    COVID Only- Collected at [facility]    Viral infection, unspecified        Relevant Orders    COVID Only- Collected at [facility]         Disposition:     Referred patient to centralized site to test for COVID-19  Illness appears to be viral in nature  Rest and fluids advised  Educated that the course of this illness could be 2-4 weeks  Discussed symptomatic relief, such as warm steam inhalations, tylenol/ibuprofen for fevers and body aches, rest, and drink plenty of fluids  Warm salt gargles for sore throat  Discussed red flag signs to go to the ER, such as chest pain or shortness of breath  Return to the office for reevaluation if symptoms worsen or do not improve in 1-2 weeks       I have spent 15 minutes directly with the patient  Encounter provider YESSENIA Rios    Provider located at [address] [place] [id number]    Recent Visits  Date Type Provider Dept   01/24/22 Telephone MD [facility]   Showing recent visits within past 7 days and meeting all other requirements  Today's Visits  Date Type Provider Dept   01/26/22 [address]  YESSENIA Parrish [facility]   Showing today's visits and meeting all other requirements  Future Appointments  No visits were found meeting these conditions  Showing future appointments within next 150 days and meeting all other requirements     This virtual check-in was done via telephone and he agrees to proceed      Patient agrees to participate in a virtual check in via telephone or video visit instead of presenting to the office to address urgent/immediate medical needs  Patient is aware this is a billable service  After connecting through Telephone, the patient was identified by name and date of birth  Tracy Beasley was informed that this was a telemedicine visit and that the exam was being conducted confidentially over secure lines  My office door was closed  No one else was in the room  Tracy Beasley acknowledged consent and understanding of privacy and security of the telemedicine visit  I informed the patient that I have reviewed his record in Epic and presented the opportunity for him to ask any questions regarding the visit today  The patient agreed to participate  Verification of patient location:  Patient is located in the following state in which I hold an active license: [place]    Subjective:   Tracy Beasley is a 67 y o  male who is concerned about COVID-19  Patient's symptoms include fever (100 2), chills, fatigue, malaise, rhinorrhea, loss of taste, cough, myalgias and headache  Patient denies congestion, sore throat, anosmia, shortness of breath, chest tightness, abdominal pain, nausea, vomiting and diarrhea       - Date of symptom onset: 1/17/2022      COVID-19 vaccination status: Not vaccinated    Exposure:   Contact with a person who is under investigation (PUI) for or who is positive for COVID-19 within the last 14 days?: Yes    Hospitalized recently for fever and/or lower respiratory symptoms?: No      Currently a healthcare worker that is involved in direct patient care?: No      Works in a special setting where the risk of COVID-19 transmission may be high? (this may include long-term care, correctional and [facility] facilities; homeless shelters; assisted-living facilities and group homes ): No      Resident in a special setting where the risk of COVID-19 transmission may be high? (this may include long-term care, correctional and [facility] facilities; homeless shelters; assisted-living facilities and group homes ): No Has been taking Mucinex     Did not have flu shot     Reports that his daughter who lives with him has covid    Lab Results   Component Value Date    SARSCOV2 Negative 11/26/2021     Past Medical History:   Diagnosis Date    Arthritis     At[facility] fibrillation [facility] [address] )     Diabetes 1 5, 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[facility] )     Hepatitis C, acute     HTN (hypertension)     Neuropathy      Past Surgical History:   Procedure Laterality Date    CYSTOSTOMY W/ BLADDER DILATION      basket extraction of calculus    FEMUR FRACTURE SURGERY      LITHOTRIPSY      PATELLA FRACTURE SURGERY      STONE ANALYSIS (HISTORICAL)       Current Outpatient Medications   Medication Sig Dispense Refill    Ascorbic Acid, Vitamin C, (VITAMIN C) 100 MG tablet Take by mouth daily      aspirin 81 MG tablet Take 162 mg by mouth 2 (two) times a day       cholecalciferol (VITAMIN D3) 1,000 units tablet Take 2,000 Units by mouth daily       COENZYME Q10 PO Take 1 capsule by mouth daily       diltiazem (TIAZAC) 240 MG 24 hr capsule Take 240 mg by mouth daily       glucose blood test strip Accu-Chek Guide test once daily      glucose blood test strip USE 1 STRIP SUBCUTANEOUSLY AS DIRECTED PROVIDER WANTS PATIENT TO TEST DAILY AND IS ADJUSTING ORAL DIABETIC MEDICATIONS      metFORMIN (GLUCOPHAGE) 1000 MG tablet Take 1,000 mg by mouth 2 (two) times a day       Multiple Vitamin (multivitamin) tablet Take 1 tablet by mouth daily        Multiple Vitamins-Minerals (AIRBORNE GUMMIES PO) Take 3 tablets by mouth daily      NON FORMULARY Red Beet Powder daily      traMADol (ULTRAM) 50 mg tablet Take 1 tablet (50 mg total) by mouth every 6 (six) hours as needed for moderate pain 120 tablet 0    vitamin B-12 (VITAMIN B-12) 1,000 mcg tablet Take 1,000 mcg by mouth daily       zinc gluconate 50 mg tablet Take by mouth daily      Empagliflozin (Jardiance) 25 MG TABS TAKE ONE-HALF TABLET BY MOUTH DAILY **NOTE NEW STRENGTH AND DOSE**       No current facility-administered medications for this visit  No Known Allergies    Review of Systems   Constitutional: Positive for chills, fatigue and fever (100 2)  Negative for activity change, appetite change and diaphoresis  HENT: Positive for rhinorrhea  Negative for congestion, ear discharge, ear pain, postnasal drip, sinus pressure, sinus pain and sore throat  Eyes: Negative for pain, discharge, itching and visual disturbance  Respiratory: Positive for cough  Negative for chest tightness, shortness of breath and wheezing  Cardiovascular: Negative for chest pain, palpitations and leg swelling  Gastrointestinal: Negative for abdominal pain, constipation, diarrhea, nausea and vomiting  Endocrine: Negative for polydipsia, polyphagia and polyuria  Genitourinary: Negative for difficulty urinating, dysuria and urgency  Musculoskeletal: Positive for myalgias  Negative for arthralgias, back pain and neck pain  Skin: Negative for rash and wound  Neurological: Positive for headaches  Negative for dizziness, weakness and numbness  Objective:    Vitals:    01/26/22 1758   Temp: 98 2 °F (36 8 °C)   Weight: 88 kg (194 lb)   Height: 5' 8" (1 727 m)       Physical Exam  Vitals reviewed  Neurological:      Mental Status: He is alert and oriented to person, place, and time  VIRTUAL VISIT DISCLAIMER    Victor Manuel Mcmanus verbally agrees to participate in [employer]  Pt is aware that [employer] could be limited without vital signs or the ability to perform a full hands-on physical [employer] understands he or the provider may request at any time to terminate the video visit and request the patient to seek care or treatment in person

## 2022-01-27 ENCOUNTER — TELEPHONE (OUTPATIENT)
Dept: INTERNAL MEDICINE CLINIC | Facility: OTHER | Age: 72
End: 2022-01-27

## 2022-01-27 ENCOUNTER — TELEMEDICINE (OUTPATIENT)
Dept: INTERNAL MEDICINE CLINIC | Facility: OTHER | Age: 72
End: 2022-01-27
Payer: MEDICARE

## 2022-01-27 VITALS — WEIGHT: 194 LBS | HEIGHT: 68 IN | TEMPERATURE: 99.9 F | BODY MASS INDEX: 29.4 KG/M2

## 2022-01-27 DIAGNOSIS — U07.1 COVID-19 VIRUS INFECTION: Primary | ICD-10-CM

## 2022-01-27 DIAGNOSIS — Z28.21 COVID-19 VACCINATION REFUSED: ICD-10-CM

## 2022-01-27 PROCEDURE — 99213 OFFICE O/P EST LOW 20 MIN: CPT | Performed by: FAMILY MEDICINE

## 2022-01-27 RX ORDER — ONDANSETRON 4 MG/1
4 TABLET, FILM COATED ORAL EVERY 8 HOURS PRN
Qty: 20 TABLET | Refills: 0 | Status: SHIPPED | OUTPATIENT
Start: 2022-01-27 | End: 2022-04-19 | Stop reason: ALTCHOICE

## 2022-01-27 RX ORDER — DEXAMETHASONE 6 MG/1
6 TABLET ORAL DAILY
Qty: 5 TABLET | Refills: 0 | Status: SHIPPED | OUTPATIENT
Start: 2022-01-27 | End: 2022-04-13

## 2022-01-27 NOTE — TELEPHONE ENCOUNTER
Patient was sent to [address] [place] [address] last night about 7:00 pm had a COVD test done and is looking for results

## 2022-01-27 NOTE — PROGRESS NOTES
Virtual Regular Visit    Verification of patient location:    Patient is located in the following state in which I hold an active license [place]      Assessment/Plan:    Problem List Items Addressed This Visit        Other    COVID-19 virus infection - Primary    Relevant Medications    ondansetron (ZOFRAN) 4 mg tablet    dexamethasone (DECADRON) 6 mg tablet    COVID-19 vaccination refused      To emergency room with any shortness of breath high fevers developed  Increase water intake  Call if not improved  Reason for visit is   Chief Complaint   Patient presents with   31 60 98     f/u + 1/26/22  still has headache, but today he had dry heaves and vomittwed 2 times  Encounter provider Dwight Mcbride DO    Provider located at [address] [place] [id number]      Recent Visits  Date Type Provider Dept   01/26/22 Dom [address]  YESSENIA Parrish [facility]   01/24/22 Telephone Erick Nielsen MD [facility]   Showing recent visits within past 7 days and meeting all other requirements  Today's Visits  Date Type Provider Dept   01/27/22 [address], DO [facility]   01/27/22 Telephone YESSENIA Singletary [facility]   Showing today's visits and meeting all other requirements  Future Appointments  No visits were found meeting these conditions  Showing future appointments within next 150 days and meeting all other requirements       The patient was identified by name and date of birth  Ana Liliaarianna Singhsilverio was informed that this is a telemedicine visit and that the visit is being conducted through [address] [place] [address] [facility] and patient was informed that this is a secure, HIPAA-compliant platform  He agrees to proceed     My office door was closed   No one else was in the room  He acknowledged consent and understanding of privacy and security of the video platform  The patient has agreed to participate and understands they can discontinue the visit at any time  Patient is aware this is a billable service  Subjective  Rashida Marie is a 67 y o  male    HPI     71-year-old gentleman on vaccinated against COVID with positive COVID test   Having symptoms of decreased taste and smell, myalgias, extreme fatigue and nausea and vomiting  No diarrhea no coughing no shortness of breath no wheezing no upper respiratory issues      Past Medical History:   Diagnosis Date    Arthritis     Atrial fibrillation ([facility] [address] )     COVID-19 01/26/2022    Diabetes 1 5, 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     Hepatitis C, acute     HTN (hypertension)     Neuropathy        Past Surgical History:   Procedure Laterality Date    CYSTOSTOMY W/ BLADDER DILATION      basket extraction of calculus    FEMUR FRACTURE SURGERY      LITHOTRIPSY      PATELLA FRACTURE SURGERY      STONE ANALYSIS (HISTORICAL)         Current Outpatient Medications   Medication Sig Dispense Refill    Ascorbic Acid, Vitamin C, (VITAMIN C) 100 MG tablet Take by mouth daily      aspirin 81 MG tablet Take 162 mg by mouth 2 (two) times a day       cholecalciferol (VITAMIN D3) 1,000 units tablet Take 2,000 Units by mouth daily       COENZYME Q10 PO Take 1 capsule by mouth daily       dexamethasone (DECADRON) 6 mg tablet Take 1 tablet (6 mg total) by mouth in the morning 5 tablet 0    diltiazem (TIAZAC) 240 MG 24 hr capsule Take 240 mg by mouth daily       Empagliflozin (Jardiance) 25 MG TABS TAKE ONE-HALF TABLET BY MOUTH DAILY **NOTE NEW STRENGTH AND DOSE**      glucose blood test strip Accu-Chek Guide test once daily      glucose blood test strip USE 1 STRIP SUBCUTANEOUSLY AS DIRECTED PROVIDER WANTS PATIENT TO TEST DAILY AND IS ADJUSTING ORAL DIABETIC MEDICATIONS      metFORMIN (GLUCOPHAGE) 1000 MG tablet Take 1,000 mg by mouth 2 (two) times a day       Multiple Vitamin (multivitamin) tablet Take 1 tablet by mouth daily        Multiple Vitamins-Minerals (AIRBORNE GUMMIES PO) Take 3 tablets by mouth daily      NON FORMULARY Red Beet Powder daily      ondansetron (ZOFRAN) 4 mg tablet Take 1 tablet (4 mg total) by mouth every 8 (eight) hours as needed for nausea or vomiting 20 tablet 0    traMADol (ULTRAM) 50 mg tablet Take 1 tablet (50 mg total) by mouth every 6 (six) hours as needed for moderate pain 120 tablet 0    vitamin B-12 (VITAMIN B-12) 1,000 mcg tablet Take 1,000 mcg by mouth daily       zinc gluconate 50 mg tablet Take by mouth daily       No current facility-administered medications for this visit  No Known Allergies    Review of Systems      Constitutional:  Denies fever or chills   Eyes:  Denies double , blurry vision or eye pain  HENT:  Denies nasal congestion or sore throat   Respiratory:  Denies cough or shortness of breath or wheezing  Cardiovascular:  Denies palpitations or chest pain  GI:  Denies  no loose stools, no reflux  Integument:  Denies rash , no open areas  Neurologic:  Denies headache or focal weakness, no dizziness  : no dysuria, or hematuria    Video Exam    Vitals:    01/27/22 1555   Temp: 99 9 °F (37 7 °C)   TempSrc: Oral   Weight: 88 kg (194 lb)   Height: 5' 8" (1 727 m)       Physical Exam     Constitutional:  Well developed, well nourished, no acute distress, non-toxic appearance   Eyes:  Is conjunctiva normal , non icteric sclera  HENT:  Atraumatic, non congested, mucous membranes are dry  Respiratory:  Nonlabored, appears comfortable, no conversational dyspnea  Cardiovascular:   no LE edema b/l  Neurologic:  no focal deficits noted   Psychiatric:  Speech and behavior appropriate , AAO x 3      I spent 5 minutes 9 seconds minutes directly with the patient during this visit    VIRTUAL VISIT [address]  verbally agrees to participate in [employer]   Pt is aware that [employer] could be limited without vital signs or the ability to perform a full hands-on physical Tyree Gómez understands he or the provider may request at any time to terminate the video visit and request the patient to seek care or treatment in person

## 2022-01-28 ENCOUNTER — NURSE TRIAGE (OUTPATIENT)
Dept: OTHER | Facility: OTHER | Age: 72
End: 2022-01-28

## 2022-01-28 ENCOUNTER — TELEPHONE (OUTPATIENT)
Dept: OTHER | Facility: HOSPITAL | Age: 72
End: 2022-01-28

## 2022-01-28 NOTE — TELEPHONE ENCOUNTER
Please call and notify patient of his results, he is positive, please see if he would like to follow up with a virtual

## 2022-01-28 NOTE — TELEPHONE ENCOUNTER
LMOM on machine for him to call back for results and to see about scheduling a follow up Tuesday or Wednesday next week

## 2022-01-29 NOTE — TELEPHONE ENCOUNTER
Regarding: covid positive- 95 temp   ----- Message from Adalgisa Frost sent at 1/28/2022  5:53 PM EST -----  " I am positive for covid and I was prescribed Decadron and zofran  Today my temperature is 95, I think its from the medication   Should I stop taking it ?"

## 2022-01-29 NOTE — TELEPHONE ENCOUNTER
Received a tiger text from answering service stating that patient was started on Decadron and Zofran yesterday for persistent COVID symptoms  Since starting Decadron and Zofran, he began to experience diarrhea  He also continue to experience low temperatures, 95 and 97 4 degrees F  I instructed him to stop decadron and to seek immediate medical attention if he continues to experience low temperatures

## 2022-01-29 NOTE — TELEPHONE ENCOUNTER
Reason for Disposition   [1] Caller has URGENT medicine question about med that PCP or specialist prescribed AND [2] triager unable to answer question    Answer Assessment - Initial Assessment Questions  Were you within 6 feet or less, for up to 15 minutes or more with a person that has a confirmed COVID-19 test?        n/a     What was the date of your exposure?          n/a     Are you experiencing any symptoms attributed to the virus?  (Assess for SOB, cough, fever, difficulty breathing)     Test Positive         Onset 1/18: Cough, Low temp 95 at 10 AM/Current Temp 97 4 at 2247  Diarrhea   Headache      HIGH RISK: Do you have any history heart or lung conditions, weakened immune system, diabetes, Asthma, CHF, HIV, COPD, Chemo, renal failure, sickle cell, etc?    Denies    Protocols used: MEDICATION QUESTION CALL-ADULT-AH, CORONAVIRUS (COVID-19) DIAGNOSED OR SUSPECTED-ADULT-AH

## 2022-01-29 NOTE — TELEPHONE ENCOUNTER
COVID Positive on 1/26  Seen virtually by PCP on 1/27  Placed ondexamethasone (DECADRON) 6 mg tablet QD in the morning and ondansetron (ZOFRAN) 4 mg tablet Q8h PRN nausea/vomiting  Patient will like like to stop taking medications  States they are causing adverse effect such as new onset diarrhea today and Oral temp of 95 0 at 10AM  Most recent temp of 97 4 (oral) at 2247  Current ongoing COVID related symptoms are Cough, headache  Patient alert and oriented x3  Able to articulate/answer appropriately during assessment  Denies any additional symptoms  Informed of risk for discontinuing medication abruptly but reports taking only one dose of DECADRON, which started today  On call provider contacted and informed of patients concerns and status  Provider recommends discontinuing decadron  Continue zofran as needed  If low temperatures persist, recommends proceeding to ED for evaluation  Patient informed of providers recommendation  Verbalized understanding and agreeable with disposition  No further questions

## 2022-01-30 ENCOUNTER — NURSE TRIAGE (OUTPATIENT)
Dept: OTHER | Facility: OTHER | Age: 72
End: 2022-01-30

## 2022-01-30 ENCOUNTER — HOSPITAL ENCOUNTER (EMERGENCY)
Facility: HOSPITAL | Age: 72
Discharge: HOME | End: 2022-01-30
Attending: EMERGENCY MEDICINE
Payer: MEDICARE

## 2022-01-30 ENCOUNTER — APPOINTMENT (EMERGENCY)
Dept: RADIOLOGY | Facility: HOSPITAL | Age: 72
End: 2022-01-30
Payer: MEDICARE

## 2022-01-30 VITALS
DIASTOLIC BLOOD PRESSURE: 91 MMHG | WEIGHT: 190 LBS | TEMPERATURE: 98.7 F | BODY MASS INDEX: 28.89 KG/M2 | HEART RATE: 74 BPM | SYSTOLIC BLOOD PRESSURE: 120 MMHG | OXYGEN SATURATION: 95 % | RESPIRATION RATE: 20 BRPM

## 2022-01-30 DIAGNOSIS — J12.82 PNEUMONIA DUE TO COVID-19 VIRUS: Primary | ICD-10-CM

## 2022-01-30 DIAGNOSIS — U07.1 PNEUMONIA DUE TO COVID-19 VIRUS: Primary | ICD-10-CM

## 2022-01-30 PROCEDURE — 99283 EMERGENCY DEPT VISIT LOW MDM: CPT

## 2022-01-30 PROCEDURE — 99284 EMERGENCY DEPT VISIT MOD MDM: CPT

## 2022-01-30 PROCEDURE — 71045 X-RAY EXAM CHEST 1 VIEW: CPT

## 2022-01-30 RX ORDER — BUDESONIDE 180 UG/1
2 AEROSOL, POWDER RESPIRATORY (INHALATION) 2 TIMES DAILY
Qty: 1 EACH | Refills: 0 | Status: SHIPPED | OUTPATIENT
Start: 2022-01-30 | End: 2022-02-02

## 2022-01-30 RX ORDER — MELATONIN
2000 DAILY
Qty: 7 TABLET | Refills: 0 | Status: SHIPPED | OUTPATIENT
Start: 2022-01-30

## 2022-01-30 NOTE — ED PROVIDER NOTES
History  Chief Complaint   Patient presents with    COVID-19 Exposure     + test 3-4 days ago, here today for bodya ches, nausea and SOB     67 y o  M with PMH of chronic afib, DM 2, osteoarthritis, Hepatitis C, neuropathy, cardiomyopathy presents to ED c/o malaise, myalgias, arthralgias, cough, fever, SOB, nausea x 10 days  Tested positive for covid-19 4 days ago  Is not vaccinated for covid-19  Reports he only took mucinex today, no antipyretics or other OTC medications  Reports he is still eating and drinking regularly  Denies PMH of asthma, COPD, previous intubation  History provided by:  Patient   used: No    Cough  Cough characteristics:  Productive  Sputum characteristics:  Yellow  Severity:  Moderate  Duration:  10 days  Timing:  Constant  Progression:  Unchanged  Chronicity:  New  Smoker: former smoker     Context comment:  + covid-19   Relieved by:  Nothing  Worsened by:  Nothing  Ineffective treatments: mucinex   Associated symptoms: chills, fever, myalgias and shortness of breath    Associated symptoms: no chest pain, no diaphoresis, no ear fullness, no ear pain, no eye discharge, no headaches, no rash, no rhinorrhea, no sinus congestion, no sore throat, no weight loss and no wheezing    Associated symptoms comment:  + nausea without vomiting  Fever:     Timing:  Intermittent    Max temp PTA:  99 9    Temp source:  Oral    Progression:  Unchanged  Myalgias:     Location:  Generalized    Quality:  Aching    Severity:  Moderate    Onset quality:  Sudden    Duration:  10 days    Timing:  Constant    Progression:  Unchanged  Shortness of breath:     Severity:  Mild    Duration:  10 days    Timing:  Intermittent    Progression:  Unchanged  Risk factors: recent infection (Positive for Covid-19 4 dayas ago)        Prior to Admission Medications   Prescriptions Last Dose Informant Patient Reported? Taking?    Ascorbic Acid, Vitamin C, (VITAMIN C) 100 MG tablet  Self Yes No   Sig: Take by mouth daily   COENZYME Q10 PO  Self Yes No   Sig: Take 1 capsule by mouth daily    Empagliflozin (Jardiance) 25 MG TABS  Self Yes No   Sig: TAKE ONE-HALF TABLET BY MOUTH DAILY **NOTE NEW STRENGTH AND DOSE**   Multiple Vitamin (multivitamin) tablet  Self Yes No   Sig: Take 1 tablet by mouth daily     Multiple Vitamins-Minerals (AIRBORNE GUMMIES PO)  Self Yes No   Sig: Take 3 tablets by mouth daily   NON FORMULARY  Self Yes No   Sig: Red Beet Powder daily   aspirin 81 MG tablet  Self Yes No   Sig: Take 162 mg by mouth 2 (two) times a day    cholecalciferol (VITAMIN D3) 1,000 units tablet  Self Yes No   Sig: Take 2,000 Units by mouth daily    dexamethasone (DECADRON) 6 mg tablet   No No   Sig: Take 1 tablet (6 mg total) by mouth in the morning   diltiazem (TIAZAC) 240 MG 24 hr capsule  Self Yes No   Sig: Take 240 mg by mouth daily    glucose blood test strip  Self Yes No   Sig: Accu-Chek Guide test once daily   glucose blood test strip  Self Yes No   Sig: USE 1 STRIP SUBCUTANEOUSLY AS DIRECTED PROVIDER WANTS PATIENT TO TEST DAILY AND IS ADJUSTING ORAL DIABETIC MEDICATIONS   metFORMIN (GLUCOPHAGE) 1000 MG tablet  Self Yes No   Sig: Take 1,000 mg by mouth 2 (two) times a day    ondansetron (ZOFRAN) 4 mg tablet   No No   Sig: Take 1 tablet (4 mg total) by mouth every 8 (eight) hours as needed for nausea or vomiting   traMADol (ULTRAM) 50 mg tablet  Self No No   Sig: Take 1 tablet (50 mg total) by mouth every 6 (six) hours as needed for moderate pain   vitamin B-12 (VITAMIN B-12) 1,000 mcg tablet  Self Yes No   Sig: Take 1,000 mcg by mouth daily    zinc gluconate 50 mg tablet  Self Yes No   Sig: Take by mouth daily      Facility-Administered Medications: None       Past Medical History:   Diagnosis Date    Arthritis     [facility]     COVID-19 01/26/2022    Diabetes 1 5, 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[facility] )     Hepatitis C, acute     HTN (hypertension)     Neuropathy        Past Surgical History: Procedure Laterality Date    CYSTOSTOMY W/ BLADDER DILATION      basket extraction of calculus    FEMUR FRACTURE SURGERY      LITHOTRIPSY      PATELLA FRACTURE SURGERY      STONE ANALYSIS (HISTORICAL)         Family History   Problem Relation Age of Onset    Dementia Mother     Pneumonia Father         Acinetobacter    Diabetes Father     Colon cancer Sister     Lung cancer Sister     Breast cancer Sister     Lung cancer Brother     Bone cancer Daughter      I have reviewed and agree with the history as documented  E-Cigarette/Vaping    E-Cigarette Use Never User      E-Cigarette/Vaping Substances    Nicotine No     THC No     CBD No     Flavoring No     Other No     Unknown No      Social History     Tobacco Use    Smoking status: Former Smoker     Packs/day: 0 50     Years: 5 00     Pack years: 2 50     Types: Cigarettes     Quit date: 1977     Years since quittin 1    Smokeless tobacco: Never Used   Vaping Use    Vaping Use: Never used   Substance Use Topics    Alcohol use: Not Currently     Comment: 1 per month    Drug use: Not Currently       Review of Systems   Constitutional: Positive for chills, fatigue and fever  Negative for appetite change, diaphoresis and weight loss  HENT: Positive for congestion  Negative for ear pain, rhinorrhea and sore throat  Eyes: Negative for pain, discharge and visual disturbance  Respiratory: Positive for cough and shortness of breath  Negative for wheezing  Cardiovascular: Negative for chest pain and palpitations  Gastrointestinal: Negative for abdominal pain, blood in stool, diarrhea, nausea and vomiting  Genitourinary: Negative for decreased urine volume, dysuria and hematuria  Musculoskeletal: Positive for arthralgias and myalgias  Negative for back pain, gait problem and neck stiffness  Skin: Negative for color change and rash     Neurological: Negative for dizziness, seizures, syncope, weakness, numbness and headaches  Psychiatric/Behavioral: Negative for confusion  All other systems reviewed and are negative  Physical Exam  Physical Exam  Vitals and nursing note reviewed  Constitutional:       General: He is awake  He is not in acute distress  Appearance: Normal appearance  He is not toxic-appearing or diaphoretic  HENT:      Head: Normocephalic and atraumatic  Jaw: No swelling  Right Ear: External ear normal       Left Ear: External ear normal       Mouth/Throat:      Lips: Pink  Mouth: Mucous membranes are moist    Eyes:      General: Lids are normal  Vision grossly intact  Right eye: No discharge  Left eye: No discharge  Conjunctiva/sclera: Conjunctivae normal       Pupils: Pupils are equal, round, and reactive to light  Cardiovascular:      Rate and Rhythm: Normal rate and regular rhythm  Heart sounds: Normal heart sounds  Pulmonary:      Effort: Pulmonary effort is normal  No respiratory distress  Breath sounds: Normal breath sounds  Comments: Patient in no respiratory distress, speaking in full sentences, managing oral secretions without difficulty, no accessory muscle use, retractions, or belly breathing noted, no adventitious lung sounds auscultated bilaterally  Abdominal:      General: There is no distension  Tenderness: There is no abdominal tenderness  Musculoskeletal:      Cervical back: Neck supple  Right lower leg: No edema  Left lower leg: No edema  Lymphadenopathy:      Cervical: No cervical adenopathy  Skin:     General: Skin is warm and dry  Capillary Refill: Capillary refill takes less than 2 seconds  Coloration: Skin is not jaundiced or pale  Findings: No rash  Neurological:      Mental Status: He is alert and oriented to person, place, and time  Motor: No weakness        Gait: Gait normal    Psychiatric:         Mood and Affect: Mood normal          Behavior: Behavior normal  Behavior is cooperative  Thought Content: Thought content normal          Vital Signs  ED Triage Vitals   Temperature Pulse Respirations Blood Pressure SpO2   01/30/22 0842 01/30/22 0842 01/30/22 0842 01/30/22 0845 01/30/22 0842   98 7 °F (37 1 °C) 75 18 120/91 96 %      Temp Source Heart Rate Source Patient Position - Orthostatic VS BP Location FiO2 (%)   01/30/22 0842 01/30/22 0842 01/30/22 0842 01/30/22 0842 --   Tympanic Monitor Sitting Left arm       Pain Score       --                  Vitals:    01/30/22 0842 01/30/22 0845 01/30/22 0945 01/30/22 1100   BP:  120/91     Pulse: 75  78 74   Patient Position - Orthostatic VS: Sitting            Visual Acuity      ED Medications  Medications - No data to display    Diagnostic Studies  Results Reviewed     None                 XR chest 1 view portable   Final Result by Rush Medellin MD (01/30 1101)      Vague groundglass opacity suspect for Covid 19 pneumonia  The study was marked in [facility] for immediate notification  Workstation performed: [id number]                    Procedures  Procedures         ED Course  ED Course as of 01/30/22 1631   Sun Jan 30, 2022   0918 Ambulatory pulse ox stays above 90%, stayed mostly at 94%, low of 91%  96% at rest                                               MDM  Number of Diagnoses or Management Options  Pneumonia due to COVID-19 virus  Diagnosis management comments: + Covid-19 test 4 days ago, symptoms x10 days  He is not vaccinated against Covid-19  Well-appearing in NAD  Afebrile  Vitals WNL  No signs of dehydration  No signs of respiratory distress  CXR ordered due to cough, report of fevers at home  Findings consistent with Covid-19 PNA, no bacterial PNA or PTX  Ambulatory and at rest pulse ox >90%  Following [facility] guidelines patient is stable for discharge  Rx for budesonide and vitamin d3 given   At home pulse ox monitor prescribed, at home monitoring and return precautions were communicated with patient who voiced understanding  All imaging and/or lab testing discussed with patient, strict return to ED precautions discussed  Patient recommended to follow up promptly with appropriate outpatient provider (PCP)  Patient and/or family members verbalizes understanding and agrees with plan  Patient and/or family members were given opportunity to ask questions, all questions were answered at this time  Patient is stable for discharge      Portions of the record may have been created with voice recognition software  Occasional wrong word or "sound a like" substitutions may have occurred due to the inherent limitations of voice recognition software  Read the chart carefully and recognize, using context, where substitutions have occurred  Amount and/or Complexity of Data Reviewed  Tests in the radiology section of CPT®: ordered and reviewed  Discuss the patient with other providers: yes (Dr Keating )        Disposition  Final diagnoses:   Pneumonia due to COVID-19 virus     Time reflects when diagnosis was documented in both MDM as applicable and the Disposition within this note     Time User Action Codes Description Comment    1/30/2022 11:18 AM Jesi Medina Add [U07 1,  J12 82] Pneumonia due to COVID-19 virus       ED Disposition     ED Disposition Condition Date/Time Comment    Discharge Stable Sun Jan 30, 2022 11:25 AM Mague Mcmanus discharge to home/self care              Follow-up Information     Follow up With Specialties Details Why [facility], MD Internal Medicine Schedule an appointment as soon as possible for a visit in 1 day For follow up regarding your symptoms [address] [place] [address]  [phone]            Discharge Medication List as of 1/30/2022 11:25 AM      START taking these medications    Details   budesonide (Pulmicort Flexhaler) 180 MCG/ACT inhaler Inhale 2 puffs 2 (two) times a day Rinse mouth after use , Starting Sun 1/30/2022, Normal      !! cholecalciferol (VITAMIN D3) 1,000 units tablet Take 2 tablets (2,000 Units total) by mouth daily, Starting Sun 1/30/2022, Normal       !! - Potential duplicate medications found  Please discuss with provider        CONTINUE these medications which have NOT CHANGED    Details   Ascorbic Acid, Vitamin C, (VITAMIN C) 100 MG tablet Take by mouth daily, Historical Med      aspirin 81 MG tablet Take 162 mg by mouth 2 (two) times a day , Historical Med      !! cholecalciferol (VITAMIN D3) 1,000 units tablet Take 2,000 Units by mouth daily , Historical Med      COENZYME Q10 PO Take 1 capsule by mouth daily , Historical Med      dexamethasone (DECADRON) 6 mg tablet Take 1 tablet (6 mg total) by mouth in the morning, Starting Thu 1/27/2022, Normal      diltiazem (TIAZAC) 240 MG 24 hr capsule Take 240 mg by mouth daily , Starting Sun 11/17/2019, Historical Med      Empagliflozin (Jardiance) 25 MG TABS TAKE ONE-HALF TABLET BY MOUTH DAILY **NOTE NEW STRENGTH AND DOSE**, Historical Med      !! glucose blood test strip Accu-Chek Guide test once daily, Historical Med      !! glucose blood test strip USE 1 STRIP SUBCUTANEOUSLY AS DIRECTED PROVIDER WANTS PATIENT TO TEST DAILY AND IS ADJUSTING ORAL DIABETIC MEDICATIONS, Historical Med      metFORMIN (GLUCOPHAGE) 1000 MG tablet Take 1,000 mg by mouth 2 (two) times a day , Starting Mon 5/20/2019, Historical Med      Multiple Vitamin (multivitamin) tablet Take 1 tablet by mouth daily  , Historical Med      Multiple Vitamins-Minerals (AIRBORNE GUMMIES PO) Take 3 tablets by mouth daily, Historical Med      NON FORMULARY Red Beet Powder daily, Historical Med      ondansetron (ZOFRAN) 4 mg tablet Take 1 tablet (4 mg total) by mouth every 8 (eight) hours as needed for nausea or vomiting, Starting Thu 1/27/2022, Normal      traMADol (ULTRAM) 50 mg tablet Take 1 tablet (50 mg total) by mouth every 6 (six) hours as needed for moderate pain, Starting Mon 12/23/2019, Normal vitamin B-12 (VITAMIN B-12) 1,000 mcg tablet Take 1,000 mcg by mouth daily , Starting Sun 11/17/2019, Historical Med      zinc gluconate 50 mg tablet Take by mouth daily, Historical Med       !! - Potential duplicate medications found  Please discuss with provider            Outpatient Discharge Orders   Pulse Oximeter       PDMP Review       Value Time User    PDMP Reviewed  Yes 12/23/2019 12:03 PM Ingris Lees MD          ED Provider  Electronically Signed by           Sai Coates PA-C  01/30/22 8813

## 2022-01-30 NOTE — TELEPHONE ENCOUNTER
Regarding: Is COVID +, /151, pulse 109, fever 100 7, cough  ----- Message from Sue De La Paz sent at 1/30/2022  7:40 AM EST -----  "My  has COVID, his fevers of 100 7, his blood pressure 169/151 and pulse is 109; he has a horrible cough and was coughing all night "

## 2022-01-30 NOTE — TELEPHONE ENCOUNTER
Reason for Disposition   [1] Fever returns after gone for over 24 hours AND [2] symptoms worse or not improved    Answer Assessment - Initial Assessment Questions  1  COVID-19 DIAGNOSIS: "Who made your COVID-19 diagnosis?" "Was it confirmed by a positive lab test?" If not diagnosed by a HCP, ask "Are there lots of cases (community spread) where you live?" Note: See [facility] health department website, if unsure  Positive lab test    2  COVID-19 EXPOSURE: "Was there any known exposure to COVID before the symptoms began?" CDC Definition of close contact: within 6 feet (2 meters) for a total of 15 minutes or more over a 24-hour period  Yes    3  ONSET: "When did the COVID-19 symptoms start?"       1/26    4  WORST SYMPTOM: "What is your worst symptom?" (e g , cough, fever, shortness of breath, muscle aches)      Cough, body aches    5  COUGH: "Do you have a cough?" If Yes, ask: "How bad is the cough?"        Cough    6  FEVER: "Do you have a fever?" If Yes, ask: "What is your temperature, how was it measured, and when did it start?"     Yes 100 7     7  RESPIRATORY STATUS: "Describe your breathing?" (e g , shortness of breath, wheezing, unable to speak)       Normal respiratory status    8  BETTER-SAME-WORSE: "Are you getting better, staying the same or getting worse compared to yesterday?"  If getting worse, ask, "In what way?"    Worse- new fever onset, coughing all night, and patient feels dehydrated  9  HIGH RISK DISEASE: "Do you have any chronic medical problems?" (e g , asthma, heart or lung disease, weak immune system, obesity, etc )      Denies    10  VACCINE: "Have you gotten the COVID-19 vaccine?" If Yes ask: "Which one, how many shots, when did you get it?"    No    11  PREGNANCY: "Is there any chance you are pregnant?" "When was your last menstrual period?"      N/a    12   OTHER SYMPTOMS: "Do you have any other symptoms?"  (e g , chills, fatigue, headache, loss of smell or taste, muscle pain, sore throat; new loss of smell or taste especially support the diagnosis of COVID-19)       Muscle pain    Protocols used: CORONAVIRUS (COVID-19) DIAGNOSED OR [facility]    Provider stated for patient to go to ED for further evaluation and treatment  Patient made aware and put on ED track board

## 2022-01-30 NOTE — DISCHARGE INSTRUCTIONS
Stay hydrated  Take tylenol as needed for fever, pains  Use inhaler as prescribed  Take vitamin d3 as prescribed  Monitor your at home pulse ox, return to the emergency department if it drops below 90% at rest or with walking  Please stay quarantined/isolation in your home  Do not interact with your family or other people in the community  You must stay isolation/quarantine for a minimum of 5 days after symptoms have begun and be symptom-free (without fever) for 24 hours before you go out of quarantine/isolation  Then follow strict mask wearing for an additional 5 days  The people you live with or people should follow CDC recommendations regarding protocols  OEMCertified uy    Take Vitamin D3 2000 units daily  18yrs and older only    Please avoid in-store purchasing of supplies and medications until your quarantine is over    Please self-prone (sleep on your stomach if possible)  Call your family doctor for follow up regarding your symptoms       Return to the ED with any worsening symptoms

## 2022-02-02 RX ORDER — PREDNISONE 20 MG/1
40 TABLET ORAL DAILY
Qty: 10 TABLET | Refills: 0 | Status: SHIPPED | OUTPATIENT
Start: 2022-02-02 | End: 2022-02-07

## 2022-02-02 RX ORDER — ALBUTEROL SULFATE 90 UG/1
2 AEROSOL, METERED RESPIRATORY (INHALATION) EVERY 6 HOURS PRN
Qty: 8 G | Refills: 0 | Status: SHIPPED | OUTPATIENT
Start: 2022-02-02 | End: 2022-02-12

## 2022-02-02 NOTE — ED RE-EVALUATION NOTE
Wife called in stating pulmicort inhaler was not covered by insurance therefore I changed script to albuterol and prednisone       Raya Srivastava PA-C  02/02/22 1417

## 2022-04-13 ENCOUNTER — OFFICE VISIT (OUTPATIENT)
Dept: INTERNAL MEDICINE CLINIC | Facility: CLINIC | Age: 72
End: 2022-04-13
Payer: MEDICARE

## 2022-04-13 ENCOUNTER — RA CDI HCC (OUTPATIENT)
Dept: OTHER | Facility: HOSPITAL | Age: 72
End: 2022-04-13

## 2022-04-13 VITALS
OXYGEN SATURATION: 97 % | TEMPERATURE: 99.3 F | HEIGHT: 68 IN | BODY MASS INDEX: 28.85 KG/M2 | WEIGHT: 190.4 LBS | DIASTOLIC BLOOD PRESSURE: 70 MMHG | HEART RATE: 103 BPM | SYSTOLIC BLOOD PRESSURE: 106 MMHG

## 2022-04-13 DIAGNOSIS — B34.9 VIRAL ILLNESS: Primary | ICD-10-CM

## 2022-04-13 DIAGNOSIS — I48.20 CHRONIC ATRIAL FIBRILLATION (HCC): ICD-10-CM

## 2022-04-13 DIAGNOSIS — Z20.828 EXPOSURE TO INFLUENZA: ICD-10-CM

## 2022-04-13 PROCEDURE — 99213 OFFICE O/P EST LOW 20 MIN: CPT | Performed by: NURSE PRACTITIONER

## 2022-04-13 PROCEDURE — 87636 SARSCOV2 & INF A&B AMP PRB: CPT | Performed by: NURSE PRACTITIONER

## 2022-04-13 RX ORDER — OSELTAMIVIR PHOSPHATE 75 MG/1
75 CAPSULE ORAL EVERY 12 HOURS SCHEDULED
Qty: 10 CAPSULE | Refills: 0 | Status: SHIPPED | OUTPATIENT
Start: 2022-04-13 | End: 2022-04-19 | Stop reason: ALTCHOICE

## 2022-04-13 NOTE — PROGRESS NOTES
Assessment/Plan:    Problem List Items Addressed This Visit        Cardiovascular and Mediastinum    Chronic atrial fibrillation ([address] )     - currently on ASA 162mg bid   - prescribed diltiazem 240mg daily  He discontinued this on his own and states he is taking "beet powder" instead  I strongly advised him to restart his diltiazem, discussed risks of uncontrolled afib and risks of stroke  Pt refused to restart diltiazem   - [facility]             Other    Viral illness - Primary     - exposure to influenza by granddaughter who lives with him  - onset of symptoms 2 days ago  - start empiric treatment with tamiflu twice daily x 5 days  - flu test sent  - recently had covid in Jan 2022         Relevant Medications    oseltamivir (TAMIFLU) 75 mg capsule    Other Relevant Orders    Covid/Flu- Office Collect Normal      Other Visit Diagnoses     Exposure to influenza        Relevant Medications    oseltamivir (TAMIFLU) 75 mg capsule    Other Relevant Orders    Covid/Flu- Office Collect Normal          M*Modal software was used to dictate this note  It may contain errors with dictating incorrect words or incorrect spelling  Please contact the provider directly with any questions  Subjective:      Patient ID: Dane Huang is a 67 y o  male  HPI     Patient presents today with cold symptoms x 2 days  He lives with his granddaughter who was diagnosed with the flu last week  He lives with her  He started with symptoms on Monday (2 days ago)  He has been taking Mucinex and benadryl for his symptoms  He did not have his flu shot this year  The following portions of the patient's history were reviewed and updated as appropriate: allergies, current medications, past family history, past medical history, past social history, past surgical history and problem list     Review of Systems   Constitutional: Positive for chills and fatigue  Negative for fever (low grade 99)     HENT: Positive for congestion, postnasal drip, rhinorrhea and sore throat  Negative for ear discharge, ear pain and sinus pressure  Respiratory: Positive for cough and wheezing  Negative for chest tightness and shortness of breath  Cardiovascular: Negative for chest pain  Gastrointestinal: Negative for diarrhea, nausea and vomiting  Musculoskeletal: Positive for myalgias  Neurological: Positive for headaches           Past Medical History:   Diagnosis Date    Arthritis     Atrial fibrillation ([place] [address] )     COVID-19 01/26/2022    Diabetes 1 5, 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[place] [address] )     Hepatitis C, acute     HTN (hypertension)     Neuropathy          Current Outpatient Medications:     Ascorbic Acid, Vitamin C, (VITAMIN C) 100 MG tablet, Take by mouth daily, Disp: , Rfl:     aspirin 81 MG tablet, Take 162 mg by mouth 2 (two) times a day , Disp: , Rfl:     cholecalciferol (VITAMIN D3) 1,000 units tablet, Take 2 tablets (2,000 Units total) by mouth daily, Disp: 7 tablet, Rfl: 0    COENZYME Q10 PO, Take 1 capsule by mouth daily , Disp: , Rfl:     Empagliflozin (Jardiance) 25 MG TABS, TAKE ONE-HALF TABLET BY MOUTH DAILY **NOTE NEW STRENGTH AND DOSE**, Disp: , Rfl:     glucose blood test strip, Accu-Chek Guide test once daily, Disp: , Rfl:     glucose blood test strip, USE 1 STRIP SUBCUTANEOUSLY AS DIRECTED PROVIDER WANTS PATIENT TO TEST DAILY AND IS ADJUSTING ORAL DIABETIC MEDICATIONS, Disp: , Rfl:     Multiple Vitamin (multivitamin) tablet, Take 1 tablet by mouth daily  , Disp: , Rfl:     Multiple Vitamins-Minerals (AIRBORNE GUMMIES PO), Take 3 tablets by mouth daily, Disp: , Rfl:     NON FORMULARY, Red Beet Powder daily, Disp: , Rfl:     vitamin B-12 (VITAMIN B-12) 1,000 mcg tablet, Take 1,000 mcg by mouth daily , Disp: , Rfl:     zinc gluconate 50 mg tablet, Take by mouth daily, Disp: , Rfl:     cholecalciferol (VITAMIN D3) 1,000 units tablet, Take 2,000 Units by mouth daily  (Patient not taking: Reported on 4/13/2022 ), Disp: , Rfl:     diltiazem (TIAZAC) 240 MG 24 hr capsule, Take 240 mg by mouth daily  (Patient not taking: Reported on 4/13/2022 ), Disp: , Rfl:     metFORMIN (GLUCOPHAGE) 1000 MG tablet, Take 1,000 mg by mouth 2 (two) times a day  (Patient not taking: Reported on 4/13/2022 ), Disp: , Rfl:     ondansetron (ZOFRAN) 4 mg tablet, Take 1 tablet (4 mg total) by mouth every 8 (eight) hours as needed for nausea or vomiting (Patient not taking: Reported on 4/13/2022 ), Disp: 20 tablet, Rfl: 0    oseltamivir (TAMIFLU) 75 mg capsule, Take 1 capsule (75 mg total) by mouth every 12 (twelve) hours for 5 days, Disp: 10 capsule, Rfl: 0    traMADol (ULTRAM) 50 mg tablet, Take 1 tablet (50 mg total) by mouth every 6 (six) hours as needed for moderate pain (Patient not taking: Reported on 4/13/2022 ), Disp: 120 tablet, Rfl: 0    No Known Allergies    Social History   Past Surgical History:   Procedure Laterality Date    CYSTOSTOMY W/ BLADDER DILATION      basket extraction of calculus    FEMUR FRACTURE SURGERY      LITHOTRIPSY      PATELLA FRACTURE SURGERY      STONE ANALYSIS (HISTORICAL)       Family History   Problem Relation Age of Onset    Dementia Mother     Pneumonia Father         Acinetobacter    Diabetes Father     Colon cancer Sister     Lung cancer Sister     Breast cancer Sister     Lung cancer Brother     Bone cancer Daughter        Objective:  /70 (BP Location: Left arm, Patient Position: Sitting, Cuff Size: Standard)   Pulse 103   Temp 99 3 °F (37 4 °C) (Tympanic)   Ht 5' 8 11" (1 73 m)   Wt 86 4 kg (190 lb 6 4 oz)   SpO2 97% Comment: room air  BMI 28 86 kg/m²      Physical Exam  Constitutional:       General: He is not in acute distress  Appearance: Normal appearance  He is not diaphoretic  HENT:      Head: Normocephalic and atraumatic  Right Ear: Tympanic membrane and external ear normal       Left Ear: Tympanic membrane and external ear normal       Nose: Rhinorrhea present  Mouth/Throat:      Mouth: Mucous membranes are moist       Pharynx: Oropharynx is clear  No oropharyngeal exudate or posterior oropharyngeal erythema  Eyes:      Extraocular Movements: Extraocular movements intact  Conjunctiva/sclera: Conjunctivae normal       Pupils: Pupils are equal, round, and reactive to light  Cardiovascular:      Rate and Rhythm: Normal rate  Rhythm irregularly irregular  Pulmonary:      Effort: Pulmonary effort is normal  No respiratory distress  Breath sounds: Normal breath sounds  No wheezing, rhonchi or rales  Comments: Dry cough  Neurological:      Mental Status: He is alert  Mental status is at baseline     Psychiatric:         Mood and Affect: Mood normal          Behavior: Behavior normal

## 2022-04-13 NOTE — PATIENT INSTRUCTIONS
Start Mucinex DM   Start tamiflu twice daily for 5 days   Isolate for 72 hours, and 24 hours from last fever     Follow up for any worsening symptoms

## 2022-04-13 NOTE — ASSESSMENT & PLAN NOTE
- currently on ASA 162mg bid   - prescribed diltiazem 240mg daily  He discontinued this on his own and states he is taking "beet powder" instead  I strongly advised him to restart his diltiazem, discussed risks of uncontrolled afib and risks of stroke   Pt refused to restart diltiazem   - follows with cardiology with the [facility]

## 2022-04-13 NOTE — ASSESSMENT & PLAN NOTE
- exposure to influenza by granddaughter who lives with him  - onset of symptoms 2 days ago  - start empiric treatment with tamiflu twice daily x 5 days  - flu test sent  - recently had covid in Jan 2022

## 2022-04-13 NOTE — PROGRESS NOTES
Diabetic Foot Exam    Patient's shoes and socks removed  Right Foot/Ankle   Right Foot Inspection  Skin Exam: skin normal and skin intact  No dry skin, no warmth, no callus, no erythema, no maceration, no abnormal color, no pre-ulcer, no ulcer and no callus  Toe Exam: ROM and strength within normal limits  Sensory   Vibration: intact  Monofilament testing: intact    Vascular  Capillary refills: < 3 seconds  The right DP pulse is 2+  The right PT pulse is 2+  Left Foot/Ankle  Left Foot Inspection  Skin Exam: skin normal and skin intact  No dry skin, no warmth, no erythema, no maceration, normal color, no pre-ulcer, no ulcer and no callus  Toe Exam: ROM and strength within normal limits  Sensory   Vibration: intact  Monofilament testing: intact    Vascular  Capillary refills: < 3 seconds  The left DP pulse is 2+  The left PT pulse is 2+       Assign Risk Category  No deformity present  No loss of protective sensation  No weak pulses  Risk: 0

## 2022-04-13 NOTE — PROGRESS NOTES
[address]  coding opportunities     [address]     Chart Reviewed number of suggestions sent to Provider: 2     Patients Insurance     Medicare Insurance: Medicare

## 2022-04-14 LAB
FLUAV RNA RESP QL NAA+PROBE: POSITIVE
FLUBV RNA RESP QL NAA+PROBE: NEGATIVE
SARS-COV-2 RNA RESP QL NAA+PROBE: NEGATIVE

## 2022-04-19 ENCOUNTER — OFFICE VISIT (OUTPATIENT)
Dept: INTERNAL MEDICINE CLINIC | Facility: CLINIC | Age: 72
End: 2022-04-19
Payer: MEDICARE

## 2022-04-19 VITALS
WEIGHT: 194.6 LBS | HEART RATE: 97 BPM | SYSTOLIC BLOOD PRESSURE: 104 MMHG | BODY MASS INDEX: 29.49 KG/M2 | TEMPERATURE: 98 F | DIASTOLIC BLOOD PRESSURE: 68 MMHG | OXYGEN SATURATION: 99 % | HEIGHT: 68 IN

## 2022-04-19 DIAGNOSIS — I48.20 CHRONIC ATRIAL FIBRILLATION (HCC): ICD-10-CM

## 2022-04-19 DIAGNOSIS — Z12.12 ENCOUNTER FOR COLORECTAL CANCER SCREENING: Primary | ICD-10-CM

## 2022-04-19 DIAGNOSIS — E11.40 TYPE 2 DIABETES MELLITUS WITH DIABETIC NEUROPATHY, WITHOUT LONG-TERM CURRENT USE OF INSULIN (HCC): ICD-10-CM

## 2022-04-19 DIAGNOSIS — I10 ESSENTIAL HYPERTENSION: ICD-10-CM

## 2022-04-19 DIAGNOSIS — U07.1 COVID-19 VIRUS INFECTION: ICD-10-CM

## 2022-04-19 DIAGNOSIS — Z12.11 ENCOUNTER FOR COLORECTAL CANCER SCREENING: Primary | ICD-10-CM

## 2022-04-19 PROBLEM — B34.9 VIRAL ILLNESS: Status: RESOLVED | Noted: 2021-06-16 | Resolved: 2022-04-19

## 2022-04-19 PROCEDURE — 99214 OFFICE O/P EST MOD 30 MIN: CPT | Performed by: INTERNAL MEDICINE

## 2022-04-19 NOTE — ASSESSMENT & PLAN NOTE
Has been taking red beet powder (nitric oxide) and blood pressure has been well controlled  Stopped diltiazem

## 2022-04-19 NOTE — ASSESSMENT & PLAN NOTE
Residua of Covid pneumonia; persistent cough, fatigue; encouraged patient to increase physical activity  Continue use of dietary and immune supplements such as Vitamin D, C, selenium   Continue use of Mucinex 600mg tabs, 2 tabs BID

## 2022-04-19 NOTE — PROGRESS NOTES
Assessment/Plan:    Essential hypertension  Has been taking red beet powder (nitric oxide) and blood pressure has been well controlled  Stopped diltiazem  COVID-19 virus infection  Residua of Covid pneumonia; persistent cough, fatigue; encouraged patient to increase physical activity  Continue use of dietary and immune supplements such as Vitamin D, C, selenium  Continue use of Mucinex 600mg tabs, 2 tabs BID      Type 2 diabetes mellitus with neurologic complication, without long-term current use of insulin (HCC)    Lab Results   Component Value Date    HGBA1C 6 7 (A) 10/12/2021   Has been working with diet to control his glucose  Stopped metformin on his own  Given prescriptions for follow-up labs and follow-up A1c in August    Chronic atrial fibrillation ([facility] )  Rate controlled  Only takes aspirin since having a bleeding complication with warfarin         Diagnoses and all orders for this visit:    Encounter for colorectal cancer screening    COVID-19 virus infection    Chronic atrial fibrillation ([facility] )    Essential hypertension  -     CBC; Future  -     Comprehensive metabolic panel; Future  -     Lipid panel; Future  -     Hemoglobin A1C; Future  -     Microalbumin / creatinine urine ratio    Type 2 diabetes mellitus with diabetic neuropathy, without long-term current use of insulin (HCC)  -     CBC; Future  -     Comprehensive metabolic panel; Future  -     Lipid panel; Future  -     Hemoglobin A1C; Future  -     Microalbumin / creatinine urine ratio          Subjective:      Patient ID: Gissel Sylvester is a 67 y o  male  Patient presents to the office for follow-up visit  He was recently diagnosed and treated for COVID pneumonia in late January  He was not hospitalized  He recovered uneventfully but still has some significant chest congestion coughing to this day  He was recently diagnosed with influenza A  He had not been vaccinated for influenza or for COVID    He was treated with Tamiflu and has made a partial recovery  He continues to feel quite fatigued but he denies feeling short of breath  He sleeps well at night  He denies any fevers, chills, diaphoresis, nausea or vomiting  Appetite is good  He believes he got the flu from his grandchildren who became ill while at school  One of them was diagnosed as having influenza A  His most recent A1c was 6 7% in October of last year  He has been monitoring his diet carefully  His only complaint at this time is that of persistent fatigue since his COVID infection and some moderate upper respiratory congestion and cough which is minimally productive since his recent diagnosis of influenza A one week ago  His oxygenation currently is 99% on room air  He is not ill appearing        Family History   Problem Relation Age of Onset    Dementia Mother     Pneumonia Father         Acinetobacter    Diabetes Father     Colon cancer Sister     Lung cancer Sister     Breast cancer Sister     Lung cancer Brother     Bone cancer Daughter      Social History     Socioeconomic History    Marital status: /Civil Union     Spouse name: Not on file    Number of children: Not on file    Years of education: Not on file    Highest education level: Not on file   Occupational History    Not on file   Tobacco Use    Smoking status: Former Smoker     Packs/day: 0 50     Years: 5 00     Pack years: 2 50     Types: Cigarettes     Quit date: 1977     Years since quittin 3    Smokeless tobacco: Never Used   Vaping Use    Vaping Use: Never used   Substance and Sexual Activity    Alcohol use: Not Currently     Comment: 1 per month    Drug use: Not Currently    Sexual activity: Not on file   Other Topics Concern    Not on file   Social History Narrative    Not on file     Social Determinants of Health     Financial Resource Strain: Not on file   Food Insecurity: Not on file   Transportation Needs: Not on file   Physical Activity: Not on file   Stress: Not on file   Social Connections: Not on file   Intimate Partner Violence: Not on file   Housing Stability: Not on file     Past Medical History:   Diagnosis Date    Arthritis     At[facility] fibrillation ([facility] )     COVID-19 01/26/2022    Diabetes 1 5, 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[facility] )     Hepatitis C, acute     HTN (hypertension)     Influenza     Neuropathy     Viral illness 6/16/2021       Current Outpatient Medications:     Ascorbic Acid, Vitamin C, (VITAMIN C) 100 MG tablet, Take by mouth daily, Disp: , Rfl:     aspirin 81 MG tablet, Take 162 mg by mouth 2 (two) times a day , Disp: , Rfl:     cholecalciferol (VITAMIN D3) 1,000 units tablet, Take 2 tablets (2,000 Units total) by mouth daily, Disp: 7 tablet, Rfl: 0    COENZYME Q10 PO, Take 1 capsule by mouth daily , Disp: , Rfl:     Empagliflozin (Jardiance) 25 MG TABS, TAKE ONE-HALF TABLET BY MOUTH DAILY **NOTE NEW STRENGTH AND DOSE**, Disp: , Rfl:     glucose blood test strip, Accu-Chek Guide test once daily, Disp: , Rfl:     Multiple Vitamins-Minerals (AIRBORNE GUMMIES PO), Take 3 tablets by mouth daily, Disp: , Rfl:     NON FORMULARY, Red Beet Powder daily, Disp: , Rfl:     vitamin B-12 (VITAMIN B-12) 1,000 mcg tablet, Take 1,000 mcg by mouth daily , Disp: , Rfl:     zinc gluconate 50 mg tablet, Take by mouth daily, Disp: , Rfl:     traMADol (ULTRAM) 50 mg tablet, Take 1 tablet (50 mg total) by mouth every 6 (six) hours as needed for moderate pain (Patient not taking: Reported on 4/13/2022 ), Disp: 120 tablet, Rfl: 0  No Known Allergies  Past Surgical History:   Procedure Laterality Date    CYSTOSTOMY W/ BLADDER DILATION      basket extraction of calculus    FEMUR FRACTURE SURGERY      LITHOTRIPSY      PATELLA FRACTURE SURGERY      STONE ANALYSIS (HISTORICAL)           Review of Systems   Constitutional: Positive for activity change and fatigue   Negative for appetite change (Lately has been sedentary), chills, diaphoresis, fever and unexpected weight change  HENT: Positive for congestion and rhinorrhea  Eyes: Negative  Respiratory: Positive for cough and shortness of breath  Negative for apnea, choking, chest tightness, wheezing and stridor  Cardiovascular: Negative for chest pain, palpitations and leg swelling  Gastrointestinal: Negative  Endocrine: Negative  Genitourinary: Negative  Musculoskeletal: Positive for arthralgias (Bilateral ankles) and myalgias  Allergic/Immunologic: Negative  Neurological: Negative  Hematological: Negative  Psychiatric/Behavioral: Negative  Objective:      /68 (BP Location: Left arm, Patient Position: Sitting, Cuff Size: Standard)   Pulse 97   Temp 98 °F (36 7 °C) (Tympanic)   Ht 5' 7 72" (1 72 m)   Wt 88 3 kg (194 lb 9 6 oz)   SpO2 99%   BMI 29 84 kg/m²          Physical Exam  Vitals reviewed  Constitutional:       General: He is not in acute distress  Appearance: He is normal weight  He is not ill-appearing, toxic-appearing or diaphoretic  HENT:      Head: Normocephalic and atraumatic  Right Ear: Tympanic membrane normal       Left Ear: Tympanic membrane normal    Eyes:      General: No scleral icterus  Conjunctiva/sclera: Conjunctivae normal       Pupils: Pupils are equal, round, and reactive to light  Neck:      Vascular: No JVD  Trachea: No tracheal deviation  Cardiovascular:      Rate and Rhythm: Normal rate  Rhythm irregular  Pulses: Normal pulses  Heart sounds: Normal heart sounds  No murmur heard  Pulmonary:      Effort: Pulmonary effort is normal  No respiratory distress  Breath sounds: No stridor  Rhonchi (Anterior rhonchi bilaterally) present  No wheezing or rales  Chest:      Chest wall: No tenderness  Abdominal:      General: Abdomen is flat  There is no distension  Tenderness: There is no abdominal tenderness  Musculoskeletal:         General: Deformity (Bilateral ankles) present        Cervical back: Neck supple  Right lower leg: No edema  Left lower leg: No edema  Skin:     General: Skin is warm  Coloration: Skin is not jaundiced  Findings: No bruising or erythema  Neurological:      General: No focal deficit present  Mental Status: He is alert and oriented to person, place, and time  Mental status is at baseline     Psychiatric:         Mood and Affect: Mood normal          Behavior: Behavior normal

## 2022-04-19 NOTE — ASSESSMENT & PLAN NOTE
Lab Results   Component Value Date    HGBA1C 6 7 (A) 10/12/2021   Has been working with diet to control his glucose  Stopped metformin on his own    Given prescriptions for follow-up labs and follow-up A1c in August

## 2022-04-19 NOTE — TELEPHONE ENCOUNTER
No need to reschedule missed Pre-op appointment  Patient cancelled surgery due to illness, not currently rescheduled yet

## 2022-04-20 ENCOUNTER — TELEPHONE (OUTPATIENT)
Dept: ADMINISTRATIVE | Facility: OTHER | Age: 72
End: 2022-04-20

## 2022-04-20 NOTE — TELEPHONE ENCOUNTER
Upon review of the In Basket request and the patient's chart, initial outreach has been made via fax, please see Contacts section for details       Thank you  Louisa Maldonado

## 2022-04-20 NOTE — LETTER
Lab Result(s) Request Form: Cologuard      Date Requested: 22  Patient: Shermon Search  Patient : 1950   Referring Provider: Stephen Amaya, MD        Date of Lab Collection ______________________________       The above patient has informed us that they have completed their   most recent Cologuard at your facility  Please complete   this form and attach all corresponding procedure reports/results  Comments __________________________________________________________  ____________________________________________________________________  ____________________________________________________________________  ____________________________________________________________________    Collecting/Resulting Facility  ___________________________________________  Form Completed By (print name) ________________________________________    Signature ___________________________________________________________      These reports are needed for  compliance  Please fax this completed form and a copy of the lab result(s)/ report(s) to our office located at [place] [address] as soon as possible to [phone] jomar Licona: Phone [phone]    We thank you for your assistance in treating our mutual patient

## 2022-04-20 NOTE — TELEPHONE ENCOUNTER
----- Message from Belinda Bojorquez sent at 4/19/2022 12:31 PM EDT -----  Regarding: cassi - [facility]  04/19/22 12:32 PM    Hello, our patient Mariza Huang has had CRC: Cassi completed/performed  Please assist in updating the patient chart by making an External outreach to [facility] facility located in [facility]  The date of service is 2021 or 2022      Thank you,  Belinda Bojorquez  [facility]

## 2022-10-12 PROBLEM — J06.9 VIRAL UPPER RESPIRATORY TRACT INFECTION: Status: RESOLVED | Noted: 2021-06-16 | Resolved: 2022-10-12

## 2022-12-01 ENCOUNTER — RA CDI HCC (OUTPATIENT)
Dept: OTHER | Facility: HOSPITAL | Age: 72
End: 2022-12-01

## 2022-12-01 NOTE — PROGRESS NOTES
[address]  coding opportunities     [address]     Chart Reviewed number of suggestions sent to Provider: 2     Patients Insurance     Medicare Insurance: Medicare

## 2022-12-07 ENCOUNTER — OFFICE VISIT (OUTPATIENT)
Dept: INTERNAL MEDICINE CLINIC | Facility: CLINIC | Age: 72
End: 2022-12-07

## 2022-12-07 VITALS
HEART RATE: 108 BPM | DIASTOLIC BLOOD PRESSURE: 62 MMHG | WEIGHT: 197 LBS | OXYGEN SATURATION: 95 % | SYSTOLIC BLOOD PRESSURE: 98 MMHG | HEIGHT: 68 IN | TEMPERATURE: 98.2 F | BODY MASS INDEX: 29.86 KG/M2

## 2022-12-07 DIAGNOSIS — Z00.00 MEDICARE ANNUAL WELLNESS VISIT, SUBSEQUENT: ICD-10-CM

## 2022-12-07 DIAGNOSIS — I10 ESSENTIAL HYPERTENSION: ICD-10-CM

## 2022-12-07 DIAGNOSIS — I48.20 CHRONIC ATRIAL FIBRILLATION (HCC): ICD-10-CM

## 2022-12-07 DIAGNOSIS — E11.40 TYPE 2 DIABETES MELLITUS WITH DIABETIC NEUROPATHY, WITHOUT LONG-TERM CURRENT USE OF INSULIN (HCC): Primary | ICD-10-CM

## 2022-12-07 DIAGNOSIS — E27.8 ADRENAL NODULE (HCC): ICD-10-CM

## 2022-12-07 LAB — SL AMB POCT HEMOGLOBIN AIC: 8 (ref ?–6.5)

## 2022-12-07 NOTE — PROGRESS NOTES
Assessment and Plan:     Problem List Items Addressed This Visit        Endocrine    Type 2 diabetes mellitus with neurologic complication, without long-term current use of insulin ([facility] [address] ) - Primary       Lab Results   Component Value Date    HGBA1C 8 0 (A) 12/07/2022   Not being careful with diet  hgb A1C as noted  Stopped metformin  Encouraged to resume  Relevant Orders    POCT hemoglobin A1c (Completed)       Cardiovascular and Mediastinum    Essential hypertension     Patient is normotensive  Will d/c diagnosis         Chronic atrial fibrillation (HCC)     Refuses anticoagulation  Other    Adrenal nodule ([facility] [address] )    Relevant Orders    CT abdomen pelvis wo contrast    Medicare annual wellness visit, subsequent     Patient is up-to-date with several age-appropriate immunizations  He does not wish to receive the influenza vaccine he has not received any COVID vaccinations  He has had COVID and survived  He is up-to-date with age-appropriate screenings as well             Preventive health issues were discussed with patient, and age appropriate screening tests were ordered as noted in patient's After Visit Summary  Personalized health advice and appropriate referrals for health education or preventive services given if needed, as noted in patient's After Visit Summary  History of Present Illness:     Patient presents for a Medicare Wellness Visit    Patient presents to the office for an annual Medicare wellness visit along with a follow-up visit for his medical issues  He has a history of chronic atrial fibrillation  He is not on any rate limiting medication nor is he on any anticoagulation  He has a history of type 2 diabetes he had previously been prescribed metformin and Jardiance  He believes the Lennox Mario is causing excessive urination leading to some urinary incontinence  He has reduced his dose to 1/2 a tablet his hemoglobin A1c was tested point care and was found to be 8%  Patient admits to being noncompliant with the diabetic diet and would like to be a bit stricture with his diet before he goes back on any medication  He has a history of hypertension but he is not taking any hypertensive medications  His current blood pressure reading is 98/62  He attributes this to the use of red beet powder  Denies any issues with chest pain, dyspnea, orthopnea, palpitations, dyspnea on exertion, PND, complains of some pain in his right lower back especially when sitting down  The review of his x-rays reveals chronic appearing deformity of the peripheral right superior iliac wing with multifocal heterotopic ossification adjacent to the right iliac wing, acetabulum, and proximal femur  There is also mild degenerative osteoarthritis of both hips  He has had issues with kidney stones in the past but none recently  He notes an occasional twinge of flank discomfort but nothing that lasts more than a few moments  CT of his abdomen previously demonstrated 1 2 cm left adrenal nodule and a follow-up noncontrast abdominal CT was recommended after year  This has been ordered  Patient Care Team:  Sherie Damico MD as PCP - General (Internal Medicine)     Review of Systems:     Review of Systems   Constitutional: Negative  HENT: Negative  Eyes: Negative  Respiratory: Negative  Cardiovascular: Negative  Gastrointestinal: Negative  Endocrine: Negative  Genitourinary: Negative  Musculoskeletal: Positive for arthralgias (Hips), back pain (Right-sided pelvic pain and sacroiliac discomfort) and gait problem (Limb length inequality  Patient wears corrective shoes)  Skin: Negative  Allergic/Immunologic: Negative  Hematological: Negative  Psychiatric/Behavioral: Negative           Problem List:     Patient Active Problem List   Diagnosis   • Right hip pain   • Chronic pain   • Coronary artery disease (CAD) excluded   • Essential hypertension   • Osteoarthrosis • Chronic atrial fibrillation (HCC)   • PTSD (post-traumatic stress disorder)   • Type 2 diabetes mellitus with neurologic complication, without long-term current use of insulin (HCC)   • Tachycardia induced cardiomyopathy (HCC)   • Scoliosis   • Chronic right sacroiliac joint pain   • Microscopic hematuria   • Hepatitis C virus infection resolved after antiviral drug therapy   • Chronic midline low back pain without sciatica   • Bilateral nephrolithiasis   • Adrenal nodule (HCC)   • Callus of foot   • Medicare annual wellness visit, subsequent   • Benign prostatic hyperplasia with nocturia   • Continuous opioid dependence ([facility] )   • Tick bite of left wrist   • Intractable headache   • COVID-19 virus infection   • COVID-19 vaccination refused      Past Medical and Surgical History:     Past Medical History:   Diagnosis Date   • Arthritis    • Atrial fibrillation ([facility] )    • COVID-19 01/26/2022   • Diabetes 1 5, 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[facility] )    • Hepatitis C, acute    • HTN (hypertension)    • Influenza    • Neuropathy    • Pneumonia 01/26/2022   • Viral illness 06/16/2021     Past Surgical History:   Procedure Laterality Date   • CYSTOSTOMY W/ BLADDER DILATION      basket extraction of calculus   • FEMUR FRACTURE SURGERY     • LITHOTRIPSY     • PATELLA FRACTURE SURGERY     • STONE ANALYSIS (HISTORICAL)        Family History:     Family History   Problem Relation Age of Onset   • Dementia Mother    • Pneumonia Father         Acinetobacter   • Diabetes Father    • Colon cancer Sister    • Lung cancer Sister    • Breast cancer Sister    • Lung cancer Brother    • Bone cancer Daughter       Social History:     Social History     Socioeconomic History   • Marital status: /Civil Union     Spouse name: None   • Number of children: None   • Years of education: None   • Highest education level: None   Occupational History   • None   Tobacco Use   • Smoking status: Former     Packs/day: 0 50     Years: 10 00     Pack years: 5 00     Types: Cigarettes     Start date: 5     Quit date: 1977     Years since quittin 9   • Smokeless tobacco: Never   Vaping Use   • Vaping Use: Never used   Substance and Sexual Activity   • Alcohol use: Not Currently     Comment: 1 per month   • Drug use: Not Currently   • Sexual activity: None   Other Topics Concern   • None   Social History Narrative   • None     Social Determinants of Health     Financial Resource Strain: Low Risk    • Difficulty of Paying Living Expenses: Not hard at all   Food Insecurity: Not on file   Transportation Needs: No Transportation Needs   • Lack of Transportation (Medical): No   • Lack of Transportation (Non-Medical): No   Physical Activity: Not on file   Stress: Not on file   Social Connections: Not on file   Intimate Partner Violence: Not on file   Housing Stability: Not on file      Medications and Allergies:     Current Outpatient Medications   Medication Sig Dispense Refill   • Ascorbic Acid, Vitamin C, (VITAMIN C) 100 MG tablet Take by mouth daily     • aspirin 81 MG tablet Take 162 mg by mouth 2 (two) times a day      • cholecalciferol (VITAMIN D3) 1,000 units tablet Take 2 tablets (2,000 Units total) by mouth daily 7 tablet 0   • COENZYME Q10 PO Take 1 capsule by mouth daily      • Empagliflozin (Jardiance) 25 MG TABS TAKE ONE-HALF TABLET BY MOUTH DAILY **NOTE NEW STRENGTH AND DOSE**     • glucose blood test strip Accu-Chek Guide test once daily     • NON FORMULARY Red Beet Powder daily     • vitamin B-12 (VITAMIN B-12) 1,000 mcg tablet Take 1,000 mcg by mouth daily      • ZINC GLUCONATE PO Take 15 mg by mouth daily     • Multiple Vitamins-Minerals (AIRBORNE GUMMIES PO) Take 3 tablets by mouth daily     • traMADol (ULTRAM) 50 mg tablet Take 1 tablet (50 mg total) by mouth every 6 (six) hours as needed for moderate pain 120 tablet 0     No current facility-administered medications for this visit       No Known Allergies   Immunizations:     Immunization History   Administered Date(s) Administered   • Hep B, adult 11/18/2015, 12/16/2015, 06/02/2016   • Pneumococcal Conjugate 13-Valent 09/03/2015, 04/15/2019   • Pneumococcal Polysaccharide PPV23 04/15/2016   • Td (adult), Unspecified 01/01/2010   • Tdap 12/21/2021   • Zoster 02/16/2016   • Zoster Vaccine Recombinant 09/03/2019, 12/08/2019      Health Maintenance:         Topic Date Due   • Colorectal Cancer Screening  05/03/2025   • Hepatitis C Screening  Completed         Topic Date Due   • COVID-19 Vaccine (1) Never done   • Influenza Vaccine (1) Never done      Medicare Screening Tests and Risk Assessments:     Yulia Nieto is here for his Subsequent Wellness visit  Health Risk Assessment:   Patient rates overall health as good  Patient feels that their physical health rating is same  Patient is satisfied with their life  Eyesight was rated as slightly worse  Hearing was rated as slightly worse  Patient feels that their emotional and mental health rating is same  Patients states they are never, rarely angry  Patient states they are sometimes unusually tired/fatigued  Pain experienced in the last 7 days has been some  Patient's pain rating has been 6/10  Patient states that he has experienced no weight loss or gain in last 6 months  Depression Screening:   PHQ-2 Score: 1      Fall Risk Screening: In the past year, patient has experienced: no history of falling in past year      Home Safety:  Patient does not have trouble with stairs inside or outside of their home  Patient has working smoke alarms and has working carbon monoxide detector  Home safety hazards include: none  Nutrition:   Current diet is Diabetic, Low Carb and Limited junk food  Medications:   Patient is currently taking over-the-counter supplements  OTC medications include: see medication list  Patient is able to manage medications       Activities of Daily Living (ADLs)/Instrumental Activities of Daily Living (IADLs):   Walk and transfer into and out of bed and chair?: Yes  Dress and groom yourself?: Yes    Bathe or shower yourself?: Yes    Feed yourself? Yes  Do your laundry/housekeeping?: Yes  Manage your money, pay your bills and track your expenses?: Yes  Make your own meals?: Yes    Do your own shopping?: Yes    Durable Medical Equipment Suppliers  no    Previous Hospitalizations:   Any hospitalizations or ED visits within the last 12 months?: Yes    How many hospitalizations have you had in the last year?: 1-2    Advance Care Planning:   Living will: No    Durable POA for healthcare: No    Advanced directive: No    Advanced directive counseling given: Yes    Five wishes given: No    Patient declined ACP directive: Yes    End of Life Decisions reviewed with patient: Yes    Provider agrees with end of life decisions: Yes      Cognitive Screening:   Provider or family/friend/caregiver concerned regarding cognition?: No    PREVENTIVE SCREENINGS      Cardiovascular Screening:    General: Screening Current      Diabetes Screening:     General: Screening Not Indicated, History Diabetes and Screening Current      Colorectal Cancer Screening:     General: Screening Current      Prostate Cancer Screening:    General: Screening Current      Osteoporosis Screening:    General: Screening Not Indicated      Abdominal Aortic Aneurysm (AAA) Screening:    Risk factors include: age between 73-69 yo and tobacco use        General: Screening Current      Lung Cancer Screening:     General: Screening Not Indicated      Hepatitis C Screening:    General: Screening Not Indicated and History Hepatitis C    Screening, Brief Intervention, and Referral to Treatment (SBIRT)    Screening  Typical number of drinks in a day: 0  Typical number of drinks in a week: 1  Interpretation: Low risk drinking behavior  Single Item Drug Screening:  How often have you used an illegal drug (including marijuana) or a prescription medication for non-medical reasons in the past year? never    Single Item Drug Screen Score: 0  Interpretation: Negative screen for possible drug use disorder    Referral to Treatment  Patient referred to treatment due to their substance use disorder  No results found  Physical Exam:     BP 98/62 (BP Location: Left arm, Patient Position: Sitting, Cuff Size: Standard)   Pulse (!) 108   Temp 98 2 °F (36 8 °C) (Tympanic)   Ht 5' 7 72" (1 72 m)   Wt 89 4 kg (197 lb)   SpO2 95%   BMI 30 20 kg/m²     Physical Exam  Vitals reviewed  Constitutional:       General: He is not in acute distress  Appearance: Normal appearance  He is not ill-appearing, toxic-appearing or diaphoretic  HENT:      Head: Normocephalic and atraumatic  Right Ear: Tympanic membrane and external ear normal  There is no impacted cerumen  Left Ear: Tympanic membrane and external ear normal  There is no impacted cerumen  Nose: No congestion or rhinorrhea  Mouth/Throat:      Mouth: Mucous membranes are moist       Pharynx: Oropharynx is clear  Eyes:      General: No scleral icterus  Conjunctiva/sclera: Conjunctivae normal       Pupils: Pupils are equal, round, and reactive to light  Cardiovascular:      Rate and Rhythm: Tachycardia present  Rhythm irregular  Heart sounds: Normal heart sounds  No murmur heard  Pulmonary:      Effort: Pulmonary effort is normal  No respiratory distress  Breath sounds: Normal breath sounds  Abdominal:      General: Abdomen is flat  There is no distension  Tenderness: There is no abdominal tenderness  Musculoskeletal:         General: Signs of injury (Limb length inequality  Patient wears high correcting shoes of the right foot) present  No swelling, tenderness or deformity  Cervical back: Neck supple  Right lower leg: No edema  Left lower leg: No edema  Lymphadenopathy:      Cervical: No cervical adenopathy  Skin:     General: Skin is warm        Capillary Refill: Capillary refill takes less than 2 seconds  Coloration: Skin is not jaundiced  Findings: No bruising, erythema or rash  Neurological:      General: No focal deficit present  Mental Status: He is alert and oriented to person, place, and time  Mental status is at baseline     Psychiatric:         Mood and Affect: Mood normal          Behavior: Behavior normal           Janes Winter MD

## 2022-12-07 NOTE — ASSESSMENT & PLAN NOTE
Patient is up-to-date with several age-appropriate immunizations  He does not wish to receive the influenza vaccine he has not received any COVID vaccinations  He has had COVID and survived    He is up-to-date with age-appropriate screenings as well

## 2022-12-07 NOTE — ASSESSMENT & PLAN NOTE
Lab Results   Component Value Date    HGBA1C 8 0 (A) 12/07/2022   Not being careful with diet  hgb A1C as noted  Stopped metformin  Encouraged to resume

## 2022-12-08 ENCOUNTER — HOSPITAL ENCOUNTER (OUTPATIENT)
Dept: RADIOLOGY | Facility: HOSPITAL | Age: 72
Discharge: HOME | End: 2022-12-08
Attending: INTERNAL MEDICINE

## 2022-12-08 DIAGNOSIS — E27.8 ADRENAL NODULE (HCC): ICD-10-CM

## 2022-12-12 ENCOUNTER — TELEPHONE (OUTPATIENT)
Dept: LAB | Facility: HOSPITAL | Age: 72
End: 2022-12-12

## 2022-12-21 ENCOUNTER — TELEPHONE (OUTPATIENT)
Dept: INTERNAL MEDICINE CLINIC | Facility: CLINIC | Age: 72
End: 2022-12-21

## 2022-12-27 ENCOUNTER — CLINICAL SUPPORT (OUTPATIENT)
Dept: INTERNAL MEDICINE CLINIC | Facility: CLINIC | Age: 72
End: 2022-12-27

## 2022-12-27 DIAGNOSIS — E11.40 TYPE 2 DIABETES MELLITUS WITH DIABETIC NEUROPATHY, WITHOUT LONG-TERM CURRENT USE OF INSULIN (HCC): Primary | ICD-10-CM

## 2022-12-27 LAB
CREAT UR-MCNC: 67.6 MG/DL
MICROALBUMIN UR-MCNC: 62.8 MG/L (ref 0–20)
MICROALBUMIN/CREAT 24H UR: 93 MG/G CREATININE (ref 0–30)

## 2023-03-17 ENCOUNTER — TELEPHONE (OUTPATIENT)
Dept: INTERNAL MEDICINE CLINIC | Facility: CLINIC | Age: 73
End: 2023-03-17

## 2023-03-17 DIAGNOSIS — N20.0 BILATERAL NEPHROLITHIASIS: Primary | ICD-10-CM

## 2023-04-24 ENCOUNTER — TRANSCRIBE ORDERS (OUTPATIENT)
Dept: LAB | Facility: CLINIC | Age: 73
End: 2023-04-24

## 2023-04-24 ENCOUNTER — APPOINTMENT (OUTPATIENT)
Dept: LAB | Facility: CLINIC | Age: 73
End: 2023-04-24

## 2023-04-24 DIAGNOSIS — N20.0 KIDNEY STONE: Primary | ICD-10-CM

## 2023-04-24 LAB
BACTERIA UR QL AUTO: ABNORMAL /HPF
BILIRUB UR QL STRIP: NEGATIVE
CLARITY UR: CLEAR
COLOR UR: ABNORMAL
GLUCOSE UR STRIP-MCNC: ABNORMAL MG/DL
HGB UR QL STRIP.AUTO: NEGATIVE
HYALINE CASTS #/AREA URNS LPF: ABNORMAL /LPF
KETONES UR STRIP-MCNC: ABNORMAL MG/DL
LEUKOCYTE ESTERASE UR QL STRIP: ABNORMAL
MUCOUS THREADS UR QL AUTO: ABNORMAL
NITRITE UR QL STRIP: NEGATIVE
NON-SQ EPI CELLS URNS QL MICRO: ABNORMAL /HPF
PH UR STRIP.AUTO: 6 [PH]
PROT UR STRIP-MCNC: ABNORMAL MG/DL
RBC #/AREA URNS AUTO: ABNORMAL /HPF
SP GR UR STRIP.AUTO: 1.03 (ref 1–1.03)
UROBILINOGEN UR STRIP-ACNC: <2 MG/DL
WBC #/AREA URNS AUTO: ABNORMAL /HPF

## 2023-05-12 ENCOUNTER — CONSULT (OUTPATIENT)
Dept: INTERNAL MEDICINE CLINIC | Facility: CLINIC | Age: 73
End: 2023-05-12

## 2023-05-12 VITALS
WEIGHT: 204.4 LBS | DIASTOLIC BLOOD PRESSURE: 70 MMHG | BODY MASS INDEX: 30.98 KG/M2 | OXYGEN SATURATION: 95 % | TEMPERATURE: 97.7 F | HEIGHT: 68 IN | SYSTOLIC BLOOD PRESSURE: 140 MMHG | HEART RATE: 70 BPM

## 2023-05-12 DIAGNOSIS — Z01.818 PRE-OP EXAM: ICD-10-CM

## 2023-05-12 DIAGNOSIS — E11.40 TYPE 2 DIABETES MELLITUS WITH DIABETIC NEUROPATHY, WITHOUT LONG-TERM CURRENT USE OF INSULIN (HCC): Primary | ICD-10-CM

## 2023-05-12 DIAGNOSIS — I48.20 CHRONIC ATRIAL FIBRILLATION (HCC): ICD-10-CM

## 2023-05-12 DIAGNOSIS — E27.8 ADRENAL NODULE (HCC): ICD-10-CM

## 2023-05-12 DIAGNOSIS — N20.0 BILATERAL NEPHROLITHIASIS: ICD-10-CM

## 2023-05-12 DIAGNOSIS — Z03.89 CORONARY ARTERY DISEASE (CAD) EXCLUDED: ICD-10-CM

## 2023-05-12 DIAGNOSIS — Z01.818 PRE-OPERATIVE CLEARANCE: ICD-10-CM

## 2023-05-12 DIAGNOSIS — R00.0 TACHYCARDIA INDUCED CARDIOMYOPATHY (HCC): ICD-10-CM

## 2023-05-12 DIAGNOSIS — I43 TACHYCARDIA INDUCED CARDIOMYOPATHY (HCC): ICD-10-CM

## 2023-05-12 LAB — SL AMB POCT HEMOGLOBIN AIC: 8.8 (ref ?–6.5)

## 2023-05-12 NOTE — PROGRESS NOTES
Assessment/Plan:    Type 2 diabetes mellitus with neurologic complication, without long-term current use of insulin (HCC)    Lab Results   Component Value Date    HGBA1C 8 8 (A) 05/12/2023   Uncontrolled  Discussed risks of poor wound healing and increased risk of infection  Patient defers medication  Patient is to continue to work on diet and exercise  Limit sugars and carbohydrate intake  Avoid soda, juice, sweets, cookies, desserts, pasta, bread    Eat more whole grains, exercised 30 min of cardio at least 3 times a week  Also recommended daily foot exams to check for sores, and recommended yearly eye exams  Chronic atrial fibrillation (HCC)  Refuses anticoagulation  Discussed risks for surgery with uncontrolled a-fib  Discussed with Dr Jeni Sainz  Increased ASCVD score  Pre-op exam  Patient cleared for surgery, discussed risks with patient based off uncontrolled chronic medical conditions  Will fax consult  Diagnoses and all orders for this visit:    Type 2 diabetes mellitus with diabetic neuropathy, without long-term current use of insulin (HCC)  -     POCT hemoglobin A1c  -     Lipid Panel with Direct LDL reflex; Future    Tachycardia induced cardiomyopathy (HCC)    Chronic atrial fibrillation (HCC)    Adrenal nodule (HCC)    Pre-operative clearance  -     POCT ECG    Coronary artery disease (CAD) excluded    Bilateral nephrolithiasis    Pre-op exam    Other orders  -     Magnesium 100 MG TABS; Take 100 mg by mouth in the morning          Subjective:    Estelita Beltran is a 75y o  year old male who presents to the office today for a preoperative consultation at the request of surgeon Dr Lamar Walters who plans on performing Cystoscopy with Bilateral ureteroscopy with laser lithotripsy, Basket Stone Extraction with Bilateral double J Stent placement, Retrograde Pyelograms on May 24   This consultation is requested for the specific conditions prompting preoperative evaluation (i e  because of potential affect on operative risk): Davina Rincon Planned anesthesia: general  The patient has the following known anesthesia issues: denies   Patients bleeding risk: no recent abnormal bleeding  Patient does not have objections to receiving blood products if needed  Patient is able to walk 4 blocks without symptoms  Patient is able to walk up 2 flights of stairs without symptoms       Significant past medical history includes:  Patient Active Problem List   Diagnosis   • Right hip pain   • Chronic pain   • Coronary artery disease (CAD) excluded   • Osteoarthrosis   • Chronic atrial fibrillation (HCC)   • PTSD (post-traumatic stress disorder)   • Type 2 diabetes mellitus with neurologic complication, without long-term current use of insulin (HCC)   • Tachycardia induced cardiomyopathy (HCC)   • Scoliosis   • Chronic right sacroiliac joint pain   • Microscopic hematuria   • Hepatitis C virus infection resolved after antiviral drug therapy   • Chronic midline low back pain without sciatica   • Bilateral nephrolithiasis   • Adrenal nodule (HCC)   • Callus of foot   • Medicare annual wellness visit, subsequent   • Benign prostatic hyperplasia with nocturia   • Continuous opioid dependence ([facility] )   • Tick bite of left wrist   • Intractable headache   • COVID-19 virus infection   • COVID-19 vaccination refused   • Pre-op exam         Tobacco use: quit in 1977  Alcohol use: rare  Illicit drug use: denies    Symptoms:   Easy bleeding: no  Easy bruising: no  Frequent nose bleeds: no  Chest pain: no  Cough: no  Dyspnea on exertion:no  Edema: no  Palpitations: no  Wheezing: no      The following portions of the patient's history were reviewed and updated as appropriate: allergies, current medications, past family history, past medical history, past social history, past surgical history and problem list     Review of Systems  Review of Systems   Constitutional: Negative for activity change, appetite change, chills, diaphoresis and fever    HENT: Negative for congestion, ear discharge, ear pain, postnasal drip, rhinorrhea, sinus pressure, sinus pain and sore throat  Eyes: Negative for pain, discharge, itching and visual disturbance  Respiratory: Negative for cough, chest tightness, shortness of breath and wheezing  Cardiovascular: Negative for chest pain, palpitations and leg swelling  Gastrointestinal: Negative for abdominal pain, constipation, diarrhea, nausea and vomiting  Endocrine: Negative for polydipsia, polyphagia and polyuria  Genitourinary: Negative for difficulty urinating, dysuria and urgency  Musculoskeletal: Negative for arthralgias, back pain and neck pain  Skin: Negative for rash and wound  Neurological: Negative for dizziness, weakness, numbness and headaches           Past Medical History:   Diagnosis Date   • Arthritis    • Atrial fibrillation ([facility] )    • COVID-19 2022   • Diabetes 1 5, 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[facility] )    • Essential hypertension 3/7/2016   • Hepatitis C, acute    • HTN (hypertension)    • Influenza    • Neuropathy    • Pneumonia 2022   • Viral illness 2021     Past Surgical History:   Procedure Laterality Date   • CYSTOSTOMY W/ BLADDER DILATION      basket extraction of calculus   • FEMUR FRACTURE SURGERY     • LITHOTRIPSY     • PATELLA FRACTURE SURGERY     • STONE ANALYSIS (HISTORICAL)       Social History     Tobacco Use   • Smoking status: Former     Packs/day: 0 50     Years: 10 00     Pack years: 5 00     Types: Cigarettes     Start date:      Quit date: 1977     Years since quittin 3   • Smokeless tobacco: Never   Vaping Use   • Vaping Use: Never used   Substance Use Topics   • Alcohol use: Not Currently     Comment: 1 per month   • Drug use: Not Currently     Family History   Problem Relation Age of Onset   • Dementia Mother    • Pneumonia Father         Acinetobacter   • Diabetes Father    • Colon cancer Sister    • Lung cancer Sister    • Breast cancer Sister "   • Lung cancer Brother    • Bone cancer Daughter      Patient has no known allergies  Current Outpatient Medications   Medication Sig Dispense Refill   • Ascorbic Acid, Vitamin C, (VITAMIN C) 100 MG tablet Take by mouth daily     • aspirin 81 MG tablet Take 162 mg by mouth 2 (two) times a day      • cholecalciferol (VITAMIN D3) 1,000 units tablet Take 2 tablets (2,000 Units total) by mouth daily 7 tablet 0   • COENZYME Q10 PO Take 1 capsule by mouth daily      • glucose blood test strip Accu-Chek Guide test once daily     • Magnesium 100 MG TABS Take 100 mg by mouth in the morning     • Multiple Vitamins-Minerals (AIRBORNE GUMMIES PO) Take 3 tablets by mouth daily     • NON FORMULARY Red Beet Powder daily     • traMADol (ULTRAM) 50 mg tablet Take 1 tablet (50 mg total) by mouth every 6 (six) hours as needed for moderate pain 120 tablet 0   • ZINC GLUCONATE PO Take 15 mg by mouth daily     • Empagliflozin (Jardiance) 25 MG TABS TAKE ONE-HALF TABLET BY MOUTH DAILY **NOTE NEW STRENGTH AND DOSE** (Patient not taking: Reported on 5/12/2023)     • vitamin B-12 (VITAMIN B-12) 1,000 mcg tablet Take 1,000 mcg by mouth daily  (Patient not taking: Reported on 5/12/2023)       No current facility-administered medications for this visit  Objective:       /70 (BP Location: Left arm, Patient Position: Sitting, Cuff Size: Standard)   Pulse 70   Temp 97 7 °F (36 5 °C) (Temporal)   Ht 5' 7 6\" (1 717 m)   Wt 92 7 kg (204 lb 6 4 oz)   SpO2 95%   BMI 31 45 kg/m²   Physical Exam  Constitutional:       General: He is not in acute distress  Appearance: He is well-developed  He is not diaphoretic  HENT:      Head: Normocephalic and atraumatic  Right Ear: External ear normal       Left Ear: External ear normal       Nose: Nose normal       Mouth/Throat:      Pharynx: No oropharyngeal exudate  Eyes:      General:         Right eye: No discharge  Left eye: No discharge        Conjunctiva/sclera: " Conjunctivae normal       Pupils: Pupils are equal, round, and reactive to light  Neck:      Thyroid: No thyromegaly  Cardiovascular:      Rate and Rhythm: Normal rate  Rhythm irregular  Heart sounds: Normal heart sounds  No murmur heard  No friction rub  No gallop  Pulmonary:      Effort: Pulmonary effort is normal  No respiratory distress  Breath sounds: Normal breath sounds  No stridor  No wheezing or rales  Abdominal:      General: Bowel sounds are normal  There is no distension  Palpations: Abdomen is soft  Tenderness: There is no abdominal tenderness  Musculoskeletal:      Cervical back: Normal range of motion and neck supple  Lymphadenopathy:      Cervical: No cervical adenopathy  Skin:     General: Skin is warm and dry  Findings: No erythema or rash  Neurological:      Mental Status: He is alert and oriented to person, place, and time  Psychiatric:         Behavior: Behavior normal          Thought Content: Thought content normal          Judgment: Judgment normal               Cardiographics  ECG: afib with RVR        Lab Review   Transcribe Orders on 04/24/2023   Component Date Value   • Color, UA 04/24/2023 Light Orange    • Clarity, UA 04/24/2023 Clear    • Specific Gravity, UA 04/24/2023 1 030    • pH, UA 04/24/2023 6 0    • Leukocytes, UA 04/24/2023 Elevated glucose may cause decreased leukocyte values   See urine microscopic for [facility] result/ (A)    • Nitrite, UA 04/24/2023 Negative    • Protein, UA 04/24/2023 200 (2+) (A)    • Glucose, UA 04/24/2023 >=1000 (1%) (A)    • Ketones, UA 04/24/2023 20 (1+) (A)    • Urobilinogen, UA 04/24/2023 <2 0    • Bilirubin, UA 04/24/2023 Negative    • Occult Blood, UA 04/24/2023 Negative    • RBC, UA 04/24/2023 2-4 (A)    • WBC, UA 04/24/2023 4-10 (A)    • Epithelial Cells 04/24/2023 Occasional    • Bacteria, UA 04/24/2023 Occasional    • MUCUS THREADS 04/24/2023 Occasional (A)    • Hyaline Casts, UA 04/24/2023 3-5 (A) Assessment:     75 y o  male or female with planned surgery as above  Known risk factors for perioperative complications: Diabetes mellitus        RCRI  High Risk surgery? 1 Point  CAD History:         1 Point   MI; Positive Stress Test; CP due to Mi;  Nitrate Usage to control Angina; Pathologic Q wave on EKG  CHF Active:         1 Point   Pulm Edema; Paroxysmal Nocturnal Dyspnea;  Bibasilar Rales (crackles);S3; CHF on CXR  Cerebrovascular Disease (TIA or CVA):     1 Point  DM on Insulin:        1 Point  Serum Creat >2 0 mg/dl:       1 Point          Total Points: 0     Scorin: Class I, Very Low Risk (0 4%)     1: Class II, Low risk (0 9%)     2: Class III Moderate (6 6%)     3: Class IV High (>11%)    Cardiac Risk Estimation: 1    Victor Manuel Mcmanus is cleared from a cardiovascular standpoint to proceed with surgery  he is at a moderate risk from a cardiovascular standpoint at this time without any additional cardiac testing  Reevaluation needed if he should present with symptoms prior to surgery  Plan:  Preoperative workup as follows none  Change in medication regimen before surgery: patient is to discuss with surgeon prior to surgery

## 2023-05-12 NOTE — PROGRESS NOTES
Diabetic Foot Exam    Patient's shoes and socks removed  Right Foot/Ankle   Right Foot Inspection  Skin Exam: skin normal and skin intact  No dry skin, no warmth, no callus, no erythema, no maceration, no abnormal color, no pre-ulcer, no ulcer and no callus  Sensory   Monofilament testing: intact    Vascular  Capillary refills: < 3 seconds  The right DP pulse is 1+  Left Foot/Ankle  Left Foot Inspection  Skin Exam: skin normal and skin intact  No dry skin, no warmth, no erythema, no maceration, normal color, no pre-ulcer, no ulcer and no callus  Sensory   Monofilament testing: intact    Vascular  Capillary refills: < 3 seconds  The left DP pulse is 1+       Assign Risk Category  No deformity present  No loss of protective sensation  Weak pulses

## 2023-05-12 NOTE — ASSESSMENT & PLAN NOTE
Patient cleared for surgery, discussed risks with patient based off uncontrolled chronic medical conditions  Will fax consult

## 2023-05-12 NOTE — ASSESSMENT & PLAN NOTE
Refuses anticoagulation  Discussed risks for surgery with uncontrolled a-fib  Discussed with Dr Norris Shelton  Increased ASCVD score

## 2023-06-07 ENCOUNTER — TELEPHONE (OUTPATIENT)
Dept: OTHER | Facility: OTHER | Age: 73
End: 2023-06-07

## 2023-06-07 RX ORDER — TAMSULOSIN HYDROCHLORIDE 0.4 MG/1
0.4 CAPSULE ORAL
COMMUNITY
Start: 2023-05-24

## 2023-06-08 ENCOUNTER — OFFICE VISIT (OUTPATIENT)
Dept: INTERNAL MEDICINE CLINIC | Facility: CLINIC | Age: 73
End: 2023-06-08
Payer: MEDICARE

## 2023-06-08 VITALS
WEIGHT: 198.6 LBS | SYSTOLIC BLOOD PRESSURE: 122 MMHG | BODY MASS INDEX: 30.1 KG/M2 | HEART RATE: 82 BPM | HEIGHT: 68 IN | DIASTOLIC BLOOD PRESSURE: 84 MMHG | OXYGEN SATURATION: 96 % | TEMPERATURE: 99.1 F

## 2023-06-08 DIAGNOSIS — N20.0 BILATERAL NEPHROLITHIASIS: ICD-10-CM

## 2023-06-08 DIAGNOSIS — F11.90 CHRONIC, CONTINUOUS USE OF OPIOIDS: ICD-10-CM

## 2023-06-08 DIAGNOSIS — E27.8 ADRENAL NODULE (HCC): ICD-10-CM

## 2023-06-08 DIAGNOSIS — E11.40 TYPE 2 DIABETES MELLITUS WITH DIABETIC NEUROPATHY, WITHOUT LONG-TERM CURRENT USE OF INSULIN (HCC): Primary | ICD-10-CM
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
## 2023-06-08 PROBLEM — S60.862A: Status: RESOLVED | Noted: 2021-11-26 | Resolved: 2023-06-08

## 2023-06-08 PROBLEM — U07.1 COVID-19 VIRUS INFECTION: Status: RESOLVED | Noted: 2022-01-27 | Resolved: 2023-06-08

## 2023-06-08 PROBLEM — Z28.21 COVID-19 VACCINATION REFUSED: Status: RESOLVED | Noted: 2022-01-27 | Resolved: 2023-06-08

## 2023-06-08 PROBLEM — Z86.19 HEPATITIS C VIRUS INFECTION RESOLVED AFTER ANTIVIRAL DRUG THERAPY: Status: RESOLVED | Noted: 2019-10-17 | Resolved: 2023-06-08

## 2023-06-08 PROCEDURE — 99213 OFFICE O/P EST LOW 20 MIN: CPT | Performed by: INTERNAL MEDICINE

## 2023-06-08 NOTE — PROGRESS NOTES
Name: Jerline Cranker      : 1950      MRN: [id number]  Encounter Provider: Patel Gonzalez MD  Encounter Date: 2023   Encounter department: [address] [place] [address]     1  Type 2 diabetes mellitus with diabetic neuropathy, without long-term current use of insulin ([facility] )  -     Ambulatory Referral to Ophthalmology; Future  -     CBC; Future  -     Comprehensive metabolic panel; Future  -     Hemoglobin A1C; Future  -     Albumin / creatinine urine ratio    2  Bilateral nephrolithiasis  -     CBC; Future  -     Comprehensive metabolic panel; Future    3  Chronic, continuous use of opioids  -     CBC; Future  -     Comprehensive metabolic panel; Future    4  Adrenal nodule (HCC)  Assessment & Plan:  Unchanged  Non functioning      Orders:  -     CBC; Future  -     Comprehensive metabolic panel; Future           Subjective      Presents to the office for a follow-up visit for his diabetes and he reports to be feeling quite well  He recently underwent cystoscopy and laser lithotripsy with basket retrieval of his bilateral kidney stones and he feels great  Procedure went without incident  He still has stents in place but is not complaining of any discomfort  He will be seen for follow-up next week for stent removal   He has had no issues with any chills or fever, nausea or vomiting, flank discomfort etc  he had some labs drawn which shows his diabetes to be suboptimally controlled with his hemoglobin A1c at 8 8  The patient had self discontinued Jardiance because it was causing excessive urination and incontinence  Stone analysis reveals calcium oxalate monohydrate and dihydrate  Other labs reviewed include his lipid profile which disclosed elevated levels of LDL cholesterol at 140, total cholesterol 207 and HDL cholesterol at 40 with normal triglycerides  Review of Systems   Constitutional: Negative    Negative for activity change, appetite change, "chills, diaphoresis, fatigue, fever and unexpected weight change  HENT: Negative  Eyes: Negative  Respiratory: Negative  Cardiovascular: Negative  Gastrointestinal: Negative  Endocrine: Negative  Genitourinary: Negative  Musculoskeletal: Negative  Skin: Negative  Neurological: Negative  Hematological: Negative  Psychiatric/Behavioral: The patient is not nervous/anxious  Current Outpatient Medications on File Prior to Visit   Medication Sig   • Ascorbic Acid, Vitamin C, (VITAMIN C) 100 MG tablet Take by mouth daily   • aspirin 81 MG tablet Take 162 mg by mouth 2 (two) times a day    • cholecalciferol (VITAMIN D3) 1,000 units tablet Take 2 tablets (2,000 Units total) by mouth daily   • COENZYME Q10 PO Take 1 capsule by mouth daily    • glucose blood test strip Accu-Chek Guide test once daily   • Magnesium 100 MG TABS Take 100 mg by mouth in the morning   • Multiple Vitamins-Minerals (AIRBORNE GUMMIES PO) Take 3 tablets by mouth daily   • NON FORMULARY Red Beet Powder daily   • traMADol (ULTRAM) 50 mg tablet Take 1 tablet (50 mg total) by mouth every 6 (six) hours as needed for moderate pain   • vitamin B-12 (VITAMIN B-12) 1,000 mcg tablet Take 1,000 mcg by mouth daily   • ZINC GLUCONATE PO Take 15 mg by mouth daily   • tamsulosin (FLOMAX) 0 4 mg Take 0 4 mg by mouth (Patient not taking: Reported on 6/8/2023)   • [DISCONTINUED] budesonide (Pulmicort Flexhaler) 180 MCG/ACT inhaler Inhale 2 puffs 2 (two) times a day Rinse mouth after use  • [DISCONTINUED] Empagliflozin (Jardiance) 25 MG TABS        Objective     /84 (BP Location: Left arm, Patient Position: Sitting, Cuff Size: Standard)   Pulse 82   Temp 99 1 °F (37 3 °C) (Tympanic)   Ht 5' 7 52\" (1 715 m)   Wt 90 1 kg (198 lb 9 6 oz)   SpO2 96%   BMI 30 63 kg/m²     Physical Exam  Vitals reviewed  Constitutional:       General: He is not in acute distress  Appearance: Normal appearance  He is normal weight   " He is not ill-appearing, toxic-appearing or diaphoretic  HENT:      Head: Normocephalic and atraumatic  Right Ear: External ear normal       Left Ear: External ear normal       Nose: Nose normal    Eyes:      General: No scleral icterus  Conjunctiva/sclera: Conjunctivae normal       Pupils: Pupils are equal, round, and reactive to light  Neck:      Vascular: No carotid bruit or JVD  Trachea: No tracheal deviation  Cardiovascular:      Rate and Rhythm: Normal rate and regular rhythm  Pulses: Normal pulses  Heart sounds: Normal heart sounds  No murmur heard  Pulmonary:      Effort: Pulmonary effort is normal  No respiratory distress  Breath sounds: Normal breath sounds  No rales  Abdominal:      General: Abdomen is flat  There is no distension  Musculoskeletal:         General: No swelling  Cervical back: Neck supple  Right lower leg: No edema  Left lower leg: No edema  Skin:     General: Skin is warm  Coloration: Skin is not jaundiced  Findings: No bruising, erythema or rash  Neurological:      General: No focal deficit present  Mental Status: He is alert and oriented to person, place, and time  Mental status is at baseline     Psychiatric:         Mood and Affect: Mood normal          Behavior: Behavior normal        Marimar Jose MD

## 2023-09-13 NOTE — TELEPHONE ENCOUNTER
Probiotic 2 hours after each antibiotic dose       Please let us know if symptoms persist, or worsen.  Follow up if new symptoms occur.    Should improve at 72 hours, resolve with treatment       Upon review of the In Basket request we were able to locate, review, and update the patient chart as requested for CRC: Cassi  Any additional questions or concerns should be emailed to the Practice Liaisons via [email or web address]  [email or web address] email, please do not reply via In Basket      Thank you  Rina Germain

## 2023-10-12 ENCOUNTER — OFFICE VISIT (OUTPATIENT)
Age: 73
End: 2023-10-12
Payer: MEDICARE

## 2023-10-12 VITALS
HEART RATE: 109 BPM | HEIGHT: 67 IN | TEMPERATURE: 98.4 F | WEIGHT: 195.4 LBS | DIASTOLIC BLOOD PRESSURE: 66 MMHG | BODY MASS INDEX: 30.67 KG/M2 | OXYGEN SATURATION: 98 % | SYSTOLIC BLOOD PRESSURE: 114 MMHG

## 2023-10-12 DIAGNOSIS — I48.20 CHRONIC ATRIAL FIBRILLATION (HCC): ICD-10-CM

## 2023-10-12 DIAGNOSIS — G89.29 CHRONIC MIDLINE LOW BACK PAIN WITHOUT SCIATICA: ICD-10-CM

## 2023-10-12 DIAGNOSIS — F11.90 CHRONIC, CONTINUOUS USE OF OPIOIDS: ICD-10-CM

## 2023-10-12 DIAGNOSIS — M54.50 CHRONIC MIDLINE LOW BACK PAIN WITHOUT SCIATICA: ICD-10-CM

## 2023-10-12 DIAGNOSIS — E11.40 TYPE 2 DIABETES MELLITUS WITH DIABETIC NEUROPATHY, WITHOUT LONG-TERM CURRENT USE OF INSULIN (HCC): Primary | ICD-10-CM

## 2023-10-12 DIAGNOSIS — N20.0 BILATERAL NEPHROLITHIASIS: ICD-10-CM

## 2023-10-12 PROCEDURE — 82043 UR ALBUMIN QUANTITATIVE: CPT | Performed by: INTERNAL MEDICINE

## 2023-10-12 PROCEDURE — 99214 OFFICE O/P EST MOD 30 MIN: CPT | Performed by: INTERNAL MEDICINE

## 2023-10-12 PROCEDURE — 82570 ASSAY OF URINE CREATININE: CPT | Performed by: INTERNAL MEDICINE

## 2023-10-12 RX ORDER — CARBOXYMETHYLCELLULOSE SODIUM 5 MG/ML
SOLUTION/ DROPS OPHTHALMIC
COMMUNITY
Start: 2023-08-24

## 2023-10-12 RX ORDER — TRAMADOL HYDROCHLORIDE 50 MG/1
50 TABLET ORAL EVERY 6 HOURS PRN
Qty: 60 TABLET | Refills: 1 | Status: SHIPPED | OUTPATIENT
Start: 2023-10-12

## 2023-10-12 NOTE — ASSESSMENT & PLAN NOTE
Patient has also refused to take any medications at this time for his diabetes preferring to control it through dietary measures. His last A1c was 8.8%.   He was given the lab work for A1c, microalbumin, lipid profile CBC and CMP  Lab Results   Component Value Date    HGBA1C 8.8 (A) 05/12/2023

## 2023-10-12 NOTE — ASSESSMENT & PLAN NOTE
Intermittent but not present now the patient has been doing a series of exercises which has led to significant improvement in his back pain and sciatica

## 2023-10-12 NOTE — ASSESSMENT & PLAN NOTE
Patient refuses recommendations for anticoagulation and also refuses recommendations for rate control medications.   His most recent echocardiogram was in 2016 which showed mildly reduced left ventricular ejection fraction with a EF of 50%

## 2023-10-12 NOTE — PROGRESS NOTES
Name: Zacarias Jack      : 1950      MRN: [id number]  Encounter Provider: Johanny Donaldson MD  Encounter Date: 10/12/2023   Encounter department: [facility]     1. Type 2 diabetes mellitus with diabetic neuropathy, without long-term current use of insulin ([address])  Assessment & Plan:  Patient has also refused to take any medications at this time for his diabetes preferring to control it through dietary measures. His last A1c was 8.8%. He was given the lab work for A1c, microalbumin, lipid profile CBC and CMP  Lab Results   Component Value Date    HGBA1C 8.8 (A) 2023       Orders:  -     CBC; Future  -     Comprehensive metabolic panel; Future  -     Hemoglobin A1C; Future  -     Lipid panel; Future  -     Albumin / creatinine urine ratio    2. Chronic atrial fibrillation [facility])  Assessment & Plan:  Patient refuses recommendations for anticoagulation and also refuses recommendations for rate control medications. His most recent echocardiogram was in 2016 which showed mildly reduced left ventricular ejection fraction with a EF of 50%    Orders:  -     Comprehensive metabolic panel; Future  -     Hemoglobin A1C; Future    3. Chronic midline low back pain without sciatica  Assessment & Plan:  Intermittent but not present now the patient has been doing a series of exercises which has led to significant improvement in his back pain and sciatica    Orders:  -     CBC; Future  -     Comprehensive metabolic panel; Future  -     traMADol (ULTRAM) 50 mg tablet; Take 1 tablet (50 mg total) by mouth every 6 (six) hours as needed for moderate pain    4. Chronic, continuous use of opioids  Assessment & Plan:  Tramadol was renewed which the patient uses intermittently      5. Bilateral nephrolithiasis  Assessment & Plan:  Currently asymptomatic. Orders:  -     traMADol (ULTRAM) 50 mg tablet;  Take 1 tablet (50 mg total) by mouth every 6 (six) hours as needed for moderate pain           Subjective      Patient presents to the office for follow-up visit. In general he states that he is doing very well. He has no major complaints. He has not experienced any flank pain related to his nephrolithiasis in quite some time. He has been doing a series of exercises to strengthen his back which has helped relieve his sciatic symptoms for the most part. He has not experienced any significant back pain in over 6 months. He did not have any lab work drawn. Otherwise he feels well      Review of Systems   Constitutional: Negative. Negative for activity change, appetite change, chills, diaphoresis, fatigue, fever and unexpected weight change. HENT: Negative. Eyes: Negative. Respiratory: Negative. Cardiovascular: Negative. Gastrointestinal: Negative. Endocrine: Negative. Genitourinary: Negative. Musculoskeletal: Negative. Skin: Negative. Neurological: Negative. Hematological: Negative. Psychiatric/Behavioral:  The patient is not nervous/anxious.         Current Outpatient Medications on File Prior to Visit   Medication Sig    Ascorbic Acid, Vitamin C, (VITAMIN C) 100 MG tablet Take by mouth daily    aspirin 81 MG tablet Take 162 mg by mouth 2 (two) times a day     carboxymethylcellulose (REFRESH PLUS) 0.5 % SOLN     cholecalciferol (VITAMIN D3) 1,000 units tablet Take 2 tablets (2,000 Units total) by mouth daily    ciclopirox (PENLAC) 8 % solution APPLY TOPICAL TOPICALLY DAILY FOR FUNGAL INFECTION    COENZYME Q10 PO Take 1 capsule by mouth daily     glucose blood test strip Accu-Chek Guide test once daily    Magnesium 100 MG TABS Take 100 mg by mouth in the morning    Multiple Vitamins-Minerals (AIRBORNE GUMMIES PO) Take 3 tablets by mouth daily    NON FORMULARY Red Beet Powder daily    vitamin B-12 (VITAMIN B-12) 1,000 mcg tablet Take 1,000 mcg by mouth daily    ZINC GLUCONATE PO Take 15 mg by mouth daily    [DISCONTINUED] budesonide (Pulmicort Flexhaler) 180 MCG/ACT inhaler Inhale 2 puffs 2 (two) times a day Rinse mouth after use. [DISCONTINUED] tamsulosin (FLOMAX) 0.4 mg Take 0.4 mg by mouth    [DISCONTINUED] traMADol (ULTRAM) 50 mg tablet Take 1 tablet (50 mg total) by mouth every 6 (six) hours as needed for moderate pain       Objective     /66 (BP Location: Left arm, Patient Position: Sitting, Cuff Size: Standard)   Pulse (!) 109   Temp 98.4 °F (36.9 °C) (Tympanic)   Ht 5' 7.48" (1.714 m)   Wt 88.6 kg (195 lb 6.4 oz)   SpO2 98%   BMI 30.17 kg/m²     Physical Exam  Vitals reviewed. Constitutional:       General: He is not in acute distress. Appearance: Normal appearance. He is normal weight. He is not ill-appearing, toxic-appearing or diaphoretic. HENT:      Head: Normocephalic and atraumatic. Right Ear: External ear normal.      Left Ear: External ear normal.      Nose: Nose normal.   Eyes:      General: No scleral icterus. Conjunctiva/sclera: Conjunctivae normal.      Pupils: Pupils are equal, round, and reactive to light. Neck:      Vascular: No carotid bruit or JVD. Trachea: No tracheal deviation. Cardiovascular:      Rate and Rhythm: Tachycardia present. Rhythm irregular. Pulses: no weak pulses          Dorsalis pedis pulses are 1+ on the right side and 1+ on the left side. Posterior tibial pulses are 1+ on the right side and 1+ on the left side. Heart sounds: Normal heart sounds. No murmur heard. Pulmonary:      Effort: Pulmonary effort is normal. No respiratory distress. Breath sounds: Normal breath sounds. No rales. Abdominal:      General: Abdomen is flat. There is no distension. Musculoskeletal:         General: No swelling. Cervical back: Neck supple. Right lower leg: No edema. Left lower leg: No edema. Feet:    Feet:      Right foot:      Skin integrity: No ulcer, skin breakdown, erythema, warmth, callus or dry skin.       Left foot: Skin integrity: No ulcer, skin breakdown, erythema, warmth, callus or dry skin. Skin:     General: Skin is warm. Coloration: Skin is not jaundiced. Findings: No bruising, erythema or rash. Neurological:      General: No focal deficit present. Mental Status: He is alert and oriented to person, place, and time. Mental status is at baseline. Psychiatric:         Mood and Affect: Mood normal.         Behavior: Behavior normal.     Patient's shoes and socks removed. Right Foot/Ankle   Right Foot Inspection  Skin Exam: skin normal and skin intact. No dry skin, no warmth, no callus, no erythema, no maceration, no abnormal color, no pre-ulcer, no ulcer and no callus. Toe Exam: ROM and strength within normal limits. No swelling, no tenderness, erythema and  no right toe deformity    Sensory   Vibration: diminished  Proprioception: diminished  Monofilament testing: diminished    Vascular  Capillary refills: < 3 seconds  The right DP pulse is 1+. The right PT pulse is 1+. Left Foot/Ankle  Left Foot Inspection  Skin Exam: skin normal and skin intact. No dry skin, no warmth, no erythema, no maceration, normal color, no pre-ulcer, no ulcer and no callus. Toe Exam: ROM and strength within normal limits. No swelling, no tenderness, no erythema and no left toe deformity. Sensory   Vibration: diminished  Proprioception: diminished  Monofilament testing: diminished    Vascular  Capillary refills: < 3 seconds  The left DP pulse is 1+. The left PT pulse is 1+.      Assign Risk Category  No deformity present  Loss of protective sensation  No weak pulses  Risk: 1           Bee Murillo MD

## 2023-10-13 LAB
CREAT UR-MCNC: 141.3 MG/DL
MICROALBUMIN UR-MCNC: 20.8 MG/L
MICROALBUMIN/CREAT 24H UR: 15 MG/G CREATININE (ref 0–30)

## 2023-10-31 NOTE — ASSESSMENT & PLAN NOTE
Lab Results   Component Value Date    HGBA1C 8 8 (A) 05/12/2023   Uncontrolled  Discussed risks of poor wound healing and increased risk of infection  Patient defers medication  Patient is to continue to work on diet and exercise  Limit sugars and carbohydrate intake  Avoid soda, juice, sweets, cookies, desserts, pasta, bread    Eat more whole grains, exercised 30 min of cardio at least 3 times a week  Also recommended daily foot exams to check for sores, and recommended yearly eye exams  gait, locomotion, and balance/gross motor/muscle strength

## 2023-11-30 ENCOUNTER — APPOINTMENT (OUTPATIENT)
Dept: LAB | Facility: CLINIC | Age: 73
End: 2023-11-30
Payer: MEDICARE

## 2023-11-30 DIAGNOSIS — I48.20 CHRONIC ATRIAL FIBRILLATION (HCC): ICD-10-CM

## 2023-11-30 DIAGNOSIS — M54.50 CHRONIC MIDLINE LOW BACK PAIN WITHOUT SCIATICA: ICD-10-CM

## 2023-11-30 DIAGNOSIS — G89.29 CHRONIC MIDLINE LOW BACK PAIN WITHOUT SCIATICA: ICD-10-CM

## 2023-11-30 DIAGNOSIS — E11.40 TYPE 2 DIABETES MELLITUS WITH DIABETIC NEUROPATHY, WITHOUT LONG-TERM CURRENT USE OF INSULIN (HCC): ICD-10-CM

## 2023-11-30 LAB
ALBUMIN SERPL BCP-MCNC: 4.4 G/DL (ref 3.5–5)
ALP SERPL-CCNC: 75 U/L (ref 34–104)
ALT SERPL W P-5'-P-CCNC: 23 U/L (ref 7–52)
ANION GAP SERPL CALCULATED.3IONS-SCNC: 6 MMOL/L
AST SERPL W P-5'-P-CCNC: 13 U/L (ref 13–39)
BILIRUB SERPL-MCNC: 0.73 MG/DL (ref 0.2–1)
BUN SERPL-MCNC: 12 MG/DL (ref 5–25)
CALCIUM SERPL-MCNC: 9.3 MG/DL (ref 8.4–10.2)
CHLORIDE SERPL-SCNC: 103 MMOL/L (ref 96–108)
CHOLEST SERPL-MCNC: 234 MG/DL
CO2 SERPL-SCNC: 27 MMOL/L (ref 21–32)
CREAT SERPL-MCNC: 0.77 MG/DL (ref 0.6–1.3)
CREAT UR-MCNC: 182.7 MG/DL
ERYTHROCYTE [DISTWIDTH] IN BLOOD BY AUTOMATED COUNT: 13.3 % (ref 11.6–15.1)
EST. AVERAGE GLUCOSE BLD GHB EST-MCNC: 237 MG/DL
GFR SERPL CREATININE-BSD FRML MDRD: 89 ML/MIN/1.73SQ M
GLUCOSE P FAST SERPL-MCNC: 247 MG/DL (ref 65–99)
HBA1C MFR BLD: 9.9 %
HCT VFR BLD AUTO: 44.8 % (ref 36.5–49.3)
HDLC SERPL-MCNC: 51 MG/DL
HGB BLD-MCNC: 14.6 G/DL (ref 12–17)
LDLC SERPL CALC-MCNC: 164 MG/DL (ref 0–100)
MCH RBC QN AUTO: 31.1 PG (ref 26.8–34.3)
MCHC RBC AUTO-ENTMCNC: 32.6 G/DL (ref 31.4–37.4)
MCV RBC AUTO: 96 FL (ref 82–98)
MICROALBUMIN UR-MCNC: 31.5 MG/L
MICROALBUMIN/CREAT 24H UR: 17 MG/G CREATININE (ref 0–30)
PLATELET # BLD AUTO: 207 THOUSANDS/UL (ref 149–390)
PMV BLD AUTO: 11.1 FL (ref 8.9–12.7)
POTASSIUM SERPL-SCNC: 4.8 MMOL/L (ref 3.5–5.3)
PROT SERPL-MCNC: 7.2 G/DL (ref 6.4–8.4)
RBC # BLD AUTO: 4.69 MILLION/UL (ref 3.88–5.62)
SODIUM SERPL-SCNC: 136 MMOL/L (ref 135–147)
TRIGL SERPL-MCNC: 96 MG/DL
WBC # BLD AUTO: 5.35 THOUSAND/UL (ref 4.31–10.16)

## 2023-11-30 PROCEDURE — 80061 LIPID PANEL: CPT

## 2023-11-30 PROCEDURE — 80053 COMPREHEN METABOLIC PANEL: CPT

## 2023-11-30 PROCEDURE — 36415 COLL VENOUS BLD VENIPUNCTURE: CPT

## 2023-11-30 PROCEDURE — 83036 HEMOGLOBIN GLYCOSYLATED A1C: CPT

## 2023-11-30 PROCEDURE — 85027 COMPLETE CBC AUTOMATED: CPT

## 2023-12-07 RX ORDER — TAMSULOSIN HYDROCHLORIDE 0.4 MG/1
0.4 CAPSULE ORAL
COMMUNITY
Start: 2023-11-02

## 2023-12-08 ENCOUNTER — OFFICE VISIT (OUTPATIENT)
Age: 73
End: 2023-12-08
Payer: MEDICARE

## 2023-12-08 VITALS
WEIGHT: 192.6 LBS | HEART RATE: 88 BPM | TEMPERATURE: 98.6 F | OXYGEN SATURATION: 97 % | HEIGHT: 67 IN | BODY MASS INDEX: 30.23 KG/M2 | SYSTOLIC BLOOD PRESSURE: 106 MMHG | DIASTOLIC BLOOD PRESSURE: 64 MMHG

## 2023-12-08 DIAGNOSIS — M54.41 CHRONIC MIDLINE LOW BACK PAIN WITH RIGHT-SIDED SCIATICA: ICD-10-CM

## 2023-12-08 DIAGNOSIS — M54.50 CHRONIC MIDLINE LOW BACK PAIN WITHOUT SCIATICA: ICD-10-CM

## 2023-12-08 DIAGNOSIS — F11.90 CHRONIC, CONTINUOUS USE OF OPIOIDS: ICD-10-CM

## 2023-12-08 DIAGNOSIS — M79.651 RIGHT THIGH PAIN: ICD-10-CM

## 2023-12-08 DIAGNOSIS — G89.29 CHRONIC MIDLINE LOW BACK PAIN WITHOUT SCIATICA: ICD-10-CM

## 2023-12-08 DIAGNOSIS — Z00.00 MEDICARE ANNUAL WELLNESS VISIT, SUBSEQUENT: ICD-10-CM

## 2023-12-08 DIAGNOSIS — G89.29 CHRONIC MIDLINE LOW BACK PAIN WITH RIGHT-SIDED SCIATICA: ICD-10-CM

## 2023-12-08 DIAGNOSIS — R00.0 TACHYCARDIA INDUCED CARDIOMYOPATHY (HCC): ICD-10-CM

## 2023-12-08 DIAGNOSIS — I43 TACHYCARDIA INDUCED CARDIOMYOPATHY (HCC): ICD-10-CM

## 2023-12-08 DIAGNOSIS — I48.20 CHRONIC ATRIAL FIBRILLATION (HCC): ICD-10-CM

## 2023-12-08 DIAGNOSIS — G89.21 CHRONIC PAIN DUE TO TRAUMA: ICD-10-CM

## 2023-12-08 DIAGNOSIS — E11.40 TYPE 2 DIABETES MELLITUS WITH DIABETIC NEUROPATHY, WITHOUT LONG-TERM CURRENT USE OF INSULIN (HCC): Primary | ICD-10-CM

## 2023-12-08 PROCEDURE — 99214 OFFICE O/P EST MOD 30 MIN: CPT | Performed by: INTERNAL MEDICINE

## 2023-12-08 PROCEDURE — G0439 PPPS, SUBSEQ VISIT: HCPCS | Performed by: INTERNAL MEDICINE

## 2023-12-08 RX ORDER — TRAMADOL HYDROCHLORIDE 50 MG/1
50 TABLET ORAL EVERY 6 HOURS PRN
Qty: 60 TABLET | Refills: 1 | Status: SHIPPED | OUTPATIENT
Start: 2023-12-08

## 2023-12-08 NOTE — ASSESSMENT & PLAN NOTE
Patient is up-to-date with age-appropriate screenings. He does not receive the annual flu vaccine he is up-to-date with pneumonia vaccine.   He has not received any COVID vaccinations

## 2023-12-08 NOTE — ASSESSMENT & PLAN NOTE
Poorly controlled type 2 diabetes. Patient has been very noncompliant with diet.   Lab Results   Component Value Date    HGBA1C 9.9 (H) 11/30/2023

## 2023-12-08 NOTE — ASSESSMENT & PLAN NOTE
Currently experiencing significant right thigh pain.   We will obtain x-rays of his right femur given his history of a prior fracture and removal of hardware from his right femur

## 2023-12-08 NOTE — ASSESSMENT & PLAN NOTE
Chronic atrial fibrillation with variable heart rate resulting in tachycardia induced cardiomyopathy.   Patient remains hemodynamically stable at this time

## 2023-12-08 NOTE — ASSESSMENT & PLAN NOTE
Currently stable. No complaints. Stone composition primarily of calcium oxalate.   Patient does not wish to take any diuretic therapy

## 2023-12-08 NOTE — PROGRESS NOTES
Assessment and Plan:     Problem List Items Addressed This Visit          Endocrine    Type 2 diabetes mellitus with neurologic complication, without long-term current use of insulin ([address]) - Primary     Poorly controlled type 2 diabetes. Patient has been very noncompliant with diet. Lab Results   Component Value Date    HGBA1C 9.9 (H) 11/30/2023          Relevant Orders    CBC    Comprehensive metabolic panel    Hemoglobin A1C    Lipid panel    Albumin / creatinine urine ratio       Cardiovascular and Mediastinum    Chronic atrial fibrillation (HCC)     Chronic atrial fibrillation with variable heart rate resulting in tachycardia induced cardiomyopathy. Patient remains hemodynamically stable at this time         Relevant Orders    CBC    Comprehensive metabolic panel    Tachycardia induced cardiomyopathy ([address])     Patient refuses to take any rate  limiting medications presently heart rate is 88         Relevant Orders    CBC    Comprehensive metabolic panel       Genitourinary    Bilateral nephrolithiasis     Currently stable. No complaints. Stone composition primarily of calcium oxalate. Patient does not wish to take any diuretic therapy         Relevant Medications    traMADol (ULTRAM) 50 mg tablet       Other    Chronic midline low back pain without sciatica     Intermittent sciatic symptoms. Usually relieved with periodic dosing of tramadol         Chronic pain     Currently experiencing significant right thigh pain. We will obtain x-rays of his right femur given his history of a prior fracture and removal of hardware from his right femur         Chronic, continuous use of opioids     Intermittent use for pain relief. Medicare annual wellness visit, subsequent     Patient is up-to-date with age-appropriate screenings. He does not receive the annual flu vaccine he is up-to-date with pneumonia vaccine.   He has not received any COVID vaccinations          Other Visit Diagnoses       Right thigh pain        Relevant Orders    XR femur 2 vw right    CBC    Comprehensive metabolic panel            Depression Screening and Follow-up Plan: Patient was screened for depression during today's encounter. They screened negative with a PHQ-2 score of 0. Preventive health issues were discussed with patient, and age appropriate screening tests were ordered as noted in patient's After Visit Summary. Personalized health advice and appropriate referrals for health education or preventive services given if needed, as noted in patient's After Visit Summary. History of Present Illness:     Patient presents for a Medicare Wellness Visit    HPI   Patient Care Team:  Tran Gerber MD as PCP - General (Internal Medicine)     Review of Systems:     Review of Systems   Constitutional: Negative. HENT: Negative. Eyes: Negative. Respiratory: Negative. Cardiovascular: Negative. Gastrointestinal: Negative. Endocrine: Negative. Genitourinary: Negative. Musculoskeletal:  Positive for back pain (With occasional right-sided sciatica) and myalgias (Right thigh pain). Skin: Negative. Allergic/Immunologic: Negative. Neurological: Negative. Hematological: Negative. Psychiatric/Behavioral: Negative.           Problem List:     Patient Active Problem List   Diagnosis    Right hip pain    Chronic pain    Osteoarthrosis    Chronic atrial fibrillation (HCC)    PTSD (post-traumatic stress disorder)    Type 2 diabetes mellitus with neurologic complication, without long-term current use of insulin (HCC)    Tachycardia induced cardiomyopathy (HCC)    Scoliosis    Chronic right sacroiliac joint pain    Microscopic hematuria    Chronic midline low back pain without sciatica    Bilateral nephrolithiasis    Adrenal nodule ([address])    Callus of foot    Medicare annual wellness visit, subsequent    Benign prostatic hyperplasia with nocturia    Chronic, continuous use of opioids    Intractable headache Past Medical and Surgical History:     Past Medical History:   Diagnosis Date    Arthritis     Atrial fibrillation ([address])     Coronary artery disease (CAD) excluded 2013    COVID-19 2022    COVID-19 vaccination refused 2022    COVID-19 virus infection 2022    Diabetes 1.5, 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[address])     Essential hypertension 3/7/2016    Hepatitis C virus infection resolved after antiviral drug therapy 10/17/2019    Hepatitis C, acute     HTN (hypertension)     Influenza     Neuropathy     Pneumonia 2022    Tick bite of left wrist 2021    Viral illness 2021     Past Surgical History:   Procedure Laterality Date    CYSTOSTOMY W/ BLADDER DILATION      basket extraction of calculus    FEMUR FRACTURE SURGERY      LITHOTRIPSY      PATELLA FRACTURE SURGERY      STONE ANALYSIS (HISTORICAL)        Family History:     Family History   Problem Relation Age of Onset    Dementia Mother     Pneumonia Father         Acinetobacter    Diabetes Father     Colon cancer Sister     Lung cancer Sister     Breast cancer Sister     Lung cancer Brother     Bone cancer Daughter       Social History:     Social History     Socioeconomic History    Marital status: /Civil Union     Spouse name: None    Number of children: None    Years of education: None    Highest education level: None   Occupational History    None   Tobacco Use    Smoking status: Former     Packs/day: 0.50     Years: 10.00     Total pack years: 5.00     Types: Cigarettes     Start date: 5     Quit date: 1977     Years since quittin.9    Smokeless tobacco: Never   Vaping Use    Vaping Use: Never used   Substance and Sexual Activity    Alcohol use: Not Currently     Comment: 1 per month    Drug use: Not Currently    Sexual activity: None   Other Topics Concern    None   Social History Narrative    None     Social Determinants of Health     Financial Resource Strain: Low Risk  (2023)    Overall Financial Resource Strain (CARDIA)     Difficulty of Paying Living Expenses: Not hard at all   Food Insecurity: Not on file   Transportation Needs: No Transportation Needs (12/8/2023)    PRAPARE - Transportation     Lack of Transportation (Medical): No     Lack of Transportation (Non-Medical): No   Physical Activity: Not on file   Stress: Not on file   Social Connections: Not on file   Intimate Partner Violence: Not on file   Housing Stability: Not on file      Medications and Allergies:     Current Outpatient Medications   Medication Sig Dispense Refill    Ascorbic Acid, Vitamin C, (VITAMIN C) 100 MG tablet Take by mouth daily      aspirin 81 MG tablet Take 162 mg by mouth 2 (two) times a day       carboxymethylcellulose (REFRESH PLUS) 0.5 % SOLN       cholecalciferol (VITAMIN D3) 1,000 units tablet Take 2 tablets (2,000 Units total) by mouth daily 7 tablet 0    ciclopirox (PENLAC) 8 % solution APPLY TOPICAL TOPICALLY DAILY FOR FUNGAL INFECTION      COENZYME Q10 PO Take 1 capsule by mouth daily       glucose blood test strip Accu-Chek Guide test once daily      Magnesium 100 MG TABS Take 100 mg by mouth in the morning      Multiple Vitamins-Minerals (AIRBORNE GUMMIES PO) Take 3 tablets by mouth daily      NON FORMULARY Red Beet Powder daily      tamsulosin (FLOMAX) 0.4 mg 0.4 mg daily at bedtime      traMADol (ULTRAM) 50 mg tablet Take 1 tablet (50 mg total) by mouth every 6 (six) hours as needed for moderate pain 60 tablet 1    vitamin B-12 (VITAMIN B-12) 1,000 mcg tablet Take 1,000 mcg by mouth daily      ZINC GLUCONATE PO Take 15 mg by mouth daily       No current facility-administered medications for this visit.      No Known Allergies   Immunizations:     Immunization History   Administered Date(s) Administered    Hep B, adult 11/18/2015, 12/16/2015, 06/02/2016    Pneumococcal Conjugate 13-Valent 09/03/2015, 04/15/2019    Pneumococcal Polysaccharide PPV23 04/15/2016    Td (adult), Unspecified 01/01/2010    Tdap 12/21/2021 Zoster 02/16/2016    Zoster Vaccine Recombinant 09/03/2019, 12/08/2019, 08/18/2023, 12/01/2023      Health Maintenance:         Topic Date Due    Colorectal Cancer Screening  05/03/2025    Hepatitis C Screening  Completed         Topic Date Due    COVID-19 Vaccine (1) Never done    Influenza Vaccine (1) Never done      Medicare Screening Tests and Risk Assessments:     Chris Albarado is here for his Subsequent Wellness visit. Health Risk Assessment:   Patient rates overall health as good. Patient feels that their physical health rating is same. Patient is satisfied with their life. Eyesight was rated as slightly worse. Hearing was rated as slightly worse. Patient feels that their emotional and mental health rating is same. Patients states they are never, rarely angry. Patient states they are sometimes unusually tired/fatigued. Pain experienced in the last 7 days has been some. Patient's pain rating has been 4/10. Patient states that he has experienced no weight loss or gain in last 6 months. Depression Screening:   PHQ-2 Score: 0      Fall Risk Screening: In the past year, patient has experienced: no history of falling in past year      Home Safety:  Patient does not have trouble with stairs inside or outside of their home. Patient has working smoke alarms and has working carbon monoxide detector. Home safety hazards include: none. Nutrition:   Current diet is Low Carb. Medications:   Patient is currently taking over-the-counter supplements. OTC medications include: see medication list. Patient is able to manage medications. Activities of Daily Living (ADLs)/Instrumental Activities of Daily Living (IADLs):   Walk and transfer into and out of bed and chair?: Yes  Dress and groom yourself?: Yes    Bathe or shower yourself?: Yes    Feed yourself?  Yes  Do your laundry/housekeeping?: Yes  Manage your money, pay your bills and track your expenses?: Yes  Make your own meals?: Yes    Do your own shopping?: Yes    Durable Medical Equipment Suppliers  no    Previous Hospitalizations:   Any hospitalizations or ED visits within the last 12 months?: No      Advance Care Planning:   Living will: No    Durable POA for healthcare: No    Advanced directive: No    Advanced directive counseling given: Yes    ACP document given: Yes    Patient declined ACP directive: No    End of Life Decisions reviewed with patient: Yes    Provider agrees with end of life decisions: Yes      Cognitive Screening:   Provider or family/friend/caregiver concerned regarding cognition?: No    PREVENTIVE SCREENINGS      Cardiovascular Screening:    General: Screening Current      Diabetes Screening:     General: Screening Not Indicated, History Diabetes and Screening Current      Colorectal Cancer Screening:     General: Screening Current      Prostate Cancer Screening:      Due for: PSA      Osteoporosis Screening:    General: Screening Not Indicated      Abdominal Aortic Aneurysm (AAA) Screening:    Risk factors include: age between 70-75 yo and tobacco use        General: Screening Current      Lung Cancer Screening:     General: Screening Not Indicated      Hepatitis C Screening:    General: History Hepatitis C and Screening Current    Screening, Brief Intervention, and Referral to Treatment (SBIRT)    Screening  Typical number of drinks in a day: 0  Typical number of drinks in a week: 0  Interpretation: Low risk drinking behavior. Single Item Drug Screening:  How often have you used an illegal drug (including marijuana) or a prescription medication for non-medical reasons in the past year? never    Single Item Drug Screen Score: 0  Interpretation: Negative screen for possible drug use disorder    Review of Current Opioid Use    Opioid Risk Tool (ORT) Interpretation: Complete Opioid Risk Tool (ORT)    No results found.      Physical Exam:     /64 (BP Location: Left arm, Patient Position: Sitting, Cuff Size: Standard)   Pulse 88   Temp 98.6 °F (37 °C) (Temporal)   Ht 5' 7.32" (1.71 m)   Wt 87.4 kg (192 lb 9.6 oz)   SpO2 97%   BMI 29.88 kg/m²     Physical Exam  Vitals reviewed. Constitutional:       Appearance: He is well-developed and normal weight. HENT:      Head: Normocephalic and atraumatic. Right Ear: External ear normal.      Left Ear: External ear normal.      Mouth/Throat:      Mouth: Mucous membranes are moist.      Pharynx: Oropharynx is clear. Eyes:      General: No scleral icterus. Conjunctiva/sclera: Conjunctivae normal.      Pupils: Pupils are equal, round, and reactive to light. Neck:      Vascular: No carotid bruit. Cardiovascular:      Rate and Rhythm: Normal rate. Rhythm irregular. Heart sounds: Normal heart sounds. No murmur heard. Pulmonary:      Effort: Pulmonary effort is normal. No respiratory distress. Breath sounds: Normal breath sounds. Abdominal:      General: Abdomen is flat. There is no distension. Tenderness: There is no abdominal tenderness. There is no rebound. Musculoskeletal:         General: No swelling, tenderness or deformity. Normal range of motion. Cervical back: Normal range of motion and neck supple. Right lower leg: No edema. Left lower leg: No edema. Skin:     General: Skin is warm. Coloration: Skin is not jaundiced. Findings: No bruising, erythema or rash. Neurological:      General: No focal deficit present. Mental Status: He is alert and oriented to person, place, and time. Mental status is at baseline. Psychiatric:         Mood and Affect: Mood normal.         Behavior: Behavior normal.         Thought Content:  Thought content normal.         Judgment: Judgment normal.          Candace iDaz MD

## 2023-12-18 ENCOUNTER — HOSPITAL ENCOUNTER (OUTPATIENT)
Dept: RADIOLOGY | Facility: HOSPITAL | Age: 73
Discharge: HOME | End: 2023-12-18
Payer: MEDICARE

## 2023-12-18 DIAGNOSIS — M79.651 RIGHT THIGH PAIN: ICD-10-CM

## 2023-12-18 PROCEDURE — 73552 X-RAY EXAM OF FEMUR 2/>: CPT

## 2023-12-29 ENCOUNTER — TELEPHONE (OUTPATIENT)
Age: 73
End: 2023-12-29

## 2023-12-29 NOTE — TELEPHONE ENCOUNTER
Patient called asking to please review his xray results and to please give him a call with the results

## 2024-01-04 ENCOUNTER — TELEPHONE (OUTPATIENT)
Age: 74
End: 2024-01-04

## 2024-01-04 DIAGNOSIS — M54.31 BACK PAIN WITH RIGHT-SIDED SCIATICA: Primary | ICD-10-CM

## 2024-01-04 NOTE — TELEPHONE ENCOUNTER
Patient still having pain in right lower back and buttocks radiating to groin down leg to knee. Had xrays Patient said it did not show anything. Would like to know if a Mri can be ordered? Please advise

## 2024-01-16 ENCOUNTER — HOSPITAL ENCOUNTER (OUTPATIENT)
Dept: MRI IMAGING | Facility: HOSPITAL | Age: 74
Discharge: HOME | End: 2024-01-16
Attending: INTERNAL MEDICINE
Payer: MEDICARE

## 2024-01-16 DIAGNOSIS — M54.31 BACK PAIN WITH RIGHT-SIDED SCIATICA: ICD-10-CM

## 2024-01-16 PROCEDURE — G1004 CDSM NDSC: HCPCS

## 2024-01-16 PROCEDURE — 72148 MRI LUMBAR SPINE W/O DYE: CPT

## 2024-02-21 PROBLEM — Z00.00 MEDICARE ANNUAL WELLNESS VISIT, SUBSEQUENT: Status: RESOLVED | Noted: 2020-03-06 | Resolved: 2024-02-21

## 2024-06-21 ENCOUNTER — OFFICE VISIT (OUTPATIENT)
Age: 74
End: 2024-06-21
Payer: MEDICARE

## 2024-06-21 VITALS
TEMPERATURE: 98.2 F | HEIGHT: 67 IN | DIASTOLIC BLOOD PRESSURE: 82 MMHG | SYSTOLIC BLOOD PRESSURE: 126 MMHG | HEART RATE: 100 BPM | WEIGHT: 190 LBS | BODY MASS INDEX: 29.82 KG/M2 | OXYGEN SATURATION: 98 %

## 2024-06-21 DIAGNOSIS — I43 TACHYCARDIA INDUCED CARDIOMYOPATHY (HCC): ICD-10-CM

## 2024-06-21 DIAGNOSIS — D35.02 ADRENAL ADENOMA, LEFT: ICD-10-CM

## 2024-06-21 DIAGNOSIS — M54.41 CHRONIC MIDLINE LOW BACK PAIN WITH RIGHT-SIDED SCIATICA: ICD-10-CM

## 2024-06-21 DIAGNOSIS — M54.41 CHRONIC RIGHT-SIDED LOW BACK PAIN WITH RIGHT-SIDED SCIATICA: Primary | ICD-10-CM

## 2024-06-21 DIAGNOSIS — G89.29 CHRONIC RIGHT-SIDED LOW BACK PAIN WITH RIGHT-SIDED SCIATICA: Primary | ICD-10-CM

## 2024-06-21 DIAGNOSIS — I48.20 CHRONIC ATRIAL FIBRILLATION (HCC): ICD-10-CM

## 2024-06-21 DIAGNOSIS — G89.29 CHRONIC MIDLINE LOW BACK PAIN WITH RIGHT-SIDED SCIATICA: ICD-10-CM

## 2024-06-21 DIAGNOSIS — E11.40 TYPE 2 DIABETES MELLITUS WITH DIABETIC NEUROPATHY, WITHOUT LONG-TERM CURRENT USE OF INSULIN (HCC): ICD-10-CM

## 2024-06-21 DIAGNOSIS — R00.0 TACHYCARDIA INDUCED CARDIOMYOPATHY (HCC): ICD-10-CM

## 2024-06-21 LAB — SL AMB POCT HEMOGLOBIN AIC: 12.7 (ref ?–6.5)

## 2024-06-21 PROCEDURE — 99214 OFFICE O/P EST MOD 30 MIN: CPT | Performed by: INTERNAL MEDICINE

## 2024-06-21 PROCEDURE — 83036 HEMOGLOBIN GLYCOSYLATED A1C: CPT | Performed by: INTERNAL MEDICINE

## 2024-06-21 RX ORDER — DILTIAZEM HYDROCHLORIDE 240 MG/1
240 CAPSULE, COATED, EXTENDED RELEASE ORAL DAILY
COMMUNITY

## 2024-06-21 RX ORDER — TRAMADOL HYDROCHLORIDE 50 MG/1
50 TABLET ORAL EVERY 6 HOURS PRN
Qty: 60 TABLET | Refills: 2 | Status: SHIPPED | OUTPATIENT
Start: 2024-06-21

## 2024-06-21 RX ORDER — CLOTRIMAZOLE 1 G/ML
SOLUTION TOPICAL
COMMUNITY
Start: 2024-04-17

## 2024-06-21 NOTE — ASSESSMENT & PLAN NOTE
Does not wish to have any additional cardiac studies at this time.  His most recent echocardiogram was 8 years ago

## 2024-06-21 NOTE — ASSESSMENT & PLAN NOTE
Patient refuses diabetic medication.  Lab Results   Component Value Date    HGBA1C 12.7 (A) 06/21/2024

## 2024-06-21 NOTE — PROGRESS NOTES
Ambulatory Visit  Name: Victor Manuel Mcmanus      : 1950      MRN: [id number]  Encounter Provider: Kwesi Sutherland MD  Encounter Date: 2024   Encounter department: [facility] [place]    Assessment & Plan   1. Chronic right-sided low back pain with right-sided sciatica  Assessment & Plan:  Recommended patient make an appointment with comprehensive spine program  2. Type 2 diabetes mellitus with diabetic neuropathy, without long-term current use of insulin (HCC)  Assessment & Plan:  Patient refuses diabetic medication.  Lab Results   Component Value Date    HGBA1C 12.7 (A) 2024     Orders:  -     POCT hemoglobin A1c  -     Glutamic acid decarboxylase; Future; Expected date: 2024  -     C-peptide; Future  -     CBC and differential  -     Comprehensive metabolic panel  3. Chronic midline low back pain with right-sided sciatica  -     traMADol (ULTRAM) 50 mg tablet; Take 1 tablet (50 mg total) by mouth every 6 (six) hours as needed for moderate pain  -     CBC and differential  -     Comprehensive metabolic panel  4. Chronic atrial fibrillation (HCC)  Assessment & Plan:  Patient refuses to take any rate lowering medications.  Orders:  -     CBC and differential  -     Comprehensive metabolic panel  5. Adrenal adenoma, left  Assessment & Plan:  Asymptomatic and unchanged in size over 8 years  6. Tachycardia induced cardiomyopathy (HCC)  Assessment & Plan:  Does not wish to have any additional cardiac studies at this time.  His most recent echocardiogram was 8 years ago       History of Present Illness     The patient presents office for 6 visit.  He has been noncompliant with all medical recommendations regarding his chronic atrial fibrillation, tachycardia induced cardiomyopathy, type 2 diabetes and presents to the office with complaints of right-sided sciatica his labs are reviewed.  His hemoglobin A1c is 12.7% he has not gone for any of his other laboratory studies  "that have been recommended.        Review of Systems   Constitutional:  Positive for activity change (Decreased due to right-sided sciatica). Negative for diaphoresis, fatigue and fever.   HENT: Negative.     Eyes: Negative.    Respiratory: Negative.  Negative for cough, choking, chest tightness, shortness of breath, wheezing and stridor.    Cardiovascular:  Negative for chest pain, palpitations and leg swelling.   Gastrointestinal: Negative.    Endocrine: Negative.    Genitourinary: Negative.    Musculoskeletal:  Positive for back pain.   Skin: Negative.    Allergic/Immunologic: Negative.    Neurological: Negative.    Hematological: Negative.    Psychiatric/Behavioral: Negative.         Objective     /82 (BP Location: Left arm, Patient Position: Sitting, Cuff Size: Adult)   Pulse 100   Temp 98.2 °F (36.8 °C) (Temporal)   Ht 5' 7\" (1.702 m)   Wt 86.2 kg (190 lb)   SpO2 98%   BMI 29.76 kg/m²     Physical Exam  Vitals reviewed.   Constitutional:       General: He is not in acute distress.     Appearance: Normal appearance. He is not ill-appearing, toxic-appearing or diaphoretic.      Comments: Overweight   HENT:      Head: Normocephalic and atraumatic.      Right Ear: External ear normal.      Left Ear: External ear normal.      Nose: Nose normal.   Eyes:      General: No scleral icterus.     Conjunctiva/sclera: Conjunctivae normal.      Pupils: Pupils are equal, round, and reactive to light.   Neck:      Vascular: No JVD.      Trachea: No tracheal deviation.   Cardiovascular:      Rate and Rhythm: Tachycardia present. Rhythm irregular.      Heart sounds: Normal heart sounds. No murmur heard.  Pulmonary:      Effort: Pulmonary effort is normal. No respiratory distress.      Breath sounds: Normal breath sounds.   Abdominal:      General: Abdomen is flat. There is no distension.   Musculoskeletal:      Cervical back: Neck supple.      Right lower leg: No edema.      Left lower leg: No edema.   Skin:     " General: Skin is warm.      Coloration: Skin is not jaundiced.      Findings: No bruising, erythema or rash.   Neurological:      General: No focal deficit present.      Mental Status: He is alert and oriented to person, place, and time. Mental status is at baseline.   Psychiatric:         Mood and Affect: Mood normal.         Behavior: Behavior normal.         Thought Content: Thought content normal.         Judgment: Judgment normal.       Administrative Statements

## 2024-11-18 ENCOUNTER — TELEPHONE (OUTPATIENT)
Dept: INTERNAL MEDICINE CLINIC | Facility: OTHER | Age: 74
End: 2024-11-18

## 2024-11-18 NOTE — TELEPHONE ENCOUNTER
LMOM for pt to call me at [facility] office   Please schedule TCM on or before 11/29    Please send me TEAMS message to see if I am available to take the call.

## 2024-11-20 NOTE — TELEPHONE ENCOUNTER
Left message on machine for patient to call me at [facility].    Dr RUSSELL can see patient on 11/26 at 12 PM.

## 2024-11-22 NOTE — TELEPHONE ENCOUNTER
Left two additional messages for pt to return my call to offer him an appt 11/16 with no return call

## 2024-12-05 ENCOUNTER — RA CDI HCC (OUTPATIENT)
Dept: OTHER | Facility: HOSPITAL | Age: 74
End: 2024-12-05

## 2024-12-05 NOTE — PROGRESS NOTES
HCC coding opportunities          Chart Reviewed number of suggestions sent to Provider: 3     Patients Insurance   E11.65, E11.49 and E11.36  Medicare Insurance: Medicare

## 2025-04-04 NOTE — PROGRESS NOTES
PT Evaluation     Today's date: 2025  Patient name: Victor Manuel Mcmanus  : 1950  MRN: [id number]  Referring provider: No ref. provider found  Dx:   Encounter Diagnosis     ICD-10-CM    1. Occipitotemporal infarct, acute (HCC)  I63.9           Start Time: 0845  Stop Time: 0930  Total time in clinic (min): 45 minutes    Assessment  Impairments: abnormal coordination, abnormal gait, abnormal muscle tone, activity intolerance, impaired balance, impaired physical strength, lacks appropriate home exercise program, participation limitations, activity limitations and endurance    Assessment details: Pt is a 75 year old male presenting to [facility] for an initial evaluation secondary to CVA. Pt presents to clinic with gait and balance impairments inclusive of left side neglect and visual impairment impacting pts ability to safely navigate community and environment, MH of shoe lift on RLE and R knee fx and utilizes SPC assistive device for mobility (in the home/in the community) .      Additionally pt demonstrates strength deficits as seen with MMT scores of <5/5 and 5x STS time of 22.28 demonstrating dec LE strength and power as compared to age matched norms.  Pt is at inc risk for falls based on their FGA score of 20/30  with scores < 20/30 at inc risk for spontaneous falls and scores < 23/30 considered at risk for falls. pts TUG time of 16.0 s w SPC (verbal cues for direction of ambulation and inc time to locate cones) puts them at inc risk for falls as scores > 13.5 are considered at risk for falls.Pts 3m bwd walking time of 9,53 s  indicates pt is at inc risk for falls as scores > 4.5 s are at risk for falls.  6mwt and 10mwt to be complete NV for endurance and gait speed testing. Pt was provided edu on left sided awareness and spatial awareness when navigating environments after stroke.    Pt would benefit from skilled physical therapy to improve  overall QOL inclusive of, strength, coordination,  balance, endurance and functional mobility in the home and in the community as well as reduce their fall risk for improved safety.       Goals  STG (4 weeks)   Improve TUG score of 16 by 2 seconds indicating meaningful improvement in fall risk   Improve FGA score of 20 by 2 indicating meaningful improvement in fall risk   Improve 5x STS score of 22.28 by 2.3 seconds indicating meaningful improvement in fall risk and power generation  Complete 6mwt for assessment of  aerobic endurance    Pt will improve 3m bwd walk time of 9.53 by 1.57s (MDC) to demonstrate an improvement in balance and a reduction in fall risk      LTG (8 weeks)   Improve TUG score to <13.5 seconds indicating meaningful improvement in fall risk   Improve FGA score to > 23/30 indicating meaningful improvement in fall risk   Improve/Reduce 5x STS score to < 18 seconds seconds indicating meaningful improvement in fall risk and power generation   Pt will improve 3m bwd walk time of 9.53  to <6 to demonstrate an improvement in balance and a reduction in fall risk        Plan  Patient would benefit from: skilled physical therapy    Planned therapy interventions: balance, gait training, graded activity, graded exercise, home exercise program, neuromuscular re-education, patient/caregiver education, strengthening, stretching, therapeutic activities and therapeutic exercise    Frequency: 2x week  Duration in weeks: 8  Plan of Care beginning date: 4/8/2025  Plan of Care expiration date: 6/3/2025  Treatment plan discussed with: patient      Subjective Evaluation    History of Present Illness  Mechanism of injury: Per epic chart review:  Repeat CT of the head on 2/24 showed redemonstration of evolving infarct in the right occipital and posterior temporal lobes, now extending to the right parietal lobe. There are small areas of hemorrhagic transformation in these these regions of infarction. There is substantial cytotoxic edema in these regions with sulcal  effacement and on the right as well as and effacement of the portions of the right lateral ventricle. Additional small acute infarct in the inferior aspect of right cerebellar hemisphere, small infarct in the right thalamus. [facility] was consulted for GOC and patient remained treatment focused.    Per pt report:  He is doing okay in terms of his walking and balance but has to take his time. Pt pt is currently using an SPC self reported about 60% of the time ut was not using it pretty much at all before. Pt reports he has been able to use the stairs with a railing. Pt has been walking on various surfaces (grass side walk gravel) with PT when he was in the hospital and has been doing well with that. Wife reports left sided inattention  Patient Goals  Patient goals for therapy: improved balance  Patient goal: improved independence  Pain  No pain reported        Objective           Balance Test         6 Minute Walk Test (ft):         2 Minute Walk Test (ft):         Gait Speed (m/s):         5x Sit To Stand (s): 22.28 ue        TUG (s) 16 spc        FGA  3 m bwd walk 20  9.53        Mini BEST         4 square step test               Sensation Left Right   Kinesthesia     Light Touch WNL WNL   Sharp/Dull     2 Point Discrimination         Muscle Tone Left Right   Modified Brenda Scale     Hamstring 0 0   Gastroc 0 0   Quad         Manual Muscle Testing - Hip Left Right   Flexion 4 4   Extension     Abduction     External Rotation       Manual Muscle Testing - Knee Left Right   Flexion 5 5   Extension 5 5     Manual Muscle Testing - Ankle Left Right   Doriflexion 5 5   Plantarflexion            Short Term Goal Expiration Date:(4/6/25)  Long Term Goal Expiration Date: (6/3/25)  POC Expiration Date: (6/3/25)        POC expires Unit limit Auth Expiration date PT/OT/ST + Visit Limit?   6/3/25 6 neuro 12/31/25 BOMN                           Visit/Unit Tracking  AUTH Status:  Date IE             None ON every 10 Used 1/10               Remaining                     Precautions hx CVA, afb, HTN DM2       Manuals                                        Neuro Re-Ed  Edu on left sided inattention/ neglect after CVA                                                               Ther Ex                                                                        Ther Activity                        Gait Training                        Modalities

## 2025-04-07 NOTE — PROGRESS NOTES
"Speech-Language Pathology Initial Evaluation    Today's date: 2025   Patient’s name: Victor Manuel Mcmanus  : 1950  MRN: [id number]  Safety measures: Hx of CVA, Fall Risk  Referring provider: No ref. provider found    Encounter Diagnosis     ICD-10-CM    1. 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 Other symbolic dysfunctions  R48.8       3. Occipitotemporal infarct, acute (HCC)  I63.9           Assessment:  Based upon results of the RBANS cognitive assessment, overall, the patient tests within the \"Borderline\" range in cognitive function based upon standard norms. However, it should be noted that patient tested \"Average\" in the domains of Language, Attention, and Delayed Memory. Immediate Memory fell into the \"Low Average\" range, just 2 points below \"Average\".  Patient scored \"Extremely Low\" in Visuospatial/Constructional with a score of 50, which resulted in bringing down his overall score. It should also be noted that within the domain of \"Attention\" all points lost were within the Coding task in which patient demonstrated significant signs of left sided neglect and lack of spatial awareness. Therefore, overall, the majority of points lost were to visuospatial tasks.    As patient's deficits are primarily related to visuospatial challenges, recommend that these deficits be targeted in Occupational Therapy as the plan for OT is to address visual field deficits. This has been discussed with OT and they will plan to add these cognitive components to his treatment.     As patient demonstrates good overall speech-language skills, no further ST is warranted. Plan of care was discussed with the patient and his wife and they are in agreement at this time.         Plan:  Patient would benefit from outpatient skilled Speech Therapy services: Cognitive deficits will be addressed in OT    Frequency: N/A  Duration: N/A    Intervention certification from: 2025  Intervention certification to: " "9/8/2025      Subjective:  History of present illness: Patient is a 75 y.o. male who was referred to outpatient skilled Speech Therapy services for a cognitive-linguistic evaluation.     Patient with a prior medical history of A-fib, CAD, diabetes (type 1.5), essential HTN, neuropathy and pneumonia (see history for full list).    2/22/25 - Admitted to [facility] ([place] with acute ischemic right PCA stroke with hemorrhagic transformation with edema and no midline shift.    3/3/25 - Admitted to [facility] ([place]) for intense, multi-disciplinary rehabilitation    SLP Notes and Diagnosis: mild-mod cognitive deficits  \"Barriers to progress: severity of deficits, reduced but emerging insight to deficits, and left visual deficits. Pt continues to present with mild to mod cognitive linguistic deficits. Pt progressing toward functional goals as expected. Requires minimal cues for successful completion of WF and STM task. Required repetition and further break down of directions of task. Will continue cognitive intervention focusing on STM \"    During today's evaluation both the patient and his wife report no overt speech, language or cognitive concerns.    Patient's goal(s): Would like to be back to baseline or close to it.    Pain: Absent     Hearing: Decreased - hearing aids but is not wearing them currently as they aren't working properly.  Vision: Left sided neglect and depth perception    Home environment/lifestyle: Lives with his wife  Highest level of education: Some college  Vocational status: On disability - retired from the [employer]      Objective:  The Repeatable Battery for the Assessment of Neuropsychological Status (RBANS) is a brief, individually-administered assessment which measures attention, language, visuospatial/constructional abilities, and immediate & delayed memory. The RBANS is intended for use with adolescents to adults, ages 12 to 89 years. The following results were obtained " during the administration of the assessment.    Form: B    Cognitive Domain/Subtest: Index Score: Percentile Rank: Classification:   IMMEDIATE MEMORY 87 19%ile Low Average        1. List Learning (22/40)        2. Story Memory (16/24)       VISUOSPATIAL/  CONSTRUCTIONAL 50 <0.1%ile Extremely Low        3. Figure Copy (8/20)        4. Line Orientation (0/20)       LANGUAGE 92 30%ile Average        5. Picture Naming (9/10)        6. Semantic Fluency (15/40)       ATTENTION 91 27%ile Average        7. Digit Span (16/16)        8. Coding (1/89)       DELAYED MEMORY 90 25%ile Average        9. List Recall (4/10)        10. List Recognition (20/20)        11. Story Recall (7/12)        12. Figure Recall (3/20)         Sum of Index Scores:  410   Total Score:  78   Percentile: 7%ile   Classification: Borderline           Visit Tracking:  POC   Expires Auth Expiration Date ST Visit Limit   Discharged 12/31/25 BOMN          Visit/Unit Tracking:  Auth Status Date 4/8/25   No authorization required Used 1    Remaining BOMN       Intervention Comments:  45 minutes of cognitive evaluation and 60 minutes of documentation time

## 2025-04-08 ENCOUNTER — EVALUATION (OUTPATIENT)
Dept: PHYSICAL THERAPY | Facility: CLINIC | Age: 75
End: 2025-04-08
Payer: MEDICARE

## 2025-04-08 ENCOUNTER — EVALUATION (OUTPATIENT)
Dept: OCCUPATIONAL THERAPY | Facility: CLINIC | Age: 75
End: 2025-04-08
Payer: MEDICARE

## 2025-04-08 ENCOUNTER — EVALUATION (OUTPATIENT)
Dept: SPEECH THERAPY | Facility: CLINIC | Age: 75
End: 2025-04-08
Payer: MEDICARE

## 2025-04-08 DIAGNOSIS — R41.841 COGNITIVE COMMUNICATION DISORDER: Primary | ICD-10-CM

## 2025-04-08 DIAGNOSIS — R48.8 OTHER SYMBOLIC DYSFUNCTIONS: ICD-10-CM

## 2025-04-08 DIAGNOSIS — I63.9 CEREBROVASCULAR ACCIDENT (CVA), UNSPECIFIED MECHANISM (HCC): Primary | ICD-10-CM

## 2025-04-08 DIAGNOSIS — I63.9 OCCIPITOTEMPORAL INFARCT, ACUTE (HCC): Primary | ICD-10-CM

## 2025-04-08 DIAGNOSIS — I63.9 OCCIPITOTEMPORAL INFARCT, ACUTE (HCC): ICD-10-CM

## 2025-04-08 PROCEDURE — 97110 THERAPEUTIC EXERCISES: CPT

## 2025-04-08 PROCEDURE — 96125 COGNITIVE TEST BY HC PRO: CPT | Performed by: SPEECH-LANGUAGE PATHOLOGIST

## 2025-04-08 PROCEDURE — 97166 OT EVAL MOD COMPLEX 45 MIN: CPT

## 2025-04-08 PROCEDURE — 97162 PT EVAL MOD COMPLEX 30 MIN: CPT

## 2025-04-08 PROCEDURE — 97112 NEUROMUSCULAR REEDUCATION: CPT

## 2025-04-08 NOTE — PROGRESS NOTES
OCCUPATIONAL THERAPY INITIAL EVALUATION:      4/8/25  Victor Manuel Mcmanus  1950  [id number]  No ref. provider found   Diagnosis ICD-10-CM Associated Orders   1. Cerebrovascular accident (CVA), unspecified mechanism (HCC)  I63.9             Assessment/Plan    SKILLED ANALYSIS:  Pt is a 75 y.o. male referred to Occupational Therapy s/p Cerebrovascular accident (CVA), unspecified mechanism (HCC) [I63.9].  Pt participated in skilled OT evaluation and following formalized testing as well as clinical observation, Pt presents with the following areas of deficit:  B/L eye teaming, slowed scanning to L side, L side awareness/inattention, L visual field deficits with inabilities to see visual target until at MIDLINE during two person confrontation test, decreased insight/awareness into L visual field deficits. Pt reports to have been having some double vision prior to his stroke which he would often close one eye and turn his head. He states that since the stroke he has still done that but feels his biggest concern is his depth perception. Pt completed vision testing this date with overall minimal deficits noted in convergence and ROM, however significant deficits on the L side during visual field testing. Pt and wife both educated on the differences between OT, PT, and ST as well as the benefits of each in regards to diagnosis (CVA). Pt completed ST evaluation, however reports minimal cog deficits and scoring impacted on RBANS related to visual deficits and attention. Goals added below for attention and direction following. Pt also educated on scanning using eyes and head when walking to the L side to avoid injury. Pt will benefit from skilled Occupational Therapy services 2x/week for 8 weeks with focus on there act, there ex, and neuro re-ed.     Today's treatment: Pt educated on and work through vision HEP with good understanding.     Access Code: [email or web address]  URL: [email or web address]  Date: 04/08/2025  Prepared  by: Araceli Galindo    Exercises  - Pencil Pushups  - 1 x daily - 7 x weekly - 3 sets - 10 reps  - Seated Gaze Stabilization with Head Nod  - 1 x daily - 7 x weekly - 3 sets - 10 reps  - Seated Horizontal Saccades  - 1 x daily - 7 x weekly - 3 sets - 10 reps      POC expires Unit limit Auth Expiration date PT/OT/ST + Visit Limit?   6/3/25 N/A 12/31/25 BOMN                           Visit/Unit Tracking  AUTH Status:  Date 4/8             BOMN Used 1              Remaining  7                  Duration in weeks: 8 weeks  Plan of care beginning date: 4/8/25  Plan of care expiration date: 6/3/25  PN #1 due: 5/8/25    Precautions: L side inattention,       GOALS    Short Term Goals (4 weeks):  Cognition  - Pt will demo ability to participate in dual tasking/divided attention task with 50% accuracy in multimodal environment to simulate return to life roles  - Pt will increase auditory processing speed for note taking/direction following requiring repetition of directions <50% of the time for improved role performance and engagement in salient tasks    Vision  - Pt will demo with G carryover of compensatory and visual search strategies for visual tracking/gazing in LEFT peripheral visual fields to improve functional performance in salient tasks and safety   - Pt will improve visual fixation to > 10 seconds on one target to L visual fields demo improved focus, attention, and visual memory    - Pt will demo with improved abilities to identify visual cues in L  peripheral visual field x 50% for increased safety in multimodal environments   - Pt will demo with improved figure ground visual perceptual skills x 25% for improved visual attention when driving, identifying items in busy environments in home environment, or to improved abilities locating items in grocery stores     Long Term Goals (8 weeks):   - Pt will be independent with vision and motor HEP focusing on visual tracking, saccades, and pursuits to improve safety for  "functional mobility in new environments  - Pt will increase visual attention to task for 40+ minutes in multimodal environment to improve visual skills for ADLs and IADLs   - Pt will Improve visual pursuits and tracking from right to left visual fields for improved daily performance and completion of salient tasks    Subjective    PATIENT GOAL: \"To be independent and walking better\"    Patient-Specific Functional Scale   Task is scored 0 (unable to perform activity) to 10 (ability to perform activity independently)    Activity Date: Date:   Vision/scanning     2.   Endurance     3.        4.            HISTORY OF PRESENT ILLNESS:     Pt is a 75 y.o. male who was referred to Occupational Therapy s/p Cerebrovascular accident (CVA), unspecified mechanism (HCC) [I63.9]. Pt was in the ER and was in the hospital for about a month and then completed inpatient rehab at [facility]. Pt has discharged from hospital on 3/22 and reports that since being home he has been working to slowly get back to doing things at home as he used to. Pt is currently getting treated for a burn on his upper thigh that he sustained from when in the hospital. Pt reports that he did have some UE weakness but he has been completing a strengthening HEP with 5# wts at home daily. Pt reports some depth perception issues in regards to his vision but overall does not feel that his vision has changed much. He wears glasses's and reports that even prior to the stroke he would sometimes have double vision when he would have to close one eye. Pt does feel that things are a little harder when on the L side or specifically when trying to sit down to make sure the chair is there prior to sitting.   Pt lives with his wife and is able to complete ADLs independently. Pt will do minimal cooking and cleaning at home. Pt does not report many differences, however therapist noting some repetition of conversation throughout evaluation. Pt wife reports that she oversees things " such as medication mgt and walking different places to ensure safety throughout the house. Pt and wife complete [employer] mgmt together but wife mainly completes. 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     PMH:   Past Medical History:   Diagnosis Date    Arthritis     Atrial fibrillation (HCC)     Coronary artery disease (CAD) excluded 2013    COVID-19 2022    COVID-19 vaccination refused 2022    COVID-19 virus infection 2022    Diabetes 1.5, 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HCC)     Essential hypertension 3/7/2016    Hepatitis C virus infection resolved after antiviral drug therapy 10/17/2019    Hepatitis C, acute     HTN (hypertension)     Influenza     Neuropathy     Pneumonia 2022    Tick bite of left wrist 2021    Viral illness 2021       Past Surgical Hx:   Past Surgical History:   Procedure Laterality Date    CYSTOSTOMY W/ BLADDER DILATION      basket extraction of calculus    FEMUR FRACTURE SURGERY      LITHOTRIPSY      PATELLA FRACTURE SURGERY      STONE ANALYSIS (HISTORICAL)            Pain Levels  Restin    With Activity:  0        Objective    IMPAIRMENTS SECTION            Assessments    Vision         Comments                                        VISION SCREEN         GLASSES (prescription)          Symptoms Include double vision and blurred vision     Last Eye Exam 1 year ago           Visual Acuity (near) R eye  20/     L eye 20/ DNT   Binocularity (near) orthophoria    Binocularity (far) orthophoria    Convergence  NO Diplopia reported  Unable    Accommodation NO Blurriness/loss of focus Reported  Unable   Radu String             Alignment  misalignment    Unable to report a pattern during testing    Mobility             Pursuits smooth in all planes, however cues to scan to L side            Saccades smooth in all planes, slowed when scanning to the L side            Range of Motion WFL    Two person confrontation test Unable to see target until MIDLINE in  all planes Impaired    [place] Cancellation Test DNT         PLANNED THERAPY INTERVENTIONS:  Internal and external memory aides  Multimatrix for saccades/ visual clutter/attention  Hypersensitivity strategies education  Multi-modal environment  Sustained/alternating/divided attention  Tracking tube  Oculomotor control:  saccades, con/divergence  Conv./div. Dynamic tasks  Work stations with timed transitions  Temporal Awareness: Organize the Hour activities  Memory and mental manipulation  Auditory processing with immediate recall  Memory retention with immediate and delayed recall  Edu on cog/vision apps  Dalia alford scanning sheets

## 2025-04-08 NOTE — LETTER
April 8, 2025    Luis E Mcdaniels MD  [address]  [address]  [facility] [place] [address]    Patient: Victor Manuel Mcmanus   YOB: 1950   Date of Visit: 4/8/2025     Encounter Diagnosis     ICD-10-CM    1. Cerebrovascular accident (CVA), unspecified mechanism (HCC)  I63.9           Dear Dr. Luis E Mcdaniels MD:    Thank you for your recent referral of Victor Manuel Mcmanus. Please review the attached evaluation summary from Victor Manuel's recent visit.     Please verify that you agree with the plan of care by signing the attached order.     If you have any questions or concerns, please do not hesitate to call.     I sincerely appreciate the opportunity to share in the care of one of your patients and hope to have another opportunity to work with you in the near future.     Sincerely,    Araceli Galindo, OT      Referring Provider:     I certify that I have read the below Plan of Care and certify the need for these services furnished under this plan of treatment while under my care.                    Luis E Mcdaniels MD  [address]  [address]  [place] [address]  Via Fax: [phone]        OCCUPATIONAL THERAPY INITIAL EVALUATION:      4/8/25  Victor Manuel Mcmanus  1950  [id number]  No ref. provider found   Diagnosis ICD-10-CM Associated Orders   1. Cerebrovascular accident (CVA), unspecified mechanism (HCC)  I63.9             Assessment/Plan    SKILLED ANALYSIS:  Pt is a 75 y.o. male referred to Occupational Therapy s/p Cerebrovascular accident (CVA), unspecified mechanism (HCC) [I63.9].  Pt participated in skilled OT evaluation and following formalized testing as well as clinical observation, Pt presents with the following areas of deficit:  B/L eye teaming, slowed scanning to L side, L side awareness/inattention, L visual field deficits with inabilities to see visual target until at MIDLINE during two person confrontation test, decreased insight/awareness into L visual field deficits. Pt reports to have been  having some double vision prior to his stroke which he would often close one eye and turn his head. He states that since the stroke he has still done that but feels his biggest concern is his depth perception. Pt completed vision testing this date with overall minimal deficits noted in convergence and ROM, however significant deficits on the L side during visual field testing. Pt and wife both educated on the differences between OT, PT, and ST as well as the benefits of each in regards to diagnosis (CVA). Pt also educated on scanning using eyes and head when walking to the L side to avoid injury. Pt will benefit from skilled Occupational Therapy services 2x/week for 8 weeks with focus on there act, there ex, and neuro re-ed.     Today's treatment: Pt educated on and work through vision HEP with good understanding.     Access Code: [email or web address]  URL: [email or web address]  Date: 04/08/2025  Prepared by: Araceli Galindo    Exercises  - Pencil Pushups  - 1 x daily - 7 x weekly - 3 sets - 10 reps  - Seated Gaze Stabilization with Head Nod  - 1 x daily - 7 x weekly - 3 sets - 10 reps  - Seated Horizontal Saccades  - 1 x daily - 7 x weekly - 3 sets - 10 reps      POC expires Unit limit Auth Expiration date PT/OT/ST + Visit Limit?   6/3/25 N/A 12/31/25 BOMN                           Visit/Unit Tracking  AUTH Status:  Date 4/8             BOMN Used 1              Remaining  7                  Duration in weeks: 8 weeks  Plan of care beginning date: 4/8/25  Plan of care expiration date: 6/3/25  PN #1 due: 5/8/25    Precautions: L side inattention,       GOALS    Short Term Goals (4 weeks):  Vision  - Pt will demo with G carryover of compensatory and visual search strategies for visual tracking/gazing in LEFT peripheral visual fields to improve functional performance in salient tasks and safety   - Pt will improve visual fixation to > 10 seconds on one target to L visual fields demo improved focus, attention, and  "visual memory    - Pt will demo with improved abilities to identify visual cues in L  peripheral visual field x 50% for increased safety in multimodal environments   - Pt will demo with improved figure ground visual perceptual skills x 25% for improved visual attention when driving, identifying items in busy environments in home environment, or to improved abilities locating items in grocery stores     Long Term Goals (8 weeks):   - Pt will be independent with vision and motor HEP focusing on visual tracking, saccades, and pursuits to improve safety for functional mobility in new environments  - Pt will increase visual attention to task for 40+ minutes in multimodal environment to improve visual skills for ADLs and IADLs   - Pt will Improve visual pursuits and tracking from right to left visual fields for improved daily performance and completion of salient tasks    Subjective    PATIENT GOAL: \"To be independent and walking better\"    Patient-Specific Functional Scale   Task is scored 0 (unable to perform activity) to 10 (ability to perform activity independently)    Activity Date: Date:   Vision/scanning     2.   Endurance     3.        4.            HISTORY OF PRESENT ILLNESS:     Pt is a 75 y.o. male who was referred to Occupational Therapy s/p Cerebrovascular accident (CVA), unspecified mechanism (HCC) [I63.9]. Pt was in the ER and was in the hospital for about a month and then completed inpatient rehab at [facility]. Pt has discharged from hospital on 3/22 and reports that since being home he has been working to slowly get back to doing things at home as he used to. Pt is currently getting treated for a burn on his upper thigh that he sustained from when in the hospital. Pt reports that he did have some UE weakness but he has been completing a strengthening HEP with 5# wts at home daily. Pt reports some depth perception issues in regards to his vision but overall does not feel that his vision has changed much. He " wears glasses's and reports that even prior to the stroke he would sometimes have double vision when he would have to close one eye. Pt does feel that things are a little harder when on the L side or specifically when trying to sit down to make sure the chair is there prior to sitting.   Pt lives with his wife and is able to complete ADLs independently. Pt will do minimal cooking and cleaning at home. Pt does not report many differences, however therapist noting some repetition of conversation throughout evaluation. Pt wife reports that she oversees things such as medication mgt and walking different places to ensure safety throughout the house. Pt and wife complete [employer] mgmt together but wife mainly completes. 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     PMH:   Past Medical History:   Diagnosis Date   • Arthritis    • Atrial fibrillation (HCC)    • Coronary artery disease (CAD) excluded 2013   • COVID-19 2022   • COVID-19 vaccination refused 2022   • COVID-19 virus infection 2022   • Diabetes 1.5, 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HCC)    • Essential hypertension 3/7/2016   • Hepatitis C virus infection resolved after antiviral drug therapy 10/17/2019   • Hepatitis C, acute    • HTN (hypertension)    • Influenza    • Neuropathy    • Pneumonia 2022   • Tick bite of left wrist 2021   • Viral illness 2021       Past Surgical Hx:   Past Surgical History:   Procedure Laterality Date   • CYSTOSTOMY W/ BLADDER DILATION      basket extraction of calculus   • FEMUR FRACTURE SURGERY     • LITHOTRIPSY     • PATELLA FRACTURE SURGERY     • STONE ANALYSIS (HISTORICAL)            Pain Levels  Restin    With Activity:  0        Objective    IMPAIRMENTS SECTION            Assessments    Vision         Comments                                        VISION SCREEN         GLASSES (prescription)          Symptoms Include double vision and blurred vision     Last Eye Exam 1 year ago            Visual Acuity (near) R eye  20/     L eye 20/ DNT   Binocularity (near) orthophoria    Binocularity (far) orthophoria    Convergence  NO Diplopia reported  Unable    Accommodation NO Blurriness/loss of focus Reported  Unable   Radu String             Alignment  misalignment    Unable to report a pattern during testing    Mobility             Pursuits smooth in all planes, however cues to scan to L side            Saccades smooth in all planes, slowed when scanning to the L side            Range of Motion WFL    Two person confrontation test Unable to see target until MIDLINE in all planes Impaired    [place] Cancellation Test DNT         PLANNED THERAPY INTERVENTIONS:  Internal and external memory aides  Multimatrix for saccades/ visual clutter/attention  Hypersensitivity strategies education  Multi-modal environment  Sustained/alternating/divided attention  Tracking tube  Oculomotor control:  saccades, con/divergence  Conv./div. Dynamic tasks  Work stations with timed transitions  Temporal Awareness: Organize the Hour activities  Memory and mental manipulation  Auditory processing with immediate recall  Memory retention with immediate and delayed recall  Edu on cog/vision apps  Dalia alford scanning sheets

## 2025-04-08 NOTE — LETTER
"2025    Luis E Mcdaniels MD  [address]  [address]  [facility] [address]    Patient: Victor Manuel Mcmanus   YOB: 1950   Date of Visit: 2025     Encounter Diagnosis     ICD-10-CM    1. 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 Other symbolic dysfunctions  R48.8       3. Occipitotemporal infarct, acute (HCC)  I63.9           Dear Dr. Luis E Mcdaniels MD:    Thank you for your recent referral of Victor Manuel Mcmanus. Please review the attached evaluation summary from Victor Manuel's recent visit.     Please verify that you agree with the plan of care by signing the attached order.     If you have any questions or concerns, please do not hesitate to call.     I sincerely appreciate the opportunity to share in the care of one of your patients and hope to have another opportunity to work with you in the near future.     Sincerely,    Bonnie Gray, SLP      Referring Provider:     Based upon review of the patient's progress and continued therapy plan, it is my medical opinion that Victor Manuel Mcmanus should continue speech therapy treatment at the Physical Therapy at [address]:                    Luis E Mcdaniels MD  [address]  [address]  [facility] [address]  Via Fax: [phone]        Speech-Language Pathology Initial Evaluation    Today's date: 2025   Patient’s name: Victor Manuel Mcmanus  : 1950  MRN: [id number]  Safety measures: Hx of CVA, Fall Risk  Referring provider: No ref. provider found    Encounter Diagnosis     ICD-10-CM    1. 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 Other symbolic dysfunctions  R48.8       3. Occipitotemporal infarct, acute (HCC)  I63.9           Assessment:  Based upon results of the RBANS cognitive assessment, overall, the patient tests within the \"Borderline\" range in cognitive function based upon standard norms. However, it should be noted that patient tested \"Average\" in the domains of Language, Attention, and Delayed Memory. " "Immediate Memory fell into the \"Low Average\" range, just 2 points below \"Average\".  Patient scored \"Extremely Low\" in Visuospatial/Constructional with a score of 50, which resulted in bringing down his overall score. It should also be noted that within the domain of \"Attention\" all points lost were within the Coding task in which patient demonstrated significant signs of left sided neglect and lack of spatial awareness. Therefore, overall, the majority of points lost were to visuospatial tasks.    As patient's deficits are primarily related to visuospatial challenges, recommend that these deficits be targeted in Occupational Therapy as the plan for OT is to address visual field deficits. This has been discussed with OT and they will plan to add these cognitive components to his treatment.     As patient demonstrates good overall speech-language skills, no further ST is warranted. Plan of care was discussed with the patient and his wife and they are in agreement at this time.         Plan:  Patient would benefit from outpatient skilled Speech Therapy services: Cognitive deficits will be addressed in OT    Frequency: N/A  Duration: N/A    Intervention certification from: 4/8/2025  Intervention certification to: 9/8/2025      Subjective:  History of present illness: Patient is a 75 y.o. male who was referred to outpatient skilled Speech Therapy services for a cognitive-linguistic evaluation.     Patient with a prior medical history of A-fib, CAD, diabetes (type 1.5), essential HTN, neuropathy and pneumonia (see history for full list).    2/22/25 - Admitted to [facility] with acute ischemic right PCA stroke with hemorrhagic transformation with edema and no midline shift.    3/3/25 - Admitted to [facility] ([place] for intense, multi-disciplinary rehabilitation    SLP Notes and Diagnosis: mild-mod cognitive deficits  \"Barriers to progress: severity of deficits, reduced but emerging insight to " "deficits, and left visual deficits. Pt continues to present with mild to mod cognitive linguistic deficits. Pt progressing toward functional goals as expected. Requires minimal cues for successful completion of WF and STM task. Required repetition and further break down of directions of task. Will continue cognitive intervention focusing on STM \"    During today's evaluation both the patient and his wife report no overt speech, language or cognitive concerns.    Patient's goal(s): Would like to be back to baseline or close to it.    Pain: Absent     Hearing: Decreased - hearing aids but is not wearing them currently as they aren't working properly.  Vision: Left sided neglect and depth perception    Home environment/lifestyle: Lives with his wife  Highest level of education: Some college  Vocational status: On disability - retired from the [employer]      Objective:  The Repeatable Battery for the Assessment of Neuropsychological Status (RBANS) is a brief, individually-administered assessment which measures attention, language, visuospatial/constructional abilities, and immediate & delayed memory. The RBANS is intended for use with adolescents to adults, ages 12 to 89 years. The following results were obtained during the administration of the assessment.    Form: B    Cognitive Domain/Subtest: Index Score: Percentile Rank: Classification:   IMMEDIATE MEMORY 87 19%ile Low Average        1. List Learning (22/40)        2. Story Memory (16/24)       VISUOSPATIAL/  CONSTRUCTIONAL 50 <0.1%ile Extremely Low        3. Figure Copy (8/20)        4. Line Orientation (0/20)       LANGUAGE 92 30%ile Average        5. Picture Naming (9/10)        6. Semantic Fluency (15/40)       ATTENTION 91 27%ile Average        7. Digit Span (16/16)        8. Coding (1/89)       DELAYED MEMORY 90 25%ile Average        9. List Recall (4/10)        10. List Recognition (20/20)        11. Story Recall (7/12)        12. Figure Recall (3/20) "         Sum of Index Scores:  410   Total Score:  78   Percentile: 7%ile   Classification: Borderline           Visit Tracking:  POC   Expires Auth Expiration Date ST Visit Limit   Discharged 12/31/25 BOMN          Visit/Unit Tracking:  Auth Status Date 4/8/25   No authorization required Used 1    Remaining BOMN       Intervention Comments:  45 minutes of cognitive evaluation and 60 minutes of documentation time

## 2025-04-08 NOTE — LETTER
2025    No Recipients    Patient: Victor Manuel Mcmanus   YOB: 1950   Date of Visit: 2025     Encounter Diagnosis     ICD-10-CM    1. Occipitotemporal infarct, acute (HCC)  I63.9           Dear Dr. Luis E Mcdaniels MD:    Thank you for your recent referral of Victor Manuel Mcmanus. Please review the attached evaluation summary from Victor Manuel's recent visit.     Please verify that you agree with the plan of care by signing the attached order.     If you have any questions or concerns, please do not hesitate to call.     I sincerely appreciate the opportunity to share in the care of one of your patients and hope to have another opportunity to work with you in the near future.       Sincerely,    Oma Chowdary, PT      Referring Provider:      I certify that I have read the below Plan of Care and certify the need for these services furnished under this plan of treatment while under my care.                    Luis E Mcdaniels MD  [address]  [address]  [facility] [address]  Via Fax: [phone]          {[facility] PT/OT NOTE TYPE:48660}    Today's date: 2025  Patient name: Victor Manuel Mcmanus  : 1950  MRN: [id number]  Referring provider: No ref. provider found  Dx: No diagnosis found.               Assessment  Impairments: abnormal coordination, abnormal gait, abnormal muscle tone, activity intolerance, impaired balance, impaired physical strength, lacks appropriate home exercise program, participation limitations, activity limitations and endurance    Assessment details: Pt is a *** year old *** presenting to [facility] physical therapy for an initial evaluation secondary to ***. Pt presents to clinic with gait and balance impairments inclusive of *** and utilizes *** assistive device for mobility (in the home/in the community) .      Additionally pt demonstrates strength deficits as seen with MMT scores of *** and 5x STS time of *** demonstrating dec LE strength and power as compared to age matched norms.  Pt demonstrates endurance deficits as seen with 6mwt distance of *** as compared to age matched norms of ***. Pt is at inc risk for falls based on their FGA score of ***/30  with scores < 20/30 at inc risk for spontaneous falls and scores < 23/30 considered at risk for falls. pts TUG time of *** puts them at inc risk for falls as scores > 13.5 are considered at risk for falls. Pt ambulates at *** m/s making them a ***.  Additionally this score places the pt at inc risk for falls as scores < 1 m/s are considered at risk for falls. Pts 3m bwd walking time of *** s  indicates pt is at inc risk for falls as scores > 4.5 s are at risk for falls. Additionally pts 4 square step test time of *** indicates pt is at inc risk for falls as times >10.4s are considered pts identified as fallers and scores > 15 s indicate pt is as risk for multiple falls. Pts miniBEST test score of ***/28 dec as compared to age matched norms of ***/28 indcating impaired balance when compared to age matched peers. Pt reports an ABC score of *** indicating a dec confidence in their self perceived balance. Scores < 67% place pt at an inc risk of falls.    Pt would benefit from skilled physical therapy to improve  overall QOL inclusive of, strength, coordination, balance, endurance and functional mobility in the home and in the community as well as reduce their fall risk for improved safety.       Goals  STG (4 weeks)   Improve TUG score of *** by 4.14 seconds indicating meaningful improvement in fall risk   Improve FGA score of *** by 2 indicating meaningful improvement in fall risk   Improve 5x STS score of *** by 2.3 seconds indicating meaningful improvement in fall risk and power generation   Increase 6MWT of *** by 82 ft indicating meaningful change in aerobic endurance    Patient will improve gait speed to *** to improve (household/community distance ambulation) and reduce pts fall risk  Pt will improve 3m bwd walk time of *** by 1.57s (MDC) to  demonstrate an improvement in balance and a reduction in fall risk  Pt will improve 4 square step test time of *** by 4.6s (MDC) to demonstrate an improvement in balance and a reduction in pts fall risk  Improve miniBEST score of *** by 2 indicating improvement in fall risk     LTG (8 weeks)   Improve TUG score to <13.5 seconds indicating meaningful improvement in fall risk   Improve FGA score to > 23/30 indicating meaningful improvement in fall risk   Improve/Reduce 5x STS score to < 11.1 seconds seconds indicating meaningful improvement in fall risk and power generation   Improve 6MWT score to *** better align with age related norms demonstrating improvement in aerobic capacity and endurance  Patient will improve gait speed to *** to improve functional community ambulation and reduce pts fall risk  Pt will improve 3m bwd walk time of ***  to *** to demonstrate an improvement in balance and a reduction in fall risk  Pt will improve 4 square step test time of ***  to *** to demonstrate an improvement in balance and a reduction in fall risk  Improve miniBEST score of *** by 4 MCID indicating meaningful improvement in fall risk       Plan  Patient would benefit from: skilled physical therapy    Planned therapy interventions: balance, gait training, graded activity, graded exercise, home exercise program, neuromuscular re-education, patient/caregiver education, strengthening, stretching, therapeutic activities and therapeutic exercise    Frequency: 2x week  Duration in weeks: 8  Plan of Care beginning date: 4/8/2025  Plan of Care expiration date: 6/3/2025  Treatment plan discussed with: patient    Subjective Evaluation    History of Present Illness  Mechanism of injury: Per epic chart review:  Repeat CT of the head on 2/24 showed redemonstration of evolving infarct in the right occipital and posterior temporal lobes, now extending to the right parietal lobe. There are small areas of hemorrhagic transformation in these  these regions of infarction. There is substantial cytotoxic edema in these regions with sulcal effacement and on the right as well as and effacement of the portions of the right lateral ventricle. Additional small acute infarct in the inferior aspect of right cerebellar hemisphere, small infarct in the right thalamus. OACIS was consulted for GOC and patient remained treatment focused.    Objective           Balance Test         6 Minute Walk Test (ft):         2 Minute Walk Test (ft):         Gait Speed (m/s):         5x Sit To Stand (s):         TUG (s)         FGA  3 m bwd walk         Mini BEST         4 square step test             MCTSIB Number of Seconds   Feet Together, Eyes Open    Feet Together, Eyes Closed    Feet Together, Eyes Open Foam    Feet Together, Eyes Closed Foam        Coordination Left Right   Heel To Shin     Finger To Nose     Rapid Alternating Movement     UE     LE         Sensation Left Right   Kinesthesia     Light Touch     Sharp/Dull     2 Point Discrimination         Muscle Tone Left Right   Modified Brenda Scale     Hamstring     Gastroc     Quad         Manual Muscle Testing - Hip Left Right   Flexion {#:78174} {#:36303}   Extension {#:20710} {#:88137}   Abduction {#:30469} {#:82056}   External Rotation {#:74530} {#:30889}     Manual Muscle Testing - Knee Left Right   Flexion {#:35742} {#:65803}   Extension {#:99720} {#:70098}     Manual Muscle Testing - Ankle Left Right   Doriflexion {#:07445} {#:78565}   Plantarflexion {#:39643} {#:35249}        Transfers    Sit To Stand {Transfers:20174}   W/C To Bed {Transfers:78956}   Sit To Supine {Transfers:37139}   Roll {Transfers:14153}                  Short Term Goal Expiration Date:(4/6/25)  Long Term Goal Expiration Date: (6/3/25)  POC Expiration Date: (6/3/25)        POC expires Unit limit Auth Expiration date PT/OT/ST + Visit Limit?   6/3/25 *** *** ***                           Visit/Unit Tracking  AUTH Status:  Date               *** Used               Remaining                     Precautions ***       Manuals                                        Neuro Re-Ed                                                                 Ther Ex                                                                        Ther Activity                        Gait Training                        Modalities

## 2025-04-08 NOTE — LETTER
2025    Luis E Mcdaniels MD  [address]  [address]  [facility] [address]    Patient: Victor Manuel Mcmanus   YOB: 1950   Date of Visit: 2025     Encounter Diagnosis     ICD-10-CM    1. Occipitotemporal infarct, acute (HCC)  I63.9           Dear Dr. Luis E Mcdaniels MD:    Thank you for your recent referral of Victor Manuel Mcmanus. Please review the attached evaluation summary from Victor Manuel's recent visit.     Please verify that you agree with the plan of care by signing the attached order.     If you have any questions or concerns, please do not hesitate to call.     I sincerely appreciate the opportunity to share in the care of one of your patients and hope to have another opportunity to work with you in the near future.       Sincerely,    Oma Chowdary, PT      Referring Provider:      I certify that I have read the below Plan of Care and certify the need for these services furnished under this plan of treatment while under my care.                    Luis E Mcdaniels MD  [address]  [address]  [facility] [place] [address]  Via Fax: [phone]          PT Evaluation     Today's date: 2025  Patient name: Victor Manuel Mcmanus  : 1950  MRN: [id number]  Referring provider: No ref. provider found  Dx:   Encounter Diagnosis     ICD-10-CM    1. Occipitotemporal infarct, acute (HCC)  I63.9           Start Time: 0845  Stop Time: 0930  Total time in clinic (min): 45 minutes    Assessment  Impairments: abnormal coordination, abnormal gait, abnormal muscle tone, activity intolerance, impaired balance, impaired physical strength, lacks appropriate home exercise program, participation limitations, activity limitations and endurance    Assessment details: Pt is a 75 year old male presenting to [facility] physical therapy for an initial evaluation secondary to CVA. Pt presents to clinic with gait and balance impairments inclusive of left side neglect and visual impairment impacting pts ability to safely  navigate community and environment, MH of shoe lift on RLE and R knee fx and utilizes SPC assistive device for mobility (in the home/in the community) .      Additionally pt demonstrates strength deficits as seen with MMT scores of <5/5 and 5x STS time of 22.28 demonstrating dec LE strength and power as compared to age matched norms.  Pt is at inc risk for falls based on their FGA score of 20/30  with scores < 20/30 at inc risk for spontaneous falls and scores < 23/30 considered at risk for falls. pts TUG time of 16.0 s w SPC (verbal cues for direction of ambulation and inc time to locate cones) puts them at inc risk for falls as scores > 13.5 are considered at risk for falls.Pts 3m bwd walking time of 9,53 s  indicates pt is at inc risk for falls as scores > 4.5 s are at risk for falls.  6mwt and 10mwt to be complete NV for endurance and gait speed testing. Pt was provided edu on left sided awareness and spatial awareness when navigating environments after stroke.    Pt would benefit from skilled physical therapy to improve  overall QOL inclusive of, strength, coordination, balance, endurance and functional mobility in the home and in the community as well as reduce their fall risk for improved safety.       Goals  STG (4 weeks)   Improve TUG score of 16 by 2 seconds indicating meaningful improvement in fall risk   Improve FGA score of 20 by 2 indicating meaningful improvement in fall risk   Improve 5x STS score of 22.28 by 2.3 seconds indicating meaningful improvement in fall risk and power generation  Complete 6mwt for assessment of  aerobic endurance    Pt will improve 3m bwd walk time of 9.53 by 1.57s (MDC) to demonstrate an improvement in balance and a reduction in fall risk      LTG (8 weeks)   Improve TUG score to <13.5 seconds indicating meaningful improvement in fall risk   Improve FGA score to > 23/30 indicating meaningful improvement in fall risk   Improve/Reduce 5x STS score to < 18 seconds seconds  indicating meaningful improvement in fall risk and power generation   Pt will improve 3m bwd walk time of 9.53  to <6 to demonstrate an improvement in balance and a reduction in fall risk        Plan  Patient would benefit from: skilled physical therapy    Planned therapy interventions: balance, gait training, graded activity, graded exercise, home exercise program, neuromuscular re-education, patient/caregiver education, strengthening, stretching, therapeutic activities and therapeutic exercise    Frequency: 2x week  Duration in weeks: 8  Plan of Care beginning date: 4/8/2025  Plan of Care expiration date: 6/3/2025  Treatment plan discussed with: patient      Subjective Evaluation    History of Present Illness  Mechanism of injury: Per epic chart review:  Repeat CT of the head on 2/24 showed redemonstration of evolving infarct in the right occipital and posterior temporal lobes, now extending to the right parietal lobe. There are small areas of hemorrhagic transformation in these these regions of infarction. There is substantial cytotoxic edema in these regions with sulcal effacement and on the right as well as and effacement of the portions of the right lateral ventricle. Additional small acute infarct in the inferior aspect of right cerebellar hemisphere, small infarct in the right thalamus. OACIS was consulted for GOC and patient remained treatment focused.    Per pt report:  He is doing okay in terms of his walking and balance but has to take his time. Pt pt is currently using an SPC self reported about 60% of the time ut was not using it pretty much at all before. Pt reports he has been able to use the stairs with a railing. Pt has been walking on various surfaces (grass side walk gravel) with PT when he was in the hospital and has been doing well with that. Wife reports left sided inattention  Patient Goals  Patient goals for therapy: improved balance  Patient goal: improved independence  Pain  No pain  reported        Objective           Balance Test         6 Minute Walk Test (ft):         2 Minute Walk Test (ft):         Gait Speed (m/s):         5x Sit To Stand (s): 22.28 ue        TUG (s) 16 spc        FGA  3 m bwd walk 20  9.53        Mini BEST         4 square step test               Sensation Left Right   Kinesthesia     Light Touch WNL WNL   Sharp/Dull     2 Point Discrimination         Muscle Tone Left Right   Modified Brenda Scale     Hamstring 0 0   Gastroc 0 0   Quad         Manual Muscle Testing - Hip Left Right   Flexion 4 4   Extension     Abduction     External Rotation       Manual Muscle Testing - Knee Left Right   Flexion 5 5   Extension 5 5     Manual Muscle Testing - Ankle Left Right   Doriflexion 5 5   Plantarflexion            Short Term Goal Expiration Date:(4/6/25)  Long Term Goal Expiration Date: (6/3/25)  POC Expiration Date: (6/3/25)        POC expires Unit limit Auth Expiration date PT/OT/ST + Visit Limit?   6/3/25 6 neuro 12/31/25 BOMN                           Visit/Unit Tracking  AUTH Status:  Date IE             None ON every 10 Used 1/10              Remaining                     Precautions hx CVA, afb, HTN DM2       Manuals                                        Neuro Re-Ed  Edu on left sided inattention/ neglect after CVA                                                               Ther Ex                                                                        Ther Activity                        Gait Training                        Modalities

## 2025-04-15 ENCOUNTER — APPOINTMENT (OUTPATIENT)
Dept: SPEECH THERAPY | Facility: CLINIC | Age: 75
End: 2025-04-15
Payer: MEDICARE

## 2025-04-15 ENCOUNTER — OFFICE VISIT (OUTPATIENT)
Dept: PHYSICAL THERAPY | Facility: CLINIC | Age: 75
End: 2025-04-15
Payer: MEDICARE

## 2025-04-15 DIAGNOSIS — I63.9 OCCIPITOTEMPORAL INFARCT, ACUTE (HCC): Primary | ICD-10-CM

## 2025-04-15 PROCEDURE — 97112 NEUROMUSCULAR REEDUCATION: CPT

## 2025-04-15 PROCEDURE — 97110 THERAPEUTIC EXERCISES: CPT

## 2025-04-15 NOTE — PROGRESS NOTES
Daily Note     Today's date: 4/15/2025  Patient name: Victor Manuel Mcmanus  : 1950  MRN: [id number]  Referring provider: No ref. provider found  Dx: No diagnosis found.    Start Time: 08  Stop Time: 08  Total time in clinic (min): 38 minutes    Subjective: pt reports he is doing well today, no sig changes to report      Objective: See treatment diary below      Assessment: Tolerated treatment well. Patient would benefit from continued PT pts 6mwt distance of 702 feet is dec as compared to age matched norms, during 6mwt pt veers close to the right wall. 10mwt speed of 0.62 m/s indicates pt is a community Ambulator and is at inc risk for falls based on speed being <1 m/s. Exercises focus on attention to spatial awareness. Weaving through cones too easy of an exercise for pt, hurdles added between cones. Inc challenge observed. Pt requires cues to step over hurdles based on colors to maintain exercise. Agility ladder side stepping inc difficulty to L w cues to be aware of L foot stepping on ladder. Max cues for víctor shuffle pattern however min to mod carryover when cues removed. Pt cues fr independence on remembering pattern such as what do you think should be next, should you use right or left foot.      Plan: Continue per plan of care.      Short Term Goal Expiration Date:(25)  Long Term Goal Expiration Date: (6/3/25)  POC Expiration Date: (6/3/25)        POC expires Unit limit Auth Expiration date PT/OT/ST + Visit Limit?   6/3/25 6 neuro 25 BOMN                           Visit/Unit Tracking  AUTH Status:  Date IE 4/15            None ON every 10 Used 1/10 2/10             Remaining                     Precautions hx CVA, afb, HTN DM2       Manuals  4/15                                      Neuro Re-Ed  Edu on left sided inattention/ neglect after CVA 10mwt: 0.63 m/s w SPC      Multi directional stepping  4 cone weaving  X1 lap (easy)  Add hurdles between each cone  X3 laps      Agility ladder  SOLO  Lateral laps x2    Jered shuffle x3 laps                                              Ther Ex  6mwt 702 feet w SPC                                                                      Ther Activity                        Gait Training                        Modalities

## 2025-04-16 ENCOUNTER — OFFICE VISIT (OUTPATIENT)
Dept: OCCUPATIONAL THERAPY | Facility: CLINIC | Age: 75
End: 2025-04-16
Payer: MEDICARE

## 2025-04-16 ENCOUNTER — OFFICE VISIT (OUTPATIENT)
Dept: PHYSICAL THERAPY | Facility: CLINIC | Age: 75
End: 2025-04-16
Payer: MEDICARE

## 2025-04-16 DIAGNOSIS — I63.9 OCCIPITOTEMPORAL INFARCT, ACUTE (HCC): Primary | ICD-10-CM

## 2025-04-16 DIAGNOSIS — I63.9 CEREBROVASCULAR ACCIDENT (CVA), UNSPECIFIED MECHANISM (HCC): Primary | ICD-10-CM

## 2025-04-16 PROCEDURE — 97530 THERAPEUTIC ACTIVITIES: CPT

## 2025-04-16 PROCEDURE — 97112 NEUROMUSCULAR REEDUCATION: CPT

## 2025-04-16 NOTE — PROGRESS NOTES
"Occupational Therapy Daily Note:    Today's date: 2025  Patient name: Victor Manuel Mcmanus  : 1950  MRN: [id number]  Referring provider: No ref. provider found  Dx:   Encounter Diagnosis   Name Primary?    Cerebrovascular accident (CVA), unspecified mechanism (HCC) Yes                    POC expires Unit limit Auth Expiration date PT/OT/ST + Visit Limit?   6/3/25 N/A 25 BOMN                           Visit/Unit Tracking  AUTH Status:  Date             BOMN Used 1 2             Remaining  7 6                 Duration in weeks: 8 weeks  Plan of care beginning date: 25  Plan of care expiration date: 6/3/25  PN #1 due: 25    Precautions: L side inattention,       Subjective: \"Where are the tissues? I guess if you're telling me on the table they are on the left\"    Objective: See treatment below.     Thera Act:  Started session with Pt completing bells cancellation test with goals to be added based on scoring.    NEW GOALS: - Pt will be able to cancel out  bells on bells cancelation test demo improved scanning to the L side past midline for increased ability to participate in daily routine.  - Pt will be able to complete puzzle activity with minimal to no verbal cues demo improved scanning to L side to complete ADLs and IADLs.      [place] Cancellation Test     Total # of bells circled:   Time taken to complete test:  4 min 16 seconds    Number of Omissions:  1 in column 1  1 in column 2  1 in column 3  1 in column 4  3 in column 5  4 in column 6  5 in column 7     Tracking pattern:Scanning on R side top to bottom; after cue scattered scanning pattern    Verbal cue required        Pt seated at table completing scanning velcro card board on table. With cards positioned on L side, pt instructed to  card and then place onto matching spot on card. Pt required moderate verbal cues when having to  new card to scan to L to find next card. Pt with good ability to scan entire board " when looking for match.    Pt then completed 12 piece puzzle with maximal assist and cues to scan to L side for puzzle pieces as well as orient and complete puzzle correctly.     Assessment: Tolerated treatment well. Pt to continue working on tasks with scanning to L side for increased safety and participation. Patient would benefit from continued skilled OT.    Plan: Continued skilled OT per POC

## 2025-04-16 NOTE — PROGRESS NOTES
Daily Note     Today's date: 2025  Patient name: Victor Manuel Mcmanus  : 1950  MRN: [id number]  Referring provider: No ref. provider found  Dx:   Encounter Diagnosis     ICD-10-CM    1. Occipitotemporal infarct, acute (HCC)  I63.9           Start Time: 0800  Stop Time: 0845  Total time in clinic (min): 45 minutes    Subjective: pt reports no new changes since yesterdays visit      Objective: See treatment diary below      Assessment: Tolerated treatment well. Patient would benefit from continued PT vertical line drawn on center of TM w cues for one foot on either side of the line to prevent lateral veering due to neglect/Visual deficits. Pt reports UE fatigue following TM, NV possibly slow speed and cue unilateral UE use for in LE work on TM. Session today focused on environmental scanning for safe navigation. W blaze pods in a square on floor pt did well w locating pod, difficulty w also navigating over pool noodles while approaching pods, cues to pay attention to foot placement. Cog task of right vs left added w pods, good understanding of task, inc time to scan to left.      Plan: Continue per plan of care.      Short Term Goal Expiration Date:(25)  Long Term Goal Expiration Date: (6/3/25)  POC Expiration Date: (6/3/25)        POC expires Unit limit Auth Expiration date PT/OT/ST + Visit Limit?   6/3/25 6 neuro 25 BOMN                           Visit/Unit Tracking  AUTH Status:  Date IE 4/15 4/16           None PN every 10 Used 1/10 2/10 3/10            Remaining                     Precautions hx CVA, afb, HTN DM2, left side neglect        Manuals  4/15 4/16                                     Neuro Re-Ed  Edu on left sided inattention/ neglect after CVA 10mwt: 0.63 m/s w SPC      Multi directional stepping  4 cone weaving  X1 lap (easy)  Add hurdles between each cone  X3 laps 5 cone weaving  hurdles between each cone  X3 laps    Lateral weaving through cones no hurdles  X3 laps     Agility ladder   SOLO Lateral laps x2    Jered shuffle x3 laps      TM   SOLO  1.2 mph  5% incline added at 5 min  Vertical line on TM for central alignment  X10 min total     Environment navigation   SOLO  4 blaze pods in square 3 pool noodle obstacles  10 taps x 3 rounds    SOLO  2 pod on mirror (to pts left), one on 8'' step one on floor  1 min 30s round  Cog task right light right hand or foot ble light let hand or foot 1min 30'' x2                             Ther Ex  6mwt 702 feet w SPC                                                                      Ther Activity                        Gait Training                        Modalities

## 2025-04-22 ENCOUNTER — OFFICE VISIT (OUTPATIENT)
Dept: OCCUPATIONAL THERAPY | Facility: CLINIC | Age: 75
End: 2025-04-22
Payer: MEDICARE

## 2025-04-22 ENCOUNTER — OFFICE VISIT (OUTPATIENT)
Dept: PHYSICAL THERAPY | Facility: CLINIC | Age: 75
End: 2025-04-22
Payer: MEDICARE

## 2025-04-22 DIAGNOSIS — I63.9 OCCIPITOTEMPORAL INFARCT, ACUTE (HCC): Primary | ICD-10-CM

## 2025-04-22 DIAGNOSIS — I63.9 CEREBROVASCULAR ACCIDENT (CVA), UNSPECIFIED MECHANISM (HCC): Primary | ICD-10-CM

## 2025-04-22 PROCEDURE — 97116 GAIT TRAINING THERAPY: CPT | Performed by: PHYSICAL THERAPIST

## 2025-04-22 PROCEDURE — 97112 NEUROMUSCULAR REEDUCATION: CPT | Performed by: PHYSICAL THERAPIST

## 2025-04-22 PROCEDURE — 97530 THERAPEUTIC ACTIVITIES: CPT

## 2025-04-22 NOTE — PROGRESS NOTES
"Daily Note     Today's date: 2025  Patient name: Victor Manuel Mcmanus  : 1950  MRN: [id number]  Referring provider: No ref. provider found  Dx:   Encounter Diagnosis     ICD-10-CM    1. Occipitotemporal infarct, acute (HCC)  I63.9           Start Time: 08  Stop Time: 09  Total time in clinic (min): 45 minutes    Subjective: I have a slight headache today, but otherwise I feel fine. \"Things don't make sense tome, I want them to and I am trying but it's hard to put together.\"      Objective: See treatment diary below      Assessment: Tolerated treatment well. Patient demonstrated fatigue post treatment and would benefit from continued PT  Difficulty navigating and avoiding objects in environment improved with repeated practice, however needed repeat verbal instructions and clarifications throughout activity. Notes increased UE stiffness with treadmill, attempted walking without UE support with good affect and challenge. Cueing to avoid shuffling needed consistently throughout and towards end of treadmill use due to fatigue.   Notes right knee with stiffness from prior incident has to keep ER with STS, but utilizes left LE well demonstrating good strength but limited with endurance.   Plan: Continue per plan of care.  Progress treatment as tolerated.       Short Term Goal Expiration Date:(25)  Long Term Goal Expiration Date: (6/3/25)  POC Expiration Date: (6/3/25)        POC expires Unit limit Auth Expiration date PT/OT/ST + Visit Limit?   6/3/25 6 neuro 25 BOMN                           Visit/Unit Tracking  AUTH Status:  Date IE 4/15 4/16 04/22          None PN every 10 Used 1/10 2/10 3/10 4/10           Remaining                     Precautions hx CVA, afb, HTN DM2, left side neglect        Manuals  4/15 4/16 04/22                                    Neuro Re-Ed  Edu on left sided inattention/ neglect after CVA 10mwt: 0.63 m/s w SPC  STS: x 14    Multi directional stepping  4 cone weaving  X1 lap " (easy)  Add hurdles between each cone  X3 laps 5 cone weaving  hurdles between each cone  X3 laps    Lateral weaving through cones no hurdles  X3 laps     Agility ladder  SOLO Lateral laps x2    Jered shuffle x3 laps      TM   SOLO  1.2 mph  5% incline added at 5 min  Vertical line on TM for central alignment  X10 min total     Environment navigation   SOLO  4 blaze pods in square 3 pool noodle obstacles  10 taps x 3 rounds    SOLO  2 pod on mirror (to pts left), one on 8'' step one on floor  1 min 30s round  Cog task right light right hand or foot ble light let hand or foot 1min 30'' x2 Solo   With blaze pods (4 -blue) around obstacles pool noodles 6in step and hurdles x 3 trials   1: 3'  2: 7  3: 9                            Ther Ex  6mwt 702 feet w SPC                                                                      Ther Activity    Max cues to navigate environment with physical and cognitive fatigue                    Gait Training        Treadmill     Solo  B/L UE  1.2 mph 5.0% incline  With focus on heel toe gait pattern as well as increased step height and length   X 5 min     With education for technique and safe use of treadmill     W/o B/L UE  0.8-1.1mph  X 5 min     Focus on inc. Step height and length, heel toe gait pattern     HR 96  O2 97%             Modalities

## 2025-04-22 NOTE — PROGRESS NOTES
Occupational Therapy Daily Note:    Today's date: 2025  Patient name: Victor Manuel Mcmanus  : 1950  MRN: [id number]  Referring provider: No ref. provider found  Dx:   Encounter Diagnosis   Name Primary?    Cerebrovascular accident (CVA), unspecified mechanism (HCC) Yes                    POC expires Unit limit Auth Expiration date PT/OT/ST + Visit Limit?   6/3/25 N/A 25 BOMN                           Visit/Unit Tracking  AUTH Status:  Date            BOMN Used 1 2 3            Remaining  7 6 5                Duration in weeks: 8 weeks  Plan of care beginning date: 25  Plan of care expiration date: 6/3/25  PN #1 due: 25    Precautions: L side inattention,       Subjective: I can see the white here, but not down here. Depth perception what's the matter with my eyes.     Objective: See treatment below.     Thera Act: Pt started session with 2 min timed dual tasking activity, working on a 12 piece puzzle with the pieces placed on the left to encourage scanning towards left side. Noted pt sweeping for pieces when therapist provided verbal cues of remaining pieces placed on their left. After 2', pt switched to IQ puzzle to manipulate pieces to match image #25. Pt struggled to see the image on the card and comprehend the objective of the puzzle, required mod verbal cue and min assist form therapist to initiate the objective of the puzzle. Pt having difficulty locating #25 on guide sheet, with therapist using sticky notes to isolate proper design to look at and decreased visual clutter and L side visual attention. Pt completed 12 piece puzzle with mod verbal cues to direct piece placement and visual scanning as well as orientation with visualization of animal in head. Struggled with visual comprehension. Pt commented on their hesitation in regards to depth perception and comprehension of what they were seeing.    Assessment: Tolerated treatment well. Therapist noting decreased left visual  attention throughout with both activities, with pt commenting on depth perception, however, pt stating he was unsure if there is still difficulty with that currently. Therapist not noting depth difficulties as much during current task. Patient would benefit from continued skilled OT.    Plan: Continued skilled OT per POC

## 2025-04-23 ENCOUNTER — APPOINTMENT (OUTPATIENT)
Dept: SPEECH THERAPY | Facility: CLINIC | Age: 75
End: 2025-04-23
Payer: MEDICARE

## 2025-04-23 ENCOUNTER — OFFICE VISIT (OUTPATIENT)
Dept: PHYSICAL THERAPY | Facility: CLINIC | Age: 75
End: 2025-04-23
Payer: MEDICARE

## 2025-04-23 ENCOUNTER — OFFICE VISIT (OUTPATIENT)
Dept: OCCUPATIONAL THERAPY | Facility: CLINIC | Age: 75
End: 2025-04-23
Payer: MEDICARE

## 2025-04-23 DIAGNOSIS — I63.9 OCCIPITOTEMPORAL INFARCT, ACUTE (HCC): Primary | ICD-10-CM

## 2025-04-23 DIAGNOSIS — I63.9 CEREBROVASCULAR ACCIDENT (CVA), UNSPECIFIED MECHANISM (HCC): Primary | ICD-10-CM

## 2025-04-23 PROCEDURE — 97112 NEUROMUSCULAR REEDUCATION: CPT

## 2025-04-23 NOTE — PROGRESS NOTES
"Occupational Therapy Daily Note:    Today's date: 2025  Patient name: Victor Manuel Mcmanus  : 1950  MRN: [id number]  Referring provider: Luis E Mcdaniels MD  Dx:   Encounter Diagnosis   Name Primary?    Cerebrovascular accident (CVA), unspecified mechanism (HCC) Yes       Start Time: 930  Stop Time: 1015  Total time in clinic (min): 45 minutes    POC expires Unit limit Auth Expiration date PT/OT/ST + Visit Limit?   6/3/25 N/A 25 BOMN                           Visit/Unit Tracking  AUTH Status:  Date           BOMN Used 1 2 3 4           Remaining  7 6 5 4               Duration in weeks: 8 weeks  Plan of care beginning date: 25  Plan of care expiration date: 6/3/25  PN #1 due: 25    Precautions: L side inattention,         Subjective: \"I keep looking at the traffic signs on the road to bring them into focus.\"    Objective: See treatment below.     Theract: Activity focusing on saccades and scanning into left visual field engaging head turns and visual AROM to left improving safety and ADL/IADL mngt.  Matrix cubes positioned tabletop midline and to left, pt instructed to identify shape on card positioned on slant board midline of pt moving matching shape cube to empty cube.    Pt required 1:1 assist throughout task to track shapes on card and scan environment to identify shapes on matrix.  Visual aid provided to block shapes on card improving focus on target shape and provide parameters around matrix increasing scanning abilities. Improvement noted with use of visual aides.     Assessment: Tolerated treatment well attending to task.  Pt required cues >75% of time to scan full matrix to identify/locate shapes.  Improvement noted with time and repetition of task.  Patient would benefit from continued skilled OT.    Plan: Continued skilled OT per POC    "

## 2025-04-23 NOTE — PROGRESS NOTES
Daily Note     Today's date: 2025  Patient name: Victor Manuel Mcmanus  : 1950  MRN: [id number]  Referring provider: No ref. provider found  Dx:   Encounter Diagnosis     ICD-10-CM    1. Occipitotemporal infarct, acute (HCC)  I63.9                        Subjective: no new reports. States he might feel a little lightheaded at times, but seems like it is random      Objective: See treatment diary below      Assessment: Tolerated treatment well. Patient demonstrated fatigue post treatment and would benefit from continued PT. TM was unavailable during session, session focused on visual scanning and environment negotiation. Difficulty with turning head and visual scanning appearing more difficulty to the L side. Fatigue at L foot with catching toes on agility ladder with fatigue. Continue to progress as tolerated.    Plan: Continue per plan of care.  Progress treatment as tolerated.       Short Term Goal Expiration Date:(25)  Long Term Goal Expiration Date: (6/3/25)  POC Expiration Date: (6/3/25)        POC expires Unit limit Auth Expiration date PT/OT/ST + Visit Limit?   6/3/25 6 neuro 25 BOMN                           Visit/Unit Tracking  AUTH Status:  Date IE 4/15 4/16 04/22 4/23         None PN every 10 Used 1/10 2/10 3/10 4/10 5/10          Remaining                     Precautions hx CVA, afb, HTN DM2, left side neglect        Manuals  4/15 4/16 04/22 4/23                                   Neuro Re-Ed  Edu on left sided inattention/ neglect after CVA 10mwt: 0.63 m/s w SPC  STS: x 14 STS x 10 reps    Multi directional stepping  4 cone weaving  X1 lap (easy)  Add hurdles between each cone  X3 laps 5 cone weaving  hurdles between each cone  X3 laps    Lateral weaving through cones no hurdles  X3 laps  SOLO  5 cone weaving lateral x 4 laps   Agility ladder  SOLO Lateral laps x2    Jered shuffle x3 laps   SOLO  Lateral x 3 laps    Icky suffle x 3 laps   TM   SOLO  1.2 mph  5% incline added at 5  min  Vertical line on TM for central alignment  X10 min total  TM unavailable during session   Environment navigation   SOLO  4 blaze pods in square 3 pool noodle obstacles  10 taps x 3 rounds    SOLO  2 pod on mirror (to pts left), one on 8'' step one on floor  1 min 30s round  Cog task right light right hand or foot ble light let hand or foot 1min 30'' x2 Solo   With blaze pods (4 -blue) around obstacles pool noodles 6in step and hurdles x 3 trials   1: 3'  2: 7  3: 9 SOLO  4 pods, 1 minute with pool noodle obstacles  1st trial: 6 hits  2nd trial: 5 hits  3rd trial: 5 hits    Set up in square, focus on turning, 1 minute intervals  1st trial: 15  2nd trial: 14  3rd trial: 15                           Ther Ex  6mwt 702 feet w SPC                                                                      Ther Activity    Max cues to navigate environment with physical and cognitive fatigue                    Gait Training        Treadmill     Solo  B/L UE  1.2 mph 5.0% incline  With focus on heel toe gait pattern as well as increased step height and length   X 5 min     With education for technique and safe use of treadmill     W/o B/L UE  0.8-1.1mph  X 5 min     Focus on inc. Step height and length, heel toe gait pattern     HR 96  O2 97%             Modalities

## 2025-04-29 ENCOUNTER — APPOINTMENT (OUTPATIENT)
Dept: SPEECH THERAPY | Facility: CLINIC | Age: 75
End: 2025-04-29
Payer: MEDICARE

## 2025-04-29 ENCOUNTER — OFFICE VISIT (OUTPATIENT)
Dept: OCCUPATIONAL THERAPY | Facility: CLINIC | Age: 75
End: 2025-04-29
Payer: MEDICARE

## 2025-04-29 ENCOUNTER — OFFICE VISIT (OUTPATIENT)
Dept: PHYSICAL THERAPY | Facility: CLINIC | Age: 75
End: 2025-04-29
Payer: MEDICARE

## 2025-04-29 DIAGNOSIS — I63.9 OCCIPITOTEMPORAL INFARCT, ACUTE (HCC): Primary | ICD-10-CM

## 2025-04-29 DIAGNOSIS — I63.9 CEREBROVASCULAR ACCIDENT (CVA), UNSPECIFIED MECHANISM (HCC): Primary | ICD-10-CM

## 2025-04-29 PROCEDURE — 97112 NEUROMUSCULAR REEDUCATION: CPT

## 2025-04-29 NOTE — PROGRESS NOTES
Daily Note     Today's date: 2025  Patient name: Victor Manuel Mcmanus  : 1950  MRN: [id number]  Referring provider: No ref. provider found  Dx:   Encounter Diagnosis     ICD-10-CM    1. Occipitotemporal infarct, acute (HCC)  I63.9           Start Time: 08  Stop Time: 0845  Total time in clinic (min): 40 minutes    Subjective: pt expresses frustration w his deficits following his stroke      Objective: See treatment diary below      Assessment: Tolerated treatment well. Patient would benefit from continued PT bean bags added on TM for observation of environment and reactive stepping. Most errors observed w bean bags on the left side of the TM however pt demonstrated within session improvement as exercise progressed. Correct color selection w side stepping for HT required bilaterally. W 4 square exercise pt does a good job of  locating pods and demonstrates good left looking.. difficulty w bwd stepping over hurdles      Plan: Continue per plan of care.      Short Term Goal Expiration Date:(25)  Long Term Goal Expiration Date: (6/3/25)  POC Expiration Date: (6/3/25)        POC expires Unit limit Auth Expiration date PT/OT/ST + Visit Limit?   6/3/25 6 neuro 25 BOMN                           Visit/Unit Tracking  AUTH Status:  Date IE 4/15 4/16 04/22 4/23 4/29        None PN every 10 Used 1/10 2/10 3/10 4/10 5/10 6/10         Remaining                     Precautions hx CVA, afb, HTN DM2, left side neglect        Manuals 4/29 4/15 4/16 04/22 4/23                                   Neuro Re-Ed   10mwt: 0.63 m/s w SPC  STS: x 14 STS x 10 reps    Multi directional stepping SOLo  4 square 6'' natalie set up w blaze pods    10 taps x2 rounds 4 cone weaving  X1 lap (easy)  Add hurdles between each cone  X3 laps 5 cone weaving  hurdles between each cone  X3 laps    Lateral weaving through cones no hurdles  X3 laps  SOLO  5 cone weaving lateral x 4 laps   Agility ladder  SOLO Lateral laps x2    Jered shuffle x3 laps    SOLO  Lateral x 3 laps    Icky suffle x 3 laps   TM SOLO  1.2 mph bean bags for reactive balance x10 min  SOLO  1.2 mph  5% incline added at 5 min  Vertical line on TM for central alignment  X10 min total  TM unavailable during session   Environment navigation Side stepping w blaze pods select correct color bean bag to match pods  X10      HT to identify number of fingers x 4 laps  SOLO  4 blaze pods in square 3 pool noodle obstacles  10 taps x 3 rounds    SOLO  2 pod on mirror (to pts left), one on 8'' step one on floor  1 min 30s round  Cog task right light right hand or foot ble light let hand or foot 1min 30'' x2 Solo   With blaze pods (4 -blue) around obstacles pool noodles 6in step and hurdles x 3 trials   1: 3'  2: 7  3: 9 SOLO  4 pods, 1 minute with pool noodle obstacles  1st trial: 6 hits  2nd trial: 5 hits  3rd trial: 5 hits    Set up in square, focus on turning, 1 minute intervals  1st trial: 15  2nd trial: 14  3rd trial: 15                           Ther Ex  6mwt 702 feet w SPC                                                                      Ther Activity    Max cues to navigate environment with physical and cognitive fatigue                    Gait Training        Treadmill     Solo  B/L UE  1.2 mph 5.0% incline  With focus on heel toe gait pattern as well as increased step height and length   X 5 min     With education for technique and safe use of treadmill     W/o B/L UE  0.8-1.1mph  X 5 min     Focus on inc. Step height and length, heel toe gait pattern     HR 96  O2 97%             Modalities

## 2025-04-29 NOTE — PROGRESS NOTES
"Occupational Therapy Daily Note:    Today's date: 2025  Patient name: Victor Manuel Mcmanus  : 1950  MRN: [id number]  Referring provider: No ref. provider found  Dx:   Encounter Diagnosis   Name Primary?    Cerebrovascular accident (CVA), unspecified mechanism (HCC) Yes          POC expires Unit limit Auth Expiration date PT/OT/ST + Visit Limit?   6/3/25 N/A 25 BOMN                           Visit/Unit Tracking  AUTH Status:  Date          BOMN Used 1 2 3 4 5          Remaining  7 6 5 4 3              Duration in weeks: 8 weeks  Plan of care beginning date: 25  Plan of care expiration date: 6/3/25  PN #1 due: 25    Precautions: L side inattention,        Subjective: \"I look across and then down when I'm working on word search puzzles.\"    Objective: See treatment below.     Neuro Re-ed: Auditory processing of 3 word sets with verbalization of smallest items followed by retrieving word sets from clutter cards scattered tabletop focusing on L side attention, scanning, memory and attention improving safety and ADL/IADL mngt.  Pt encouraged to scan L>R to locate words w/frequent verbal and physical cues for scanning carryover into peripheral fields.     Assessment: Tolerated treatment well. Pt frequently distracted by clutter of words on table requiring redirection for verbalization of smallest word. Improvement in scanning environment w/time at task.   Patient would benefit from continued skilled OT.    Plan: Continued skilled OT per POC    "

## 2025-04-30 ENCOUNTER — OFFICE VISIT (OUTPATIENT)
Dept: PHYSICAL THERAPY | Facility: CLINIC | Age: 75
End: 2025-04-30
Payer: MEDICARE

## 2025-04-30 ENCOUNTER — OFFICE VISIT (OUTPATIENT)
Dept: OCCUPATIONAL THERAPY | Facility: CLINIC | Age: 75
End: 2025-04-30
Payer: MEDICARE

## 2025-04-30 ENCOUNTER — APPOINTMENT (OUTPATIENT)
Dept: SPEECH THERAPY | Facility: CLINIC | Age: 75
End: 2025-04-30
Payer: MEDICARE

## 2025-04-30 DIAGNOSIS — I63.9 OCCIPITOTEMPORAL INFARCT, ACUTE (HCC): Primary | ICD-10-CM

## 2025-04-30 DIAGNOSIS — I63.9 CEREBROVASCULAR ACCIDENT (CVA), UNSPECIFIED MECHANISM (HCC): Primary | ICD-10-CM

## 2025-04-30 PROCEDURE — 97530 THERAPEUTIC ACTIVITIES: CPT

## 2025-04-30 PROCEDURE — 97112 NEUROMUSCULAR REEDUCATION: CPT

## 2025-04-30 NOTE — PROGRESS NOTES
"Occupational Therapy Daily Note:    Today's date: 2025  Patient name: Victor Manuel Mcmanus  : 1950  MRN: [id number]  Referring provider: Luis E Mcdaniels MD  Dx:   Encounter Diagnosis   Name Primary?    Cerebrovascular accident (CVA), unspecified mechanism (HCC) Yes            POC expires Unit limit Auth Expiration date PT/OT/ST + Visit Limit?   6/3/25 N/A 25 BOMN                           Visit/Unit Tracking  AUTH Status:  Date         BOMN Used 1 2 3 4 5 6         Remaining  7 6 5 4 3 2             Duration in weeks: 8 weeks  Plan of care beginning date: 25  Plan of care expiration date: 6/3/25  PN #1 due: 25    Precautions: L side inattention,        Subjective: \"You helped me cheat a little without even realizing, you let it slip\"    Objective: See treatment below.     There act: Pt seated at table working on scanning to L side and increased awareness to the L. With Ispy dig in pieces scattered on table and 3 cards on L side. Pt instructed to look to L and start at top card scanning from L to R in proper reading pattern and then finding pieces in order in clutter on table. Once Pt finding top card, then pieces scattered to L and right side with card in middle and then 3rd card on R side with all pieces on L side. Pt with occasional and moderate cues to scan to L to look at card for next object in the beginning, however then able to scan and find with minimal verbal cues.     Assessment: Tolerated treatment well. Pt frequently distracted by clutter of objects on table requiring redirection Improvement in scanning environment w/time at task. Pt with good scanning to top L, with noted difficulty in bottom L quadrant of table.  Patient would benefit from continued skilled OT.    Plan: Continued skilled OT per POC    "

## 2025-04-30 NOTE — PROGRESS NOTES
Daily Note     Today's date: 2025  Patient name: Victor Manuel Mcmanus  : 1950  MRN: [id number]  Referring provider: No ref. provider found  Dx:   Encounter Diagnosis     ICD-10-CM    1. Occipitotemporal infarct, acute (HCC)  I63.9           Start Time: 0845  Stop Time: 0930  Total time in clinic (min): 45 minutes    Subjective: pt reprots he is doing well this morning      Objective: See treatment diary below      Assessment: Tolerated treatment well. Patient would benefit from continued PT playing card board incorporated to TM ambulation today w card board on the left for inc left sided attention and scanning the left side of the environment. Pt able to inc speed and tap inc  number of pods  however inc errors of stepping on pool noodles observed. Final round focused on accuracy rather than speed. CGA initially provided for tandem gait, able to perform w/o assist by final lap minimal errors, pt utilizes external rotation of LE for inc IZZY w tandem gait.      Plan: Continue per plan of care.      Short Term Goal Expiration Date:(25)  Long Term Goal Expiration Date: (6/3/25)  POC Expiration Date: (6/3/25)        POC expires Unit limit Auth Expiration date PT/OT/ST + Visit Limit?   6/3/25 6 neuro 25 BOMN                           Visit/Unit Tracking  AUTH Status:  Date IE 4/15 4/16 04/22 4/23 4/29 4/30       None PN every 10 Used 1/10 2/10 3/10 4/10 5/10 6/10 7/10        Remaining                     Precautions hx CVA, afb, HTN DM2, left side neglect        Manuals                                    Neuro Re-Ed     STS: x 14 STS x 10 reps    Multi directional stepping SOLo  4 square 6'' natalie set up w blaze pods    10 taps x2 rounds SOLO  Fwd bwd walking w blaze pods and matching color dots  1 min 30'' x2 rounds 5 cone weaving  hurdles between each cone  X3 laps    Lateral weaving through cones no hurdles  X3 laps  SOLO  5 cone weaving lateral x 4 laps   Agility ladder      SOLO  Lateral x 3 laps    Icky suffle x 3 laps   TM SOLO  1.2 mph bean bags for reactive balance x10 min SOLO  1.5 mph-1.2 mph  W playing cards board on left for inc left attention  X10 min SOLO  1.2 mph  5% incline added at 5 min  Vertical line on TM for central alignment  X10 min total  TM unavailable during session   Environment navigation Side stepping w blaze pods select correct color bean bag to match pods  X10      HT to identify number of fingers x 4 laps SOLO pool noodles dispersed 1 min 30'' blaze pod cycle  3 trials  SOLO  4 blaze pods in square 3 pool noodle obstacles  10 taps x 3 rounds    SOLO  2 pod on mirror (to pts left), one on 8'' step one on floor  1 min 30s round  Cog task right light right hand or foot ble light let hand or foot 1min 30'' x2 Solo   With blaze pods (4 -blue) around obstacles pool noodles 6in step and hurdles x 3 trials   1: 3'  2: 7  3: 9 SOLO  4 pods, 1 minute with pool noodle obstacles  1st trial: 6 hits  2nd trial: 5 hits  3rd trial: 5 hits    Set up in square, focus on turning, 1 minute intervals  1st trial: 15  2nd trial: 14  3rd trial: 15   tandem  SOLO  X3 laps      hurdles  Solo  8''  X2 laps fwd              Ther Ex                                                                        Ther Activity    Max cues to navigate environment with physical and cognitive fatigue                    Gait Training        Treadmill     Solo  B/L UE  1.2 mph 5.0% incline  With focus on heel toe gait pattern as well as increased step height and length   X 5 min     With education for technique and safe use of treadmill     W/o B/L UE  0.8-1.1mph  X 5 min     Focus on inc. Step height and length, heel toe gait pattern     HR 96  O2 97%             Modalities

## 2025-05-06 ENCOUNTER — EVALUATION (OUTPATIENT)
Dept: PHYSICAL THERAPY | Facility: CLINIC | Age: 75
End: 2025-05-06
Payer: MEDICARE

## 2025-05-06 ENCOUNTER — OFFICE VISIT (OUTPATIENT)
Dept: OCCUPATIONAL THERAPY | Facility: CLINIC | Age: 75
End: 2025-05-06
Payer: MEDICARE

## 2025-05-06 DIAGNOSIS — I63.9 OCCIPITOTEMPORAL INFARCT, ACUTE (HCC): Primary | ICD-10-CM

## 2025-05-06 DIAGNOSIS — I63.9 CEREBROVASCULAR ACCIDENT (CVA), UNSPECIFIED MECHANISM (HCC): Primary | ICD-10-CM

## 2025-05-06 PROCEDURE — 97110 THERAPEUTIC EXERCISES: CPT

## 2025-05-06 PROCEDURE — 97112 NEUROMUSCULAR REEDUCATION: CPT

## 2025-05-06 NOTE — PROGRESS NOTES
"Occupational Therapy Daily Note:    Today's date: 2025  Patient name: Victor Manuel Mcmanus  : 1950  MRN: [id number]  Referring provider: No ref. provider found  Dx:   Encounter Diagnosis   Name Primary?    Cerebrovascular accident (CVA), unspecified mechanism (HCC) Yes            POC expires Unit limit Auth Expiration date PT/OT/ST + Visit Limit?   6/3/25 N/A 25 BOMN                           Visit/Unit Tracking  AUTH Status:  Date        BOMN Used 1 2 3 4 5 6 7        Remaining  7 6 5 4 3 2 1            Duration in weeks: 8 weeks  Plan of care beginning date: 25  Plan of care expiration date: 6/3/25  PN #1 due: 25    Precautions: L side inattention,        Subjective: \"I need to work on my neuro plasticity.\"    Objective: See treatment below. Pt participate din skilled OT session this date focusing on cognition, place keeping, multi step direction follow, L visual scanning, neuro/ocular re-ed and saccades to increase safety and performance with ADL/IADLs.     NRME: While standing, pt participated in multi step decoding peg board activity, with peg board on the back of mirror and pegs placed on table top to their left. Using the grid placed next to peg board, pt decoded using the answer key place on the table to their left to identify the corresponding color, decoding right to left. Pt demonstrated max difficulty in place keeping. Pt required max verbal cues to look left and task step facilitation. Therapist decreased visual clutter for place keeping purposes for increased pt success, pt continued to required 90% of paper occluded to maintain attention with max A.     Assessment: Tolerated treatment well. Pt had max difficulty with place keeping and L side attention. Patient would benefit from continued skilled OT.    Plan: Continued skilled OT per POC    Session and note reviewed by Tanesha JACOBS prior to signing.      "

## 2025-05-06 NOTE — PROGRESS NOTES
Daily Note     Today's date: 2025  Patient name: Victor Manuel Mcmanus  : 1950  MRN: [id number]  Referring provider: No ref. provider found  Dx:   Encounter Diagnosis     ICD-10-CM    1. Occipitotemporal infarct, acute (HCC)  I63.9           Start Time: 0800  Stop Time: 0845  Total time in clinic (min): 45 minutes    Subjective: pt reports he is frustrated with progress and would like to be making progress quicker towards independence especially w driving. Pts wife repots she has noticed improvements. She is not having to guide pt around as much and he is gaining some independence.       Objective: See treatment diary below      Assessment: Tolerated treatment well. Patient would benefit from continued PT Pt has attended 8 physical therapy sessions including IE and is demonstrating progress towards goals.  Pt has met 5/5 STG and is progressing towards LTG at this time Pt has improved their 6mwt distance from 702 feet to 808 ft indicating improved aerobic capacity and activity tolerance. Pts gait speed has improved from 0.64 m/s to 0.94 m/s indicating pt is a community Ambulator however is still at risk for falls based on their speed being < 1 m/s. Pts TUG time has improved from 16 s w SPC to 11.54 s indicating pt is not at inc risk for falls based on this test as socres > 13.5 s are considered at risk. pts FGA score has improved from 20/30 to 24/30 reducing pts fall risk . Pts 5x STS tests has improved from 22.28 s to 12.69 s indicating improved LE strength and power however is still less than age matched norms. Pts 3m bwd walking time has improved from 9.53 to 5.22 s  indicating pt is at inc risk for falls as scores > 4.5 s are at risk for falls. Additionally pts 4 square step test time of 12.85 s (one step on canes) indicating pt is at inc risk for falls as times >10.4s or stepping on canes errors are considered pts identified as fallers and scores > 15 s indicate pt is as risk for multiple falls.   Due to  frustration w progress, pt was provided resources fro [place] stroke support group and discussed resources for pt. Pt would continue to benefit from skilled physical therapy to improve overall QOL inclusive of, strength, coordination, balance, endurance and functional mobility in the home and in the community as well as reduce their fall risk for improved safety.             Assessment  Impairments: abnormal coordination, abnormal gait, abnormal muscle tone, activity intolerance, impaired balance, impaired physical strength, lacks appropriate home exercise program, participation limitations, activity limitations and endurance    Assessment details: Pt is a 75 year old male presenting to [facility] physical therapy for an initial evaluation secondary to CVA. Pt presents to clinic with gait and balance impairments inclusive of left side neglect and visual impairment impacting pts ability to safely navigate community and environment, MH of shoe lift on RLE and R knee fx and utilizes SPC assistive device for mobility (in the home/in the community) .      Additionally pt demonstrates strength deficits as seen with MMT scores of <5/5 and 5x STS time of 22.28 demonstrating dec LE strength and power as compared to age matched norms.  Pt is at inc risk for falls based on their FGA score of 20/30  with scores < 20/30 at inc risk for spontaneous falls and scores < 23/30 considered at risk for falls. pts TUG time of 16.0 s w SPC (verbal cues for direction of ambulation and inc time to locate cones) puts them at inc risk for falls as scores > 13.5 are considered at risk for falls.Pts 3m bwd walking time of 9,53 s  indicates pt is at inc risk for falls as scores > 4.5 s are at risk for falls.  6mwt and 10mwt to be complete NV for endurance and gait speed testing. Pt was provided edu on left sided awareness and spatial awareness when navigating environments after stroke.    Pt would benefit from skilled physical therapy to improve   overall QOL inclusive of, strength, coordination, balance, endurance and functional mobility in the home and in the community as well as reduce their fall risk for improved safety.       Goals  STG (4 weeks)   Improve TUG score of 16 by 2 seconds indicating meaningful improvement in fall risk MET  Improve FGA score of 20 by 2 indicating meaningful improvement in fall risk MET  Improve 5x STS score of 22.28 by 2.3 seconds indicating meaningful improvement in fall risk and power generation MET  Complete 6mwt for assessment of  aerobic endurance  MET  Pt will improve 3m bwd walk time of 9.53 by 1.57s (MDC) to demonstrate an improvement in balance and a reduction in fall risk MET      LTG (8 weeks)   Improve TUG score to <13.5 seconds indicating meaningful improvement in fall risk   Improve FGA score to > 23/30 indicating meaningful improvement in fall risk   Improve/Reduce 5x STS score to < 18 seconds seconds indicating meaningful improvement in fall risk and power generation   Pt will improve 3m bwd walk time of 9.53  to <6 to demonstrate an improvement in balance and a reduction in fall risk        Plan  Patient would benefit from: skilled physical therapy    Planned therapy interventions: balance, gait training, graded activity, graded exercise, home exercise program, neuromuscular re-education, patient/caregiver education, strengthening, stretching, therapeutic activities and therapeutic exercise    Frequency: 2x week  Duration in weeks: 8  Plan of Care beginning date: 4/8/2025  Plan of Care expiration date: 6/3/2025  Treatment plan discussed with: patient      Subjective Evaluation    History of Present Illness  Mechanism of injury: Per epic chart review:  Repeat CT of the head on 2/24 showed redemonstration of evolving infarct in the right occipital and posterior temporal lobes, now extending to the right parietal lobe. There are small areas of hemorrhagic transformation in these these regions of infarction.  There is substantial cytotoxic edema in these regions with sulcal effacement and on the right as well as and effacement of the portions of the right lateral ventricle. Additional small acute infarct in the inferior aspect of right cerebellar hemisphere, small infarct in the right thalamus. [facility] was consulted for GOC and patient remained treatment focused.    Per pt report:  He is doing okay in terms of his walking and balance but has to take his time. Pt pt is currently using an SPC self reported about 60% of the time ut was not using it pretty much at all before. Pt reports he has been able to use the stairs with a railing. Pt has been walking on various surfaces (grass side walk gravel) with PT when he was in the hospital and has been doing well with that. Wife reports left sided inattention  Patient Goals  Patient goals for therapy: improved balance  Patient goal: improved independence  Pain  No pain reported        Objective           Balance Test IE 5/6       6 Minute Walk Test (ft): 702 808       2 Minute Walk Test (ft):         Gait Speed (m/s): 0.62 0.94       5x Sit To Stand (s): 22.28 ue        TUG (s) 16 spc 11.54 no AD       FGA  3 m bwd walk 20  9.53 24  5.22       Mini BEST         4 square step test  12.85s 1 step on canes            Short Term Goal Expiration Date:(5/6/25)  Long Term Goal Expiration Date: (6/3/25)  POC Expiration Date: (6/3/25)        POC expires Unit limit Auth Expiration date PT/OT/ST + Visit Limit?   6/3/25 6 neuro 12/31/25 BOMN                           Visit/Unit Tracking  AUTH Status:  Date IE 4/15 4/16 04/22 4/23 4/29 4/30 5/6      None PN every 10 Used 1/10 2/10 3/10 4/10 5/10 6/10 7/10 8/10       Remaining                     Precautions hx CVA, afb, HTN DM2, left side neglect        Manuals 4/29 4/30 5/6 04/22 4/23                                   Neuro Re-Ed    Edu on resources   Additional eu on neuro plasticity  alternate route analogy  Fga  4 squre step  test  Tug  10mwt  3m bwd walk STS: x 14 STS x 10 reps    Multi directional stepping SOLo  4 square 6'' natalie set up w blaze pods    10 taps x2 rounds SOLO  Fwd bwd walking w blaze pods and matching color dots  1 min 30'' x2 rounds   SOLO  5 cone weaving lateral x 4 laps   Agility ladder     SOLO  Lateral x 3 laps    Icky suffle x 3 laps   TM SOLO  1.2 mph bean bags for reactive balance x10 min SOLO  1.5 mph-1.2 mph  W playing cards board on left for inc left attention  X10 min   TM unavailable during session   Environment navigation Side stepping w blaze pods select correct color bean bag to match pods  X10      HT to identify number of fingers x 4 laps SOLO pool noodles dispersed 1 min 30'' blaze pod cycle  3 trials   Solo   With blaze pods (4 -blue) around obstacles pool noodles 6in step and hurdles x 3 trials   1: 3'  2: 7  3: 9 SOLO  4 pods, 1 minute with pool noodle obstacles  1st trial: 6 hits  2nd trial: 5 hits  3rd trial: 5 hits    Set up in square, focus on turning, 1 minute intervals  1st trial: 15  2nd trial: 14  3rd trial: 15   tandem  SOLO  X3 laps      hurdles  Solo  8''  X2 laps fwd              Ther Ex   5x sts  6mwt                                                                     Ther Activity    Max cues to navigate environment with physical and cognitive fatigue                    Gait Training        Treadmill     Solo  B/L UE  1.2 mph 5.0% incline  With focus on heel toe gait pattern as well as increased step height and length   X 5 min     With education for technique and safe use of treadmill     W/o B/L UE  0.8-1.1mph  X 5 min     Focus on inc. Step height and length, heel toe gait pattern     HR 96  O2 97%             Modalities

## 2025-05-08 ENCOUNTER — OFFICE VISIT (OUTPATIENT)
Dept: OCCUPATIONAL THERAPY | Facility: CLINIC | Age: 75
End: 2025-05-08
Payer: MEDICARE

## 2025-05-08 ENCOUNTER — OFFICE VISIT (OUTPATIENT)
Dept: PHYSICAL THERAPY | Facility: CLINIC | Age: 75
End: 2025-05-08
Payer: MEDICARE

## 2025-05-08 DIAGNOSIS — I63.9 CEREBROVASCULAR ACCIDENT (CVA), UNSPECIFIED MECHANISM (HCC): Primary | ICD-10-CM

## 2025-05-08 DIAGNOSIS — I63.9 OCCIPITOTEMPORAL INFARCT, ACUTE (HCC): Primary | ICD-10-CM

## 2025-05-08 PROCEDURE — 97112 NEUROMUSCULAR REEDUCATION: CPT

## 2025-05-08 NOTE — PROGRESS NOTES
Daily Note     Today's date: 2025  Patient name: Victor Manuel Mcmanus  : 1950  MRN: [id number]  Referring provider: No ref. provider found  Dx:   Encounter Diagnosis     ICD-10-CM    1. Occipitotemporal infarct, acute (HCC)  I63.9           Start Time: 0800  Stop Time: 0845  Total time in clinic (min): 45 minutes    Subjective: pt reprots he is doing well today      Objective: See treatment diary below      Assessment: Tolerated treatment well. Patient would benefit from continued PT inc speed on TM w reactive balance today 80% success rate avoiding bean bead w all bean bags missed being on the left side of pt visual field. HT/HN to encourage environmental scanning w equal ability to look right and left. Natalie/cone weaving did not pose enough of a challenge for pt, progressed to 4 square stepping which was difficult for pt. Pt did well w locating blaze pod however struggled to locate pod and lso focuse on avoiding hurdles simultaneously.       Plan: Continue per plan of care.      Short Term Goal Expiration Date:(25)  Long Term Goal Expiration Date: (6/3/25)  POC Expiration Date: (6/3/25)        POC expires Unit limit Auth Expiration date PT/OT/ST + Visit Limit?   6/3/25 6 neuro 25 BOMN                           Visit/Unit Tracking  AUTH Status:  Date IE 4/15 4/16 04/22 4/23 4/29 4/30 5/6 5/8     None PN every 10 Used 1/10 2/10 3/10 4/10 5/10 6/10 7/10 8/10 1/10      Remaining                     Precautions hx CVA, afb, HTN DM2, left side neglect        Manuals  5 5                                   Neuro Re-Ed    Edu on resources   Additional eu on neuro plasticity  alternate route analogy  Fga  4 squre step test  Tug  10mwt  3m bwd walk  STS x 10 reps    Multi directional stepping SOLo  4 square 6'' natalie set up w blaze pods    10 taps x2 rounds SOLO  Fwd bwd walking w blaze pods and matching color dots  1 min 30'' x2 rounds  Solo  5 cone weave w 6'' hurdles between cones  X3  laps    4 square 6'' natalie set up w blaze pods    10 taps x2 rounds SOLO  5 cone weaving lateral x 4 laps   Agility ladder     SOLO  Lateral x 3 laps    Icky suffle x 3 laps   TM SOLO  1.2 mph bean bags for reactive balance x10 min SOLO  1.5 mph-1.2 mph  W playing cards board on left for inc left attention  X10 min  No SOLO  1.5 mph bean bags for reactive balance x10 min TM unavailable during session   Environment navigation Side stepping w blaze pods select correct color bean bag to match pods  X10      HT to identify number of fingers x 4 laps SOLO pool noodles dispersed 1 min 30'' blaze pod cycle  3 trials   Walking HT/HN  X3 laps SOLO  4 pods, 1 minute with pool noodle obstacles  1st trial: 6 hits  2nd trial: 5 hits  3rd trial: 5 hits    Set up in square, focus on turning, 1 minute intervals  1st trial: 15  2nd trial: 14  3rd trial: 15   tandem  SOLO  X3 laps      hurdles  Solo  8''  X2 laps fwd      Dual task    Side step ball bounce  X3 laps    Ther Ex   5x sts  6mwt                                                                     Ther Activity                        Gait Training        Treadmill                 Modalities

## 2025-05-08 NOTE — PROGRESS NOTES
"Occupational Therapy Daily Note:    Today's date: 2025  Patient name: Victor Manuel Mcmanus  : 1950  MRN: [id number]  Referring provider: No ref. provider found  Dx:   Encounter Diagnosis   Name Primary?    Cerebrovascular accident (CVA), unspecified mechanism (HCC) Yes             POC expires Unit limit Auth Expiration date PT/OT/ST + Visit Limit?   6/3/25 N/A 25 BOMN                           Visit/Unit Tracking  AUTH Status:  Date       BOMN Used 1 2 3 4 5 6 7 8       Remaining  7 6 5 4 3 2 1 0           Duration in weeks: 8 weeks  Plan of care beginning date: 25  Plan of care expiration date: 6/3/25  PN #1 due: 25    Precautions: L side inattention,       Subjective: \"I see in chunks sometimes, I miss words.\"    Objective: See treatment below.       Neuro Re-ed:  Pt engaged in reading comprehension task focusing on scanning L>R, horizontal saccades and left side attention w/additional cog demands of mental math. Pt instructed to read word problems L>R solving those word problems by adding or subtracting using decimals to derive at answer.  Pt able to mentally compute math problems.  Pt required occluder when reading to decrease clutter and improve focus/attention to task  as well as visual cues >50% of time to start reading to left beginning at the sentence.  Pt frequently missed chunks of sentence.      Assessment: Tolerated treatment well. Pt became frustrated at times and confused stating \"he didn't understand what he was suppose to do\".   Encouraged pt to take time scanning all the way to left to begin reading sentence, improvement noted.  Pt wife stated he is starting to read more at home.  [facility]  follow up with opthalmologic on .  Patient would benefit from continued skilled OT.    Plan: Continued skilled OT per POC    "

## 2025-05-13 ENCOUNTER — EVALUATION (OUTPATIENT)
Dept: OCCUPATIONAL THERAPY | Facility: CLINIC | Age: 75
End: 2025-05-13
Payer: MEDICARE

## 2025-05-13 ENCOUNTER — OFFICE VISIT (OUTPATIENT)
Dept: PHYSICAL THERAPY | Facility: CLINIC | Age: 75
End: 2025-05-13
Payer: MEDICARE

## 2025-05-13 DIAGNOSIS — I63.9 OCCIPITOTEMPORAL INFARCT, ACUTE (HCC): Primary | ICD-10-CM

## 2025-05-13 DIAGNOSIS — I63.9 CEREBROVASCULAR ACCIDENT (CVA), UNSPECIFIED MECHANISM (HCC): Primary | ICD-10-CM

## 2025-05-13 PROCEDURE — 97112 NEUROMUSCULAR REEDUCATION: CPT

## 2025-05-13 PROCEDURE — 97530 THERAPEUTIC ACTIVITIES: CPT

## 2025-05-13 NOTE — PROGRESS NOTES
Daily Note     Today's date: 2025  Patient name: Victor Manuel Mcmanus  : 1950  MRN: [id number]  Referring provider: Luis E Mcdaniels MD  Dx:   Encounter Diagnosis     ICD-10-CM    1. Occipitotemporal infarct, acute (HCC)  I63.9           Start Time: 0806  Stop Time: 0845  Total time in clinic (min): 39 minutes    Subjective: pt reports a HA this morning however denies any other concerns outside of HA. HA pain 3/10      Objective: See treatment diary below      Assessment: Tolerated treatment well. Patient would benefit from continued PT pt demonstrates inc veering to the L on the TM today at start of session, center line added fro visual cue of orientation w improved ambulation following. BP assessed due to HA at 132/76. No nystagmus present no abnormal test of skew. Pt demonstrates difficulty w visual scanning to the left on card board however demonstrates good balance and multi directional stepping around cones. On first lap of patterned agility ladder PT verbalizes pattern, on subsequent laps PT cues pt to perform indep. Pt required to verbalize to maintain pattern      Plan: Continue per plan of care.      Short Term Goal Expiration Date:(25)  Long Term Goal Expiration Date: (6/3/25)  POC Expiration Date: (6/3/25)        POC expires Unit limit Auth Expiration date PT/OT/ST + Visit Limit?   6/3/25 6 neuro 25 BOMN                           Visit/Unit Tracking  AUTH Status:  Date IE 4/15 4/16 04/22 4/23 4/29 4/30 5/6 58 5.13    None PN every 10 Used 1/10 2/10 3/10 4/10 5/10 6/10 7/10 8/10 1/10 2/10     Remaining                     Precautions hx CVA, afb, HTN DM2, left side neglect        Manuals  5/6 5/8 5/13                                   Neuro Re-Ed    Edu on resources   Additional eu on neuro plasticity  alternate route analogy  Fga  4 squre step test  Tug  10mwt  3m bwd walk     Multi directional stepping SOLo  4 square 6'' natalie set up w blaze pods    10 taps x2 rounds SOLO  Fwd  bwd walking w blaze pods and matching color dots  1 min 30'' x2 rounds  Solo  5 cone weave w 6'' hurdles between cones  X3 laps    4 square 6'' natalie set up w blaze pods    10 taps x2 rounds 4 cones weave laterally w card bard at the end, turn over L shoulder for care placement x5 laps   Agility ladder     SOLO  Lateral 2 feet per square  X1 lap    Lateral movement pattern fwd side bwd  X2 laps   TM SOLO  1.2 mph bean bags for reactive balance x10 min SOLO  1.5 mph-1.2 mph  W playing cards board on left for inc left attention  X10 min  No SOLO  1.5 mph bean bags for reactive balance x10 min SOLO  1.5 mph chalk line down center of TM for midline orientation  X5 min   Environment navigation Side stepping w blaze pods select correct color bean bag to match pods  X10      HT to identify number of fingers x 4 laps SOLO pool noodles dispersed 1 min 30'' blaze pod cycle  3 trials   Walking HT/HN  X3 laps    tandem  SOLO  X3 laps      hurdles  Solo  8''  X2 laps fwd      Dual task    Side step ball bounce  X3 laps    Ther Ex   5x sts  6mwt                                                                     Ther Activity                        Gait Training        Treadmill                 Modalities

## 2025-05-13 NOTE — PROGRESS NOTES
OCCUPATIONAL THERAPY #1 PROGRESS NOTE:    5/13/25  Victor Manuel Mcmanus  1950  [id number]  Luis E Mcdaniels MD   Diagnosis ICD-10-CM Associated Orders   1. Cerebrovascular accident (CVA), unspecified mechanism (HCC)  I63.9           Assessment/Plan    SKILLED ANALYSIS:  Pt is a 75 y.o. male referred to Occupational Therapy s/p Cerebrovascular accident (CVA), unspecified mechanism (HCC) [I63.9].  Pt participated in skilled OT progress note and following formalized testing as well as clinical observation, Pt presents with the following areas of deficit:  B/L eye teaming, slowed scanning to L side, L side awareness/inattention, L visual field deficits with inabilities to see visual target until at MIDLINE during two person confrontation test, decreased insight/awareness into L visual field deficits. Pt reports to have improvements in double vision since his stroke. He states that prior to his stroke he would get double vision every once in a while due to his diabetes and he would close one eye for a bit for it to go away. He continues to report blurriness even with glasses's donned.   He states that since the stroke he has still done that but feels his biggest concern is his depth perception. Pt completed vision testing this date with overall minimal deficits noted in convergence and ROM, however significant deficits on the L side during visual field testing. Pt with noted improvements in bells cancellation this date with overall improved scanning strategies and ability to find 27/35.Pt continues to be educated on scanning using eyes and head when walking to the L side to avoid injury. Pt will benefit from skilled Occupational Therapy services 2x/week for 4 more weeks with focus on there act, there ex, and neuro re-ed.     Todays Treatment: Pt seated at table working through connect the dot activity with small numbers. Pt good ability to understand task, however rushing through and not looking at numbers, often connecting  to wrong numbers. Pt instructed to scan sheet and find next letter with therapist then assisting to connect. Pt with difficulty scanning midline and to the L, maximal verbal cues to complete 1-75 dots.    POC expires Unit limit Auth Expiration date PT/OT/ST + Visit Limit?   6/3/25 N/A 12/31/25 BOMN                           Visit/Unit Tracking  AUTH Status:  Date 4/8 4/16 4/22 4/23 4/29 4/30 5/6 5/8 5/13 PN     BOMN Used 1 2 3 4 5 6 7 8 9      Remaining  7 6 5 4 3 2 1 0 0          Duration in weeks: 8 weeks  Plan of care beginning date: 4/8/25  Plan of care expiration date: 6/3/25  RE-EVAL: 6/3/25      Precautions: L side inattention,     GOALS    Short Term Goals (4 weeks):  Cognition  - Pt will demo ability to participate in dual tasking/divided attention task with 50% accuracy in multimodal environment to simulate return to life roles PROGRESSING  - Pt will increase auditory processing speed for note taking/direction following requiring repetition of directions <50% of the time for improved role performance and engagement in salient tasks PROGRESSING    Vision  - Pt will demo with G carryover of compensatory and visual search strategies for visual tracking/gazing in LEFT peripheral visual fields to improve functional performance in salient tasks and safety PROGRESSING  - Pt will improve visual fixation to > 10 seconds on one target to L visual fields demo improved focus, attention, and visual memory  PROGRESSING  - Pt will demo with improved abilities to identify visual cues in L  peripheral visual field x 50% for increased safety in multimodal environments PROGRESSING  - Pt will demo with improved figure ground visual perceptual skills x 25% for improved visual attention when driving, identifying items in busy environments in home environment, or to improved abilities locating items in grocery stores PROGRESSING  - Pt will be able to cancel out 22/35 bells on bells cancelation test demo improved scanning to the  "L side past midline for increased ability to participate in daily routine. GOAL MET  - Pt will be able to complete puzzle activity with minimal to no verbal cues demo improved scanning to L side to complete ADLs and IADLs. PROGRESSING    Long Term Goals (8 weeks):   - Pt will be independent with vision and motor HEP focusing on visual tracking, saccades, and pursuits to improve safety for functional mobility in new environments PROGRESSING  - Pt will increase visual attention to task for 40+ minutes in multimodal environment to improve visual skills for ADLs and IADLs PROGRESSING   - Pt will Improve visual pursuits and tracking from right to left visual fields for improved daily performance and completion of salient tasks PROGRESSING  - Pt will be able to cancel out 30/35 bells on bells cancelation test demo improved scanning to the L side past midline for increased ability to participate in daily routine. PROGRESSING    Subjective    PATIENT GOAL: \"To be independent and walking better\"    Patient-Specific Functional Scale   Task is scored 0 (unable to perform activity) to 10 (ability to perform activity independently)    Activity Date: Date:   Vision/scanning     2.   Endurance     3.        4.            HISTORY OF PRESENT ILLNESS:     Pt is a 75 y.o. male who was referred to Occupational Therapy s/p Cerebrovascular accident (CVA), unspecified mechanism (HCC) [I63.9]. Pt was in the ER and was in the hospital for about a month and then completed inpatient rehab at [facility]. Pt has discharged from hospital on 3/22 and reports that since being home he has been working to slowly get back to doing things at home as he used to. Pt is currently getting treated for a burn on his upper thigh that he sustained from when in the hospital. Pt reports that he did have some UE weakness but he has been completing a strengthening HEP with 5# wts at home daily. Pt reports some depth perception issues in regards to his vision but " overall does not feel that his vision has changed much. He wears glasses's and reports that even prior to the stroke he would sometimes have double vision when he would have to close one eye. Pt does feel that things are a little harder when on the L side or specifically when trying to sit down to make sure the chair is there prior to sitting.   Pt lives with his wife and is able to complete ADLs independently. Pt will do minimal cooking and cleaning at home. Pt does not report many differences, however therapist noting some repetition of conversation throughout evaluation. Pt wife reports that she oversees things such as medication mgt and walking different places to ensure safety throughout the house. Pt and wife complete finace mgmt together but wife mainly completes. 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     PMH:   Past Medical History:   Diagnosis Date    Arthritis     Atrial fibrillation (HCC)     Coronary artery disease (CAD) excluded 2013    COVID-19 2022    COVID-19 vaccination refused 2022    COVID-19 virus infection 2022    Diabetes 1.5, 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HCC)     Essential hypertension 3/7/2016    Hepatitis C virus infection resolved after antiviral drug therapy 10/17/2019    Hepatitis C, acute     HTN (hypertension)     Influenza     Neuropathy     Pneumonia 2022    Tick bite of left wrist 2021    Viral illness 2021       Past Surgical Hx:   Past Surgical History:   Procedure Laterality Date    CYSTOSTOMY W/ BLADDER DILATION      basket extraction of calculus    FEMUR FRACTURE SURGERY      LITHOTRIPSY      PATELLA FRACTURE SURGERY      STONE ANALYSIS (HISTORICAL)            Pain Levels  Restin    With Activity:  0        Objective    IMPAIRMENTS SECTION            Assessments    Vision         Comments                                        VISION SCREEN         GLASSES (prescription)          Symptoms Include blurred vision     Last Eye Exam 1  year ago           Visual Acuity (near) R eye  20/40   L eye 20/40    Binocularity (near) orthophoria    Binocularity (far) orthophoria    Convergence  NO Diplopia reported  DNT   Accommodation NO Blurriness/loss of focus Reported  DNT   Radu String             Alignment  misalignment    Unable to report a pattern during testing   DNT    Mobility             Pursuits smooth in all planes            Saccades smooth in all planes, slowed when scanning to the L side and upper L quadrant            Range of Motion WFL    Two person confrontation test Unable to see target until MIDLINE in all planes Impaired               [place] Cancellation Test     Total # of bells circled: 27/35  Time taken to complete test:  3 min 23 seconds    Number of Omissions:  2 in column 1  3 in column 2  4 in column 3  5 in column 4  5 in column 5  5 in column 6  3 in column 7     Tracking pattern:Scanning on R side top to bottom and right to left; scattered scanning pattern towards the end          Improved       PLANNED THERAPY INTERVENTIONS:  Internal and external memory aides  Multimatrix for saccades/ visual clutter/attention  Hypersensitivity strategies education  Multi-modal environment  Sustained/alternating/divided attention  Tracking tube  Oculomotor control:  saccades, con/divergence  Conv./div. Dynamic tasks  Work stations with timed transitions  Temporal Awareness: Organize the Hour activities  Memory and mental manipulation  Auditory processing with immediate recall  Memory retention with immediate and delayed recall  Edu on cog/vision apps  Dalia alford scanning sheets

## 2025-05-14 ENCOUNTER — OFFICE VISIT (OUTPATIENT)
Dept: OCCUPATIONAL THERAPY | Facility: CLINIC | Age: 75
End: 2025-05-14
Payer: MEDICARE

## 2025-05-14 ENCOUNTER — OFFICE VISIT (OUTPATIENT)
Dept: PHYSICAL THERAPY | Facility: CLINIC | Age: 75
End: 2025-05-14
Payer: MEDICARE

## 2025-05-14 DIAGNOSIS — I63.9 OCCIPITOTEMPORAL INFARCT, ACUTE (HCC): Primary | ICD-10-CM

## 2025-05-14 DIAGNOSIS — I63.9 CEREBROVASCULAR ACCIDENT (CVA), UNSPECIFIED MECHANISM (HCC): Primary | ICD-10-CM

## 2025-05-14 PROCEDURE — 97112 NEUROMUSCULAR REEDUCATION: CPT

## 2025-05-14 NOTE — PROGRESS NOTES
Daily Note     Today's date: 2025  Patient name: Victor Manuel Mcmanus  : 1950  MRN: [id number]  Referring provider: No ref. provider found  Dx:   Encounter Diagnosis     ICD-10-CM    1. Occipitotemporal infarct, acute (HCC)  I63.9           Start Time: 08  Stop Time: 930  Total time in clinic (min): 45 minutes    Subjective: pt reports he is feeling better today as compared to HA yesterday      Objective: See treatment diary below      Assessment: Tolerated treatment well. Patient would benefit from continued PT attempted no UE on TM pt unable to sustain this at speed >0.6 mph at this time however does well at slowed speed. Emphasis of session on environmental awareness and left sided attention. Pt missed >50% of ball bounces withn passed on his left side indicating I can't see the ball. PT continues to provide cues to keep eye on ball for full duration from Pts hands to floor for inc left side awareness. Inc % of blocks placed on pts right for scanning      Plan: Continue per plan of care.      Short Term Goal Expiration Date:(25)  Long Term Goal Expiration Date: (6/3/25)  POC Expiration Date: (6/3/25)        POC expires Unit limit Auth Expiration date PT/OT/ST + Visit Limit?   6/3/25 6 neuro 25 BOMN                           Visit/Unit Tracking  AUTH Status:  Date IE 4/15 4/16 04/22 4/23 4/29 4/30 5 58 5.13    None PN every 10 Used 1/10 2/10 3/10 4/10 5/10 6/10 7/10 8/10 1/10 2/10 3/10    Remaining                     Precautions hx CVA, afb, HTN DM2, left side neglect        Manuals  5 5                                   Neuro Re-Ed    Edu on resources   Additional eu on neuro plasticity  alternate route analogy  Fga  4 squre step test  Tug  10mwt  3m bwd walk     Multi directional stepping Solo  Hex ring step in correct color based on pod  2 min x2 rounds SOLO  Fwd bwd walking w blaze pods and matching color dots  1 min 30'' x2 rounds  Solo  5 cone weave w 6'' hurdles  between cones  X3 laps    4 square 6'' natalie set up w blaze pods    10 taps x2 rounds 4 cones weave laterally w card bard at the end, turn over L shoulder for care placement x5 laps   Agility ladder     SOLO  Lateral 2 feet per square  X1 lap    Lateral movement pattern fwd side bwd  X2 laps   TM SOLO  1.6 mph x8 min BUE use    0.6 x2 min no UE SOLO  1.5 mph-1.2 mph  W playing cards board on left for inc left attention  X10 min  No SOLO  1.5 mph bean bags for reactive balance x10 min SOLO  1.5 mph chalk line down center of TM for midline orientation  X5 min   Environment navigation Dispersed colored blocks (inc % on pts left)  color block based on blaze pod  x20 SOLO pool noodles dispersed 1 min 30'' blaze pod cycle  3 trials   Walking HT/HN  X3 laps    tandem  SOLO  X3 laps      hurdles  Solo  8''  X2 laps fwd      Dual task Alk fwd and bwd w basketball bounce inc bounces to the left side  X5 laps   Side step ball bounce  X3 laps    Ther Ex   5x sts  6mwt                                                                     Ther Activity                        Gait Training        Treadmill                 Modalities

## 2025-05-14 NOTE — PROGRESS NOTES
"Occupational Therapy Daily Note:    Today's date: 2025  Patient name: Victor Manuel Mcmanus  : 1950  MRN: [id number]  Referring provider: No ref. provider found  Dx:   Encounter Diagnosis   Name Primary?    Cerebrovascular accident (CVA), unspecified mechanism (HCC) Yes          POC expires Unit limit Auth Expiration date PT/OT/ST + Visit Limit?   6/3/25 N/A 25 BOMN                           Visit/Unit Tracking  AUTH Status:  Date  5/6 5/8 5/13 PN     BOMN Used 1 2 3 4 5 6 7 8 9      Remaining  7 6 5 4 3 2 1 0 0          Duration in weeks: 8 weeks  Plan of care beginning date: 25  Plan of care expiration date: 6/3/25  RE-EVAL: 6/3/25      Precautions: L side inattention,         POC expires Unit limit Auth Expiration date PT/OT/ST + Visit Limit?   6/3/25 N/A 25 BOMN                           Visit/Unit Tracking  AUTH Status:  Date  5/6 5/8 5/13 PN /14    BOMN Used 1 2 3 4 5 6 7 8 9 1     Remaining  7 6 5 4 3 2 1 0 0 7         Duration in weeks: 8 weeks  Plan of care beginning date: 25  Plan of care expiration date: 6/3/25  RE-EVAL: 6/3/25      Precautions: L side inattention,     Subjective: \"Now, what do I do again.\"    Objective: See treatment below.  Pt participated in skilled OT session focusing on vision re-training, visual endurance, /VM skills, for increased and safety during ADL/IADL tasks.    Neuro Re-ed: Pt engaged in coding activity focusing on attention to left, scanning, visual processing, and visual tolerance w/additional cog demands of 3 step verbal direction follow.  Pt provided with a letter/number graph positioned tabletop and color code worksheets positioned to left of pt increasing scanning environment and promoting attention to left to locate letter/number on graph coloring in appropriate color following color grid.  Repetitive cues required to scan to left to identify place on color grid, improvement with use of " occluder and time at task.     Assessment: Tolerated treatment well. Pt utilized visual cues to track intersecting letter/number points for place holding and required occluder to block lines below lines currently working on decreasing clutter, improving activity focus and improving scanning abilities.  Improvement in 3 step sequencing w/repetition of completing directions. Patient would benefit from continued skilled OT.    Plan: Continued skilled OT per POC

## 2025-05-19 ENCOUNTER — DOCUMENTATION (OUTPATIENT)
Dept: ADMINISTRATIVE | Facility: OTHER | Age: 75
End: 2025-05-19

## 2025-05-19 NOTE — PROGRESS NOTES
05/19/25 11:33 AM    CRC outreach is not required,   Patient aged out of measure.       Thank you.  Jacqui Plata MA  [facility]

## 2025-05-20 ENCOUNTER — OFFICE VISIT (OUTPATIENT)
Dept: OCCUPATIONAL THERAPY | Facility: CLINIC | Age: 75
End: 2025-05-20
Payer: MEDICARE

## 2025-05-20 ENCOUNTER — OFFICE VISIT (OUTPATIENT)
Dept: PHYSICAL THERAPY | Facility: CLINIC | Age: 75
End: 2025-05-20
Payer: MEDICARE

## 2025-05-20 DIAGNOSIS — I63.9 OCCIPITOTEMPORAL INFARCT, ACUTE (HCC): Primary | ICD-10-CM

## 2025-05-20 DIAGNOSIS — I63.9 CEREBROVASCULAR ACCIDENT (CVA), UNSPECIFIED MECHANISM (HCC): Primary | ICD-10-CM

## 2025-05-20 PROCEDURE — 97530 THERAPEUTIC ACTIVITIES: CPT

## 2025-05-20 PROCEDURE — 97112 NEUROMUSCULAR REEDUCATION: CPT

## 2025-05-20 NOTE — PROGRESS NOTES
"Occupational Therapy Daily Note:    Today's date: 2025  Patient name: Victor Manuel Mcmanus  : 1950  MRN: [id number]  Referring provider: No ref. provider found  Dx:   Encounter Diagnosis   Name Primary?    Cerebrovascular accident (CVA), unspecified mechanism (HCC) Yes                    POC expires Unit limit Auth Expiration date PT/OT/ST + Visit Limit?   6/3/25 N/A 25 BOMN                           Visit/Unit Tracking  AUTH Status:  Date  5 PN    BOMN Used 1 2 3 4 5 6 7 8 9 1 2    Remaining  7 6 5 4 3 2 1 0 0 7 6        Duration in weeks: 8 weeks  Plan of care beginning date: 25  Plan of care expiration date: 6/3/25  RE-EVAL: 6/3/25      Precautions: L side inattention,     Subjective: \"I think I found them all, wait is this a 7?\"    Objective: See treatment below.  Pt participated in skilled OT session focusing on vision re-training, visual endurance, /VM skills, for increased and safety during ADL/IADL tasks.    There act: Pt started session with spot the difference cards placed right to the L and R of midline. Pt instructed to look in each card and find each of the 6 differences in the cards. Pt required 1:1 assist to follow directions and find the differences.     Pt then seated at table engaged in color by number worksheet. Pt with G understanding of overall instructions with activity focusing on attention to left, scanning, visual processing, and visual tolerance w/additional cog demands for direction following and step by step. Pt instructed to follow number and color key at bottom of worksheet and then scan work sheet number by number to the color in each specific number. At first Pt scanning with unorganized pattern, moderate verbal cues to go up/down, and side/side for increased scanning accuracy and completion.     Assessment: Tolerated treatment well. Pt to continue working on organized scanning pattern for overall accuracy and safety in " the L visual field. Moderate to maximal verbal cues for all tasks. Patient would benefit from continued skilled OT.    Plan: Continued skilled OT per POC

## 2025-05-20 NOTE — PROGRESS NOTES
Daily Note     Today's date: 2025  Patient name: Victor Manuel Mcmanus  : 1950  MRN: [id number]  Referring provider: No ref. provider found  Dx:   Encounter Diagnosis     ICD-10-CM    1. Occipitotemporal infarct, acute (HCC)  I63.9           Start Time: 0848  Stop Time: 09  Total time in clinic (min): 42 minutes    Subjective: pt is in a good mood today and reports he is doing well.       Objective: See treatment diary below      Assessment: Tolerated treatment well. Patient would benefit from continued PT continued emphasis on left side awareness and attention in today's session with most of exercise tasks. Pt demonstrated improvement w left side attention to basketball bounce with pt missing approx 25% bounces to the left in todays session, pt does better w cues to reach with both hands or basketball. Pt fatigues following tandem gait however demonstrates good balance reaction strategies.       Plan: Continue per plan of care.      Short Term Goal Expiration Date:(25)  Long Term Goal Expiration Date: (6/3/25)  POC Expiration Date: (6/3/25)        POC expires Unit limit Auth Expiration date PT/OT/ST + Visit Limit?   6/3/25 6 neuro 25 BOMN                           Visit/Unit Tracking  AUTH Status:  Date 5/20 4/15 4/16 04/22 4/23 4/29 4/30 5/6 5/8 5   None PN every 10 Used  2/10 3/10 4/10 5/10 6/10 7/10 8/10 1/10 2/10 3/10    Remaining  4/10                   Precautions hx CVA, afb, HTN DM2, left side neglect        Manuals                                    Neuro Re-Ed    Edu on resources   Additional eu on neuro plasticity  alternate route analogy  Fga  4 squre step test  Tug  10mwt  3m bwd walk     Multi directional stepping Solo  Hex ring step in correct color based on pod  2 min x2 rounds   Solo  5 cone weave w 6'' hurdles between cones  X3 laps    4 square 6'' natalie set up w blaze pods    10 taps x2 rounds 4 cones weave laterally w card bard at the end, turn over  L shoulder for care placement x5 laps   Agility ladder     SOLO  Lateral 2 feet per square  X1 lap    Lateral movement pattern fwd side bwd  X2 laps   TM SOLO  1.6 mph x8 min BUE use    0.6 x2 min no UE SOLO  1.6 mph x8 min BUE use    0.6 x3 min no UE  No SOLO  1.5 mph bean bags for reactive balance x10 min SOLO  1.5 mph chalk line down center of TM for midline orientation  X5 min   Environment navigation Dispersed colored blocks (inc % on pts left)  color block based on blaze pod  x20 Solo  Dispersed color dots bounce basketball to correct color dot based on blaze pod (most dots on L)  X15 taps  Walking HT/HN  X3 laps    tandem  SOLO  X3 laps       hurdles  SOLO  8''  X3 laps fwd      Dual task Alk fwd and bwd w basketball bounce inc bounces to the left side  X5 laps wAlk fwd and bwd w basketball bounce inc bounces to the left side  X5 laps  Side step ball bounce  X3 laps    Ther Ex   5x sts  6mwt                                                                     Ther Activity                        Gait Training        Treadmill                 Modalities

## 2025-05-21 ENCOUNTER — OFFICE VISIT (OUTPATIENT)
Dept: PHYSICAL THERAPY | Facility: CLINIC | Age: 75
End: 2025-05-21
Payer: MEDICARE

## 2025-05-21 ENCOUNTER — OFFICE VISIT (OUTPATIENT)
Dept: OCCUPATIONAL THERAPY | Facility: CLINIC | Age: 75
End: 2025-05-21
Payer: MEDICARE

## 2025-05-21 DIAGNOSIS — I63.9 OCCIPITOTEMPORAL INFARCT, ACUTE (HCC): Primary | ICD-10-CM

## 2025-05-21 DIAGNOSIS — I63.9 CEREBROVASCULAR ACCIDENT (CVA), UNSPECIFIED MECHANISM (HCC): Primary | ICD-10-CM

## 2025-05-21 PROCEDURE — 97112 NEUROMUSCULAR REEDUCATION: CPT

## 2025-05-21 NOTE — PROGRESS NOTES
Daily Note     Today's date: 2025  Patient name: Victor Manuel Mcmanus  : 1950  MRN: [id number]  Referring provider: No ref. provider found  Dx:   Encounter Diagnosis     ICD-10-CM    1. Occipitotemporal infarct, acute (HCC)  I63.9           Start Time: 0845  Stop Time: 09  Total time in clinic (min): 45 minutes    Subjective: pt reports he is doing well today no sig changes since yesterdays visit      Objective: See treatment diary below      Assessment: Tolerated treatment well. Patient would benefit from continued PT multi directional stepping activity to also incorporate environment scanning for correct color ring. Inc difficulty w identifying rings on the left balance remains consistent w right and left stepping. Inc basketball bounce pass and balloon pass w inc passes on pts left side. Pt demonstrates good awareness of left side today especially w balloon passes. Inc left gaze and scanning on TM w playing card board on pts L.       Plan: Continue per plan of care.      Short Term Goal Expiration Date:(25)  Long Term Goal Expiration Date: (6/3/25)  POC Expiration Date: (6/3/25)        POC expires Unit limit Auth Expiration date PT/OT/ST + Visit Limit?   6/3/25 6 neuro 25 BOMN                           Visit/Unit Tracking  AUTH Status:  Date  5 5 5.   None PN every 10 Used  5/10 3/10 4/10 5/10 6/10 7/10 8/10 1/10 2/10 3/10    Remaining  /10                   Precautions hx CVA, afb, HTN DM2, left side neglect        Manuals  5 5                                   Neuro Re-Ed         Multi directional stepping Solo  Hex ring step in correct color based on pod  2 min x2 rounds  Solo  Hex ring step in correct color based on pod  2 min x2 rounds Solo  5 cone weave w 6'' hurdles between cones  X3 laps    4 square 6'' natalie set up w blaze pods    10 taps x2 rounds 4 cones weave laterally w card bard at the end, turn over L shoulder for  care placement x5 laps   Agility ladder     SOLO  Lateral 2 feet per square  X1 lap    Lateral movement pattern fwd side bwd  X2 laps   TM SOLO  1.6 mph x8 min BUE use    0.6 x2 min no UE SOLO  1.6 mph x8 min BUE use    0.6 x3 min no UE  No SOLO  1.5 mph bean bags for reactive balance x10 min SOLO  1.5 mph chalk line down center of TM for midline orientation  X5 min   Environment navigation/scanning Dispersed colored blocks (inc % on pts left)  color block based on blaze pod  x20 Solo  Dispersed color dots bounce basketball to correct color dot based on blaze pod (most dots on L)  X15 taps Solo  Static balance on airex  Basketball bounce pass  X20    Racket balloon pass   X2 min racket in L hand  X2 min racket in R hand    TM 1.2 mph playing cards n pts right playing cad board on the L  X10 min Walking HT/HN  X3 laps    tandem  SOLO  X3 laps  SOLO  X3 laps      hurdles  SOLO  8''  X3 laps fwd      Dual task Alk fwd and bwd w basketball bounce inc bounces to the left side  X5 laps wAlk fwd and bwd w basketball bounce inc bounces to the left side  X5 laps  Side step ball bounce  X3 laps    Ther Ex                                                                        Ther Activity                        Gait Training        Treadmill                 Modalities

## 2025-05-21 NOTE — PROGRESS NOTES
Occupational Therapy Daily Note:    Today's date: 2025  Patient name: Victor Manuel Mcmanus  : 1950  MRN: [id number]  Referring provider: No ref. provider found  Dx:   Encounter Diagnosis   Name Primary?    Cerebrovascular accident (CVA), unspecified mechanism (HCC) Yes                      POC expires Unit limit Auth Expiration date PT/OT/ST + Visit Limit?   6/3/25 N/A 25 BOMN                           Visit/Unit Tracking  AUTH Status:  Date  5 PN    BOMN Used 1 2 3 4 5 6 7 8 9 1 2    Remaining  7 6 5 4 3 2 1 0 0 7 6      Visit/Unit Tracking  AUTH Status:  Date 5/21             BOMN Used 3              Remaining  5                  Duration in weeks: 8 weeks  Plan of care beginning date: 25  Plan of care expiration date: 6/3/25  RE-EVAL: 6/3/25      Precautions: L side inattention,     Subjective: “Nothing new today, I mean its raining”    Objective: See treatment below.  Pt participated in skilled OT session focusing on vision re-training, visual endurance, /VM skills, for increased and safety during ADL/IADL tasks.    There act: Pt seated at table working on scanning to the L side, attention to L side, direction following and task completion. Pt instructed to grasp and find number piece on L side of table, then place to number Velcro card board on R side on mirror board. Once doing that then Pt finding small letter cube starting at A from scattered pieces on table, and placing on top of letter board on table. Pt required mod/max verbal cues for first half of activity to stay on track with correct number and letter and to scan to L side to find number card. By #7 and letter G, pt able to stay on track with minimal verbal cues.     Assessment: Tolerated treatment well. Pt cues to scan to L and not move pieces into R visual field throughout session. Pt to continue with scanning with added cog demand activities in future sessions. Patient would benefit  from continued skilled OT.    Plan: Continued skilled OT per POC

## 2025-05-27 ENCOUNTER — OFFICE VISIT (OUTPATIENT)
Dept: PHYSICAL THERAPY | Facility: CLINIC | Age: 75
End: 2025-05-27
Payer: MEDICARE

## 2025-05-27 ENCOUNTER — OFFICE VISIT (OUTPATIENT)
Dept: OCCUPATIONAL THERAPY | Facility: CLINIC | Age: 75
End: 2025-05-27
Payer: MEDICARE

## 2025-05-27 DIAGNOSIS — I63.9 OCCIPITOTEMPORAL INFARCT, ACUTE (HCC): Primary | ICD-10-CM

## 2025-05-27 DIAGNOSIS — I63.9 CEREBROVASCULAR ACCIDENT (CVA), UNSPECIFIED MECHANISM (HCC): Primary | ICD-10-CM

## 2025-05-27 PROCEDURE — 97112 NEUROMUSCULAR REEDUCATION: CPT

## 2025-05-27 NOTE — PROGRESS NOTES
"Occupational Therapy Daily Note:    Today's date: 2025  Patient name: Victor Manuel Mcmanus  : 1950  MRN: [id number]  Referring provider: No ref. provider found  Dx:   Encounter Diagnosis   Name Primary?    Cerebrovascular accident (CVA), unspecified mechanism (HCC) Yes       Start Time: 0800  Stop Time: 0845  Total time in clinic (min): 45 minutes      POC expires Unit limit Auth Expiration date PT/OT/ST + Visit Limit?   6/3/25 N/A 25 BOMN                           Visit/Unit Tracking  AUTH Status:  Date  5 PN    BOMN Used 1 2 3 4 5 6 7 8 9 1 2    Remaining  7 6 5 4 3 2 1 0 0 7 6      Visit/Unit Tracking  AUTH Status:  Date             BOMN Used 3 4             Remaining  5 4                 Duration in weeks: 8 weeks  Plan of care beginning date: 25  Plan of care expiration date: 6/3/25  RE-EVAL: 6/3/25      Precautions: L side inattention,     Subjective: \"I just can't see it.\"    Objective: See treatment below. Pt participated in skilled OT session focusing on vision re-training, visual endurance, /VM skills, for increased and safety during ADL/IADL tasks.    Neuro Re-ed:  Pt participated in I [employer] [place] focusing on VS, attention into left side, scanning and multi directional saccades.   Pt provided with 4 clutter cards instructed to identity 8 objects within those 4 cards(front and back of cards). Image card placed in left peripheral of pt w/cards positioned table top facilitating increasing visual field.  Pt able to locate 7/8 images w/ cues provided >50% of time.      Assessment: Tolerated treatment well. Pt utilized magnifying glass during 2nd 1/2 of session w/o improvement in identifying objects.  Pt noted to confuse images ie...dumptruck for marble.  Patient would benefit from continued skilled OT.    Plan: Continued skilled OT per POC    "

## 2025-05-27 NOTE — PROGRESS NOTES
Daily Note     Today's date: 2025  Patient name: Victor Manuel Mcmanus  : 1950  MRN: [id number]  Referring provider: No ref. provider found  Dx:   Encounter Diagnosis     ICD-10-CM    1. Occipitotemporal infarct, acute (HCC)  I63.9           Start Time: 0845  Stop Time: 09  Total time in clinic (min): 45 minutes    Subjective: pt reports he is doing well today w no changes over the long weekend      Objective: See treatment diary below      Assessment: Tolerated treatment well. Patient would benefit from continued PT pt demonstrated approx 20% error rate w reactive stepping around bean bags on TM belt which is an improvement from previous attempts. All errors occurred on bags on the left of the belt. Dec in errors noted w balloon racket pass today as well w contained misses being left sided. W LOB on airex pad pt is able to correct w stepping strategies indep and recover. Inc natalie height w weaving today w good tolerance. Pt also demonstrates dec veering away from set up exercise today.      Plan: Continue per plan of care.      Short Term Goal Expiration Date:(25)  Long Term Goal Expiration Date: (6/3/25)  POC Expiration Date: (6/3/25)        POC expires Unit limit Auth Expiration date PT/OT/ST + Visit Limit?   6/3/25 6 neuro 25 BOMN                           Visit/Unit Tracking  AUTH Status:  Date  5 5 5.   None PN every 10 Used  5/10 6/10 4/10 5/10 6/10 7/10 8/10 1/10 2/10 3/10    Remaining  /10                   Precautions hx CVA, afb, HTN DM2, left side neglect        Manuals                                    Neuro Re-Ed         Multi directional stepping Solo  Hex ring step in correct color based on pod  2 min x2 rounds  Solo  Hex ring step in correct color based on pod  2 min x2 rounds Solo  Weave in and out of cones w 8'' hurdles between   X3 laps    Fwd bwd weaving 5 cones  X3 laps 4 cones weave laterally w card bard  at the end, turn over L shoulder for care placement x5 laps   Agility ladder     SOLO  Lateral 2 feet per square  X1 lap    Lateral movement pattern fwd side bwd  X2 laps   TM SOLO  1.6 mph x8 min BUE use    0.6 x2 min no UE SOLO  1.6 mph x8 min BUE use    0.6 x3 min no UE  SOLO  1.6 mph x8 min BUE use    Bean bags on TM belt for reactive stepping SOLO  1.5 mph chalk line down center of TM for midline orientation  X5 min   Environment navigation/scanning Dispersed colored blocks (inc % on pts left)  color block based on blaze pod  x20 Solo  Dispersed color dots bounce basketball to correct color dot based on blaze pod (most dots on L)  X15 taps Solo  Static balance on airex  Basketball bounce pass  X20    Racket balloon pass   X2 min racket in L hand  X2 min racket in R hand    TM 1.2 mph playing cards n pts right playing cad board on the L  X10 min Racket balloon pass   X2 min racket in L hand  X2 min racket in R hand    4 blaze pods in square 1 min 30s x2 rounds    tandem  SOLO  X3 laps  SOLO  X3 laps      hurdles  SOLO  8''  X3 laps fwd      Dual task Alk fwd and bwd w basketball bounce inc bounces to the left side  X5 laps wAlk fwd and bwd w basketball bounce inc bounces to the left side  X5 laps      Ther Ex                                                                        Ther Activity                        Gait Training        Treadmill                 Modalities

## 2025-05-29 ENCOUNTER — OFFICE VISIT (OUTPATIENT)
Dept: OCCUPATIONAL THERAPY | Facility: CLINIC | Age: 75
End: 2025-05-29
Payer: MEDICARE

## 2025-05-29 ENCOUNTER — OFFICE VISIT (OUTPATIENT)
Dept: PHYSICAL THERAPY | Facility: CLINIC | Age: 75
End: 2025-05-29
Payer: MEDICARE

## 2025-05-29 DIAGNOSIS — I63.9 OCCIPITOTEMPORAL INFARCT, ACUTE (HCC): Primary | ICD-10-CM

## 2025-05-29 DIAGNOSIS — I63.9 CEREBROVASCULAR ACCIDENT (CVA), UNSPECIFIED MECHANISM (HCC): Primary | ICD-10-CM

## 2025-05-29 PROCEDURE — 97112 NEUROMUSCULAR REEDUCATION: CPT

## 2025-05-29 PROCEDURE — 97530 THERAPEUTIC ACTIVITIES: CPT

## 2025-05-29 NOTE — PROGRESS NOTES
Daily Note     Today's date: 2025  Patient name: Victor Manuel Mcmanus  : 1950  MRN: [id number]  Referring provider: Luis E Mcdaniels MD  Dx:   Encounter Diagnosis     ICD-10-CM    1. Occipitotemporal infarct, acute (HCC)  I63.9           Start Time: 0800  Stop Time: 0845  Total time in clinic (min): 45 minutes    Subjective: pts wife reports his walking and balance has improved a lot his attention to the left continues to be the biggest challenge for pt      Objective: See treatment diary below      Assessment: Tolerated treatment well. Patient would benefit from continued PT slight decrease in intensity of exercise today due to HA during session. Pt demonstrated dec energy today during session but was able to complete an participate in all exercises. Continued focus on left sided attention w blaze pod activity. The farther to the left the more difficult for pt to locate light. Additionally if the light is placed above or below eye level pt has inc difficulty. In attention to left with basketball passes today w one error on the left side      Plan: Continue per plan of care.      Short Term Goal Expiration Date:(25)  Long Term Goal Expiration Date: (6/3/25)  POC Expiration Date: (6/3/25)        POC expires Unit limit Auth Expiration date PT/OT/ST + Visit Limit?   6/3/25 6 neuro 25 BOMN                           Visit/Unit Tracking  AUTH Status:  Date  5 5 5.13    None PN every 10 Used  10 6/10 7/10 5/10 6/10 7/10 8/10 110 2/10 3/10    Remaining  /10                   Precautions hx CVA, afb, HTN DM2, left side neglect        Manuals                                    Neuro Re-Ed         Multi directional stepping Solo  Hex ring step in correct color based on pod  2 min x2 rounds  Solo  Hex ring step in correct color based on pod  2 min x2 rounds Solo  Weave in and out of cones w 8'' hurdles between   X3 laps    Fwd bwd weaving 5  cones  X3 laps    Agility ladder        TM SOLO  1.6 mph x8 min BUE use    0.6 x2 min no UE SOLO  1.6 mph x8 min BUE use    0.6 x3 min no UE  SOLO  1.6 mph x8 min BUE use    Bean bags on TM belt for reactive stepping 1.5 mph (slight speed dec due to no SOLO)  BUE   X10 min neural priming    Environment navigation/scanning Dispersed colored blocks (inc % on pts left)  color block based on blaze pod  x20 Solo  Dispersed color dots bounce basketball to correct color dot based on blaze pod (most dots on L)  X15 taps Solo  Static balance on airex  Basketball bounce pass  X20    Racket balloon pass   X2 min racket in L hand  X2 min racket in R hand    TM 1.2 mph playing cards n pts right playing cad board on the L  X10 min Racket balloon pass   X2 min racket in L hand  X2 min racket in R hand    4 blaze pods in square 1 min 30s x2 rounds Solo  6 pods 3 on mirror on pts left at varied heights 3 on floor (one left 2 in front of pt  2 min x3 rounds    Solo  Static balance on airex  Basketball bounce pass  X20   tandem  SOLO  X3 laps  SOLO  X3 laps      hurdles  SOLO  8''  X3 laps fwd   SOLO  8''  X2 laps fwd  X2 laps laterl   Dual task Alk fwd and bwd w basketball bounce inc bounces to the left side  X5 laps wAlk fwd and bwd w basketball bounce inc bounces to the left side  X5 laps      Ther Ex                                                                        Ther Activity                        Gait Training        Treadmill                 Modalities

## 2025-05-29 NOTE — PROGRESS NOTES
"Occupational Therapy Daily Note:    Today's date: 2025  Patient name: Victor Manuel Mcmanus  : 1950  MRN: [id number]  Referring provider: Luis E Mcdaniels MD  Dx:   Encounter Diagnosis   Name Primary?    Cerebrovascular accident (CVA), unspecified mechanism (HCC) Yes       Start Time: 0845  Stop Time: 0930  Total time in clinic (min): 45 minutes    POC expires Unit limit Auth Expiration date PT/OT/ST + Visit Limit?   6/3/25 N/A 25 BOMN                           Visit/Unit Tracking  AUTH Status:  Date  PN    BOMN Used 1 2 3 4 5 6 7 8 9 1 2    Remaining  7 6 5 4 3 2 1 0 0 7 6      Visit/Unit Tracking  AUTH Status:  Date            BOMN Used 3 4 5            Remaining  5 4 3                Duration in weeks: 8 weeks  Plan of care beginning date: 25  Plan of care expiration date: 6/3/25  RE-EVAL: 6/3/25      Precautions: L side inattention,     Subjective: \"I get so frustrated, I don't know why I don't get it.\"    Objective: See treatment below.  Pt participated in skilled OT session focusing on vision re-training, visual endurance, /VM skills, for increased and safety during ADL/IADL tasks.    Thera Act: Pt engaged in temporal awareness task focusing on left visual field attention,  visual processing, working memory, verbal direction follow w/4 step task sequencing, and attention.  Pt provided with worksheet containing clocks and word problems, instructed to read and solve word problems followed by identifying correct clock and marking in space provided.  Pt required visual cues of high lighter to jessy where sentence began facilitating scanning to left of worksheet as well as crossing off clock and word problems already completed.  Pt required 1:1 assist for 100% of activity 2* decreased attention to task and frequently confusing sequence of steps.      Assessment: Tolerated treatment well. Pt able to identify accurate clock completing math " portion of activity.  Pt unable to follow through indep with steps of task despite visual aid provided.  Patient would benefit from continued skilled OT.    Plan: Continued skilled OT per POC

## 2025-06-04 ENCOUNTER — EVALUATION (OUTPATIENT)
Dept: PHYSICAL THERAPY | Facility: CLINIC | Age: 75
End: 2025-06-04
Payer: MEDICARE

## 2025-06-04 ENCOUNTER — EVALUATION (OUTPATIENT)
Dept: OCCUPATIONAL THERAPY | Facility: CLINIC | Age: 75
End: 2025-06-04
Payer: MEDICARE

## 2025-06-04 DIAGNOSIS — I63.9 OCCIPITOTEMPORAL INFARCT, ACUTE (HCC): Primary | ICD-10-CM

## 2025-06-04 DIAGNOSIS — I63.9 CEREBROVASCULAR ACCIDENT (CVA), UNSPECIFIED MECHANISM (HCC): Primary | ICD-10-CM

## 2025-06-04 PROCEDURE — 97168 OT RE-EVAL EST PLAN CARE: CPT

## 2025-06-04 PROCEDURE — 97530 THERAPEUTIC ACTIVITIES: CPT

## 2025-06-04 PROCEDURE — 97110 THERAPEUTIC EXERCISES: CPT

## 2025-06-04 PROCEDURE — 97112 NEUROMUSCULAR REEDUCATION: CPT

## 2025-06-04 NOTE — PROGRESS NOTES
Daily Note     Today's date: 2025  Patient name: Victor Manuel Mcmanus  : 1950  MRN: [id number]  Referring provider: No ref. provider found  Dx:   Encounter Diagnosis     ICD-10-CM    1. Occipitotemporal infarct, acute (HCC)  I63.9           Start Time: 0800  Stop Time: 0845  Total time in clinic (min): 45 minutes    Subjective: pts wife repots the biggest challenge for the pt at this time is depth perception and environmental navigation. Wife reports that she is having to cue him less often to look around to watch out for something however every so often she will have to cue him. She reports she does have to cue him when a curb is upcoming so he does not trip and fall on it. Pt reports descending steps is still a challenge for him and reports this is mainly due to his knee. Pt reports that uneven surfaces are challenging for him like walking on pavement w a SPC      Objective: See treatment diary below      Assessment: Tolerated treatment well. Patient would benefit from continued PT Pt has attended 8 weeks physical therapy sessions including IE and is demonstrating progress towards goals.  Pt has met 5/5 STG and 4/4 LTG at this time Pt has improved their 6mwt distance from 808 feet to 850 ft indicating improved aerobic capacity and activity tolerance. Pts TUG time of 10.03 s indicates pt is not at risk for falls based on this test as socres > 13.5 s are considered at risk. pts FGA score has improved from 24/30 to 25/30 reducing pts fall risk however still placing them at risk for falls as scores < 23 are considered at risk for falls. Pts 5x STS tests has improved from 22.28 s UE to 13.72 no UE s indicating improved LE strength and power. Pts 3m bwd walking time has remained consistent from PN from 5.22 to 5.44 s (however has improved from >9 s on eval) indicating pt is at inc risk for falls as scores > 4.5 s are at risk for falls. Additionally pts 4 square step test time has improved from 12s however stepping  on canes to 14s however no stepping on canes indicating pt is at inc risk for falls as times >10.4s are considered pts identified as fallers and scores > 15 s indicate pt is as risk for multiple falls.     Pt provided family edu on dec cueing pt and allowing him time to locate or attempt to locate objects inde before cueing and when cues re needed providing cues such as if you can find it which side do you think it is on to allow for more independent environmental navigation.  Pt would continue to benefit from skilled physical therapy to improve overall QOL inclusive of, strength, coordination, balance, endurance and functional mobility in the home and in the community as well as reduce their fall risk for improved safety.         Assessment  Impairments: abnormal coordination, abnormal gait, abnormal muscle tone, activity intolerance, impaired balance, impaired physical strength, lacks appropriate home exercise program, participation limitations, activity limitations and endurance     Assessment details: Pt is a 75 year old male presenting to [facility] physical therapy for an initial evaluation secondary to CVA. Pt presents to clinic with gait and balance impairments inclusive of left side neglect and visual impairment impacting pts ability to safely navigate community and environment, MH of shoe lift on RLE and R knee fx and utilizes SPC assistive device for mobility (in the home/in the community) .       Additionally pt demonstrates strength deficits as seen with MMT scores of <5/5 and 5x STS time of 22.28 demonstrating dec LE strength and power as compared to age matched norms.  Pt is at inc risk for falls based on their FGA score of 20/30  with scores < 20/30 at inc risk for spontaneous falls and scores < 23/30 considered at risk for falls. pts TUG time of 16.0 s w SPC (verbal cues for direction of ambulation and inc time to locate cones) puts them at inc risk for falls as scores > 13.5 are considered at risk for  falls.Pts 3m bwd walking time of 9,53 s  indicates pt is at inc risk for falls as scores > 4.5 s are at risk for falls.  6mwt and 10mwt to be complete NV for endurance and gait speed testing. Pt was provided edu on left sided awareness and spatial awareness when navigating environments after stroke.     Pt would benefit from skilled physical therapy to improve  overall QOL inclusive of, strength, coordination, balance, endurance and functional mobility in the home and in the community as well as reduce their fall risk for improved safety.         Goals  STG (4 weeks)   Improve TUG score of 16 by 2 seconds indicating meaningful improvement in fall risk MET  Improve FGA score of 20 by 2 indicating meaningful improvement in fall risk MET  Improve 5x STS score of 22.28 by 2.3 seconds indicating meaningful improvement in fall risk and power generation MET  Complete 6mwt for assessment of  aerobic endurance  MET  Pt will improve 3m bwd walk time of 9.53 by 1.57s (MDC) to demonstrate an improvement in balance and a reduction in fall risk MET        LTG (8 weeks)   Improve TUG score to <13.5 seconds indicating meaningful improvement in fall risk MET  Improve FGA score to > 23/30 indicating meaningful improvement in fall risk MET  Improve/Reduce 5x STS score to < 18 seconds seconds indicating meaningful improvement in fall risk and power generation MET  Pt will improve 3m bwd walk time of 9.53  to <6 to demonstrate an improvement in balance and a reduction in fall risk MET    New goals  STG (4 weeks)    Increase 6MWT of 850 by 82 ft indicating meaningful change in aerobic endurance   Patient will improve gait speed to 0.95 to improve (household/community distance ambulation) and reduce pts fall risk  Pt will improve 4 square step test time of 14 by 4.6s (MDC) to demonstrate an improvement in balance and a reduction in pts fall risk      LTG (8 weeks)     Patient will improve gait speed to >1 to improve functional community  ambulation and reduce pts fall risk  Pt will improve 3m bwd walk time of 5.44  to <4.5 to demonstrate an improvement in balance and a reduction in fall risk  Improve safety awareness with dec in left sided errors when walking outside (running into left sided obstacles or walking too close to edge of side walk impacting safety)               Plan  Patient would benefit from: skilled physical therapy     Planned therapy interventions: balance, gait training, graded activity, graded exercise, home exercise program, neuromuscular re-education, patient/caregiver education, strengthening, stretching, therapeutic activities and therapeutic exercise     Frequency: 2x week  Duration in weeks: 8  Plan of Care beginning date: 6/4/2025  Plan of Care expiration date: 7/30/2025  Treatment plan discussed with: patient        Subjective Evaluation     History of Present Illness  Mechanism of injury: Per epic chart review:  Repeat CT of the head on 2/24 showed redemonstration of evolving infarct in the right occipital and posterior temporal lobes, now extending to the right parietal lobe. There are small areas of hemorrhagic transformation in these these regions of infarction. There is substantial cytotoxic edema in these regions with sulcal effacement and on the right as well as and effacement of the portions of the right lateral ventricle. Additional small acute infarct in the inferior aspect of right cerebellar hemisphere, small infarct in the right thalamus. [facility] was consulted for GOC and patient remained treatment focused.     Per pt report:  He is doing okay in terms of his walking and balance but has to take his time. Pt pt is currently using an SPC self reported about 60% of the time ut was not using it pretty much at all before. Pt reports he has been able to use the stairs with a railing. Pt has been walking on various surfaces (grass side walk gravel) with PT when he was in the hospital and has been doing well with that.  Wife reports left sided inattention  Patient Goals  Patient goals for therapy: improved balance  Patient goal: improved independence  Pain  No pain reported           Objective        Balance Test IE 5/6 6/4      6 Minute Walk Test (ft): 702 808 850      2 Minute Walk Test (ft):         Gait Speed (m/s): 0.62 0.94 0.89      5x Sit To Stand (s): 22.28 ue  13.72 no UE      TUG (s) 16 spc 11.54 no AD 10.03      FGA  3 m bwd walk 20  9.53 24  5.22 25  5.44      Mini BEST         4 square step test  12.85s 1 step on canes 14.8 no stepping on canes             Short Term Goal Expiration Date:(6/2/25)  Long Term Goal Expiration Date: (7/30/25)  POC Expiration Date: (7/30/25)        POC expires Unit limit Auth Expiration date PT/OT/ST + Visit Limit?   7/30/25 6 neuro 12/31/25 BOMN                           Visit/Unit Tracking  AUTH Status:  Date 5/20 5/21 5/27 5/29 6/4 4/29 4/30 5/6 5/8 5.13 5/14   None PN every 10 Used  5/10 6/10 7/10 8/10 6/10 7/10 8/10 1/10 2/10 3/10    Remaining  4/10                   Precautions hx CVA, afb, HTN DM2, left side neglect        Manuals 6/4 5/20 5/21 5/27 5/29                                   Neuro Re-Ed  Fga  3m bwd walk  4 square step test  10mwt  Tug    Edu on increasing independence w family as described above       Multi directional stepping   Solo  Hex ring step in correct color based on pod  2 min x2 rounds Solo  Weave in and out of cones w 8'' hurdles between   X3 laps    Fwd bwd weaving 5 cones  X3 laps    Uneven surface Walking across grass, sidewalk CGA via GB  X6 min       TM  SOLO  1.6 mph x8 min BUE use    0.6 x3 min no UE  SOLO  1.6 mph x8 min BUE use    Bean bags on TM belt for reactive stepping 1.5 mph (slight speed dec due to no SOLO)  BUE   X10 min neural priming    Environment navigation/scanning  Solo  Dispersed color dots bounce basketball to correct color dot based on blaze pod (most dots on L)  X15 taps Solo  Static balance on airex  Basketball bounce  pass  X20    Racket balloon pass   X2 min racket in L hand  X2 min racket in R hand    TM 1.2 mph playing cards n pts right playing cad board on the L  X10 min Racket balloon pass   X2 min racket in L hand  X2 min racket in R hand    4 blaze pods in square 1 min 30s x2 rounds Solo  6 pods 3 on mirror on pts left at varied heights 3 on floor (one left 2 in front of pt  2 min x3 rounds    Solo  Static balance on airex  Basketball bounce pass  X20   tandem  SOLO  X3 laps  SOLO  X3 laps      hurdles  SOLO  8''  X3 laps fwd   SOLO  8''  X2 laps fwd  X2 laps laterl   Dual task  wAlk fwd and bwd w basketball bounce inc bounces to the left side  X5 laps      Ther Ex 6mwt  5x sts                                                                       Ther Activity                        Gait Training        Treadmill                 Modalities

## 2025-06-04 NOTE — PROGRESS NOTES
OCCUPATIONAL THERAPY RE-EVALUATION:     6/4/25  Victor Manuel Mcmanus  1950  [id number]  Luis E Mcdaniels MD   Diagnosis ICD-10-CM Associated Orders   1. Cerebrovascular accident (CVA), unspecified mechanism (HCC)  I63.9             Assessment/Plan    SKILLED ANALYSIS:  Pt is a 75 y.o. male referred to Occupational Therapy s/p Cerebrovascular accident (CVA), unspecified mechanism (HCC) [I63.9].  Pt participated in skilled OT re-evaluation and following formalized testing as well as clinical observation, Pt presents with the following areas of deficit:  B/L eye teaming, slowed scanning to L side, L side awareness/inattention, L visual field deficits with inabilities to see visual target until at MIDLINE during two person confrontation test, decreased insight/awareness into L visual field deficits. Pt reports to have improvements in double vision since his stroke. He states that prior to his stroke he would get double vision every once in a while due to his diabetes and he would close one eye for a bit for it to go away. He continues to report blurriness even with glasses's donned.   He states that since the stroke his depth perception has been off. He states that he finds that more often than not he is able to find his way around a room as long as it is not a new space. He will often compensate moving things into his right visual field in order to see them. Education to keep things at midline in order to challenge visual fields when scanning to see to L side.  Pt completed vision testing this date with overall minimal deficits noted in convergence and ROM, however significant deficits on the L side during visual field testing as well as decline in [place] cancellation since evaluation. Pt often trying to move paper to right side of table, however therapist providing stabilization of paper to keep at midline when completing. Pt continues to be educated on scanning using eyes and head when walking to the L side to avoid  injury. Pt provided worksheets to complete at home for carry over. Pt wife discussing possibly having 3x a week for more carry over, therapist to work through schedule as Pt would benefit if able. Pt will benefit from skilled Occupational Therapy services 2-3x/week for 8 more weeks with focus on there act, there ex, and neuro re-ed.       POC expires Unit limit Auth Expiration date PT/OT/ST + Visit Limit?   6/3/25 N/A 12/31/25 BOMN                           Visit/Unit Tracking  AUTH Status:  Date 4/8 4/16 4/22 4/23 4/29 4/30 5/6 5/8 5/13 PN 5/14 5/20   BOMN Used 1 2 3 4 5 6 7 8 9 1 2    Remaining  7 6 5 4 3 2 1 0 0 7 6      Visit/Unit Tracking  AUTH Status:  Date 5/21 5/27 5/29 6/4 RE          BOMN Used 3 4 5 6           Remaining  5 4 3 2               Duration in weeks: 8 weeks  Plan of care beginning date: 6/4/25  Plan of care expiration date: 7/30/25  PN: 7/3/25      Precautions: L side inattention,     GOALS    Short Term Goals (4 weeks):  Cognition  - Pt will demo ability to participate in dual tasking/divided attention task with 50% accuracy in multimodal environment to simulate return to life roles PROGRESSING  - Pt will increase auditory processing speed for note taking/direction following requiring repetition of directions <50% of the time for improved role performance and engagement in salient tasks PROGRESSING    Vision  - Pt will demo with G carryover of compensatory and visual search strategies for visual tracking/gazing in LEFT peripheral visual fields to improve functional performance in salient tasks and safety PROGRESSING  - Pt will improve visual fixation to > 10 seconds on one target to L visual fields demo improved focus, attention, and visual memory  PROGRESSING  - Pt will demo with improved abilities to identify visual cues in L  peripheral visual field x 50% for increased safety in multimodal environments PROGRESSING  - Pt will demo with improved figure ground visual perceptual skills x 25%  "for improved visual attention when driving, identifying items in busy environments in home environment, or to improved abilities locating items in grocery stores PROGRESSING  - Pt will be able to cancel out 22/35 bells on bells cancelation test demo improved scanning to the L side past midline for increased ability to participate in daily routine. NOT MET   - Pt will be able to complete puzzle activity with minimal to no verbal cues demo improved scanning to L side to complete ADLs and IADLs. PROGRESSING    Long Term Goals (8 weeks):   - Pt will be independent with vision and motor HEP focusing on visual tracking, saccades, and pursuits to improve safety for functional mobility in new environments PROGRESSING  - Pt will increase visual attention to task for 40+ minutes in multimodal environment to improve visual skills for ADLs and IADLs PROGRESSING   - Pt will Improve visual pursuits and tracking from right to left visual fields for improved daily performance and completion of salient tasks PROGRESSING  - Pt will be able to cancel out 30/35 bells on bells cancelation test demo improved scanning to the L side past midline for increased ability to participate in daily routine. PROGRESSING    Subjective    PATIENT GOAL: \"To be independent and walking better\"    Patient-Specific Functional Scale   Task is scored 0 (unable to perform activity) to 10 (ability to perform activity independently)    Activity Date: Date:   Vision/scanning     2.   Endurance     3.        4.            HISTORY OF PRESENT ILLNESS:     Pt is a 75 y.o. male who was referred to Occupational Therapy s/p Cerebrovascular accident (CVA), unspecified mechanism (HCC) [I63.9]. Pt was in the ER and was in the hospital for about a month and then completed inpatient rehab at [facility]. Pt has discharged from hospital on 3/22 and reports that since being home he has been working to slowly get back to doing things at home as he used to. Pt is currently getting " treated for a burn on his upper thigh that he sustained from when in the hospital. Pt reports that he did have some UE weakness but he has been completing a strengthening HEP with 5# wts at home daily. Pt reports some depth perception issues in regards to his vision but overall does not feel that his vision has changed much. He wears glasses's and reports that even prior to the stroke he would sometimes have double vision when he would have to close one eye. Pt does feel that things are a little harder when on the L side or specifically when trying to sit down to make sure the chair is there prior to sitting.   Pt lives with his wife and is able to complete ADLs independently. Pt will do minimal cooking and cleaning at home. Pt does not report many differences, however therapist noting some repetition of conversation throughout evaluation. Pt wife reports that she oversees things such as medication mgt and walking different places to ensure safety throughout the house. Pt and wife complete finace mgmt together but wife mainly completes. 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     PMH:   Past Medical History:   Diagnosis Date    Arthritis     Atrial fibrillation (HCC)     Coronary artery disease (CAD) excluded 9/12/2013    COVID-19 01/26/2022    COVID-19 vaccination refused 1/27/2022    COVID-19 virus infection 1/27/2022    Diabetes 1.5, 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HCC)     Essential hypertension 3/7/2016    Hepatitis C virus infection resolved after antiviral drug therapy 10/17/2019    Hepatitis C, acute     HTN (hypertension)     Influenza     Neuropathy     Pneumonia 01/26/2022    Tick bite of left wrist 11/26/2021    Viral illness 06/16/2021       Past Surgical Hx:   Past Surgical History:   Procedure Laterality Date    CYSTOSTOMY W/ BLADDER DILATION      basket extraction of calculus    FEMUR FRACTURE SURGERY      LITHOTRIPSY      PATELLA FRACTURE SURGERY      STONE ANALYSIS (HISTORICAL)            Pain  Levels  Restin    With Activity:  0        Objective    IMPAIRMENTS SECTION            Assessments    Vision         Comments                                        VISION SCREEN         GLASSES (prescription)          Symptoms Include blurred vision     Last Eye Exam 1 year ago           Visual Acuity (near) R eye  20/40   L eye 20/40    Binocularity (near) orthophoria WFL- remained   Binocularity (far) orthophoria WFL- remained   Convergence  NO Diplopia reported  Pt reports that sometimes when looking far will have to close one eye due to seeing double   Accommodation  Blurriness/loss of focus at 3 inches WFL   Radu String             Alignment  Y and suppression of near        Mobility             Pursuits smooth in all planes WFL- remained           Saccades smooth in all planes, slowed when scanning to the L side and upper/lower L quadrant   Slowed to find in L sided plane at upper and lower, able to find at midline of L side           Range of Motion WFL    Two person confrontation test Unable to see target until MIDLINE in all planes on L side Impaired - slight improvement in upper L quadrant however still impaired.               [place] Cancellation Test     Total # of bells circled:   Time taken to complete test:  2 min 39 seconds    Number of Omissions:  1 in column 1- declined   1 in column 2- declined   1 in column 3- declined   0 in column 4-declined   1 in column 5-declined   5 in column 6-remained  5 in column 7 -improved    Tracking pattern:Scanning on R side top to bottom and right to left; scattered scanning pattern towards the L          Declined       PLANNED THERAPY INTERVENTIONS:  Internal and external memory aides  Multimatrix for saccades/ visual clutter/attention  Hypersensitivity strategies education  Multi-modal environment  Sustained/alternating/divided attention  Tracking tube  Oculomotor control:  saccades, con/divergence  Conv./div. Dynamic tasks  Work stations with timed  transitions  Temporal Awareness: Organize the Hour activities  Memory and mental manipulation  Auditory processing with immediate recall  Memory retention with immediate and delayed recall  Edu on cog/vision apps  Dalia alford scanning sheets

## 2025-06-04 NOTE — LETTER
2025    Luis E Mcdaniels MD  [address]  [address]  [place] [address]    Patient: Victor Manuel Mcmanus   YOB: 1950   Date of Visit: 2025     Encounter Diagnosis     ICD-10-CM    1. Occipitotemporal infarct, acute (HCC)  I63.9           Dear Dr. Luis E Mcdaniels MD:    Thank you for your recent referral of Victor Manuel Mcmanus. Please review the attached evaluation summary from Victor Manuel's recent visit.     Please verify that you agree with the plan of care by signing the attached order.     If you have any questions or concerns, please do not hesitate to call.     I sincerely appreciate the opportunity to share in the care of one of your patients and hope to have another opportunity to work with you in the near future.       Sincerely,    Oma Chowdary, PT      Referring Provider:      I certify that I have read the below Plan of Care and certify the need for these services furnished under this plan of treatment while under my care.                    Luis E Mcdaniels MD  [address]  [address]  [place] [address]  Via Fax: [phone]          Daily Note     Today's date: 2025  Patient name: Victor Manuel Mcmanus  : 1950  MRN: [id number]  Referring provider: No ref. provider found  Dx:   Encounter Diagnosis     ICD-10-CM    1. Occipitotemporal infarct, acute (HCC)  I63.9           Start Time: 0800  Stop Time: 0845  Total time in clinic (min): 45 minutes    Subjective: pts wife repots the biggest challenge for the pt at this time is depth perception and environmental navigation. Wife reports that she is having to cue him less often to look around to watch out for something however every so often she will have to cue him. She reports she does have to cue him when a curb is upcoming so he does not trip and fall on it. Pt reports descending steps is still a challenge for him and reports this is mainly due to his knee. Pt reports that uneven surfaces are challenging for him like walking  on pavement w a SPC      Objective: See treatment diary below      Assessment: Tolerated treatment well. Patient would benefit from continued PT Pt has attended 8 weeks physical therapy sessions including IE and is demonstrating progress towards goals.  Pt has met 5/5 STG and 4/4 LTG at this time Pt has improved their 6mwt distance from 808 feet to 850 ft indicating improved aerobic capacity and activity tolerance. Pts TUG time of 10.03 s indicates pt is not at risk for falls based on this test as socres > 13.5 s are considered at risk. pts FGA score has improved from 24/30 to 25/30 reducing pts fall risk however still placing them at risk for falls as scores < 23 are considered at risk for falls. Pts 5x STS tests has improved from 22.28 s UE to 13.72 no UE s indicating improved LE strength and power. Pts 3m bwd walking time has remained consistent from PN from 5.22 to 5.44 s (however has improved from >9 s on eval) indicating pt is at inc risk for falls as scores > 4.5 s are at risk for falls. Additionally pts 4 square step test time has improved from 12s however stepping on canes to 14s however no stepping on canes indicating pt is at inc risk for falls as times >10.4s are considered pts identified as fallers and scores > 15 s indicate pt is as risk for multiple falls.     Pt provided family edu on dec cueing pt and allowing him time to locate or attempt to locate objects inde before cueing and when cues re needed providing cues such as if you can find it which side do you think it is on to allow for more independent environmental navigation.  Pt would continue to benefit from skilled physical therapy to improve overall QOL inclusive of, strength, coordination, balance, endurance and functional mobility in the home and in the community as well as reduce their fall risk for improved safety.         Assessment  Impairments: abnormal coordination, abnormal gait, abnormal muscle tone, activity intolerance, impaired  balance, impaired physical strength, lacks appropriate home exercise program, participation limitations, activity limitations and endurance     Assessment details: Pt is a 75 year old male presenting to [facility] physical therapy for an initial evaluation secondary to CVA. Pt presents to clinic with gait and balance impairments inclusive of left side neglect and visual impairment impacting pts ability to safely navigate community and environment, MH of shoe lift on RLE and R knee fx and utilizes SPC assistive device for mobility (in the home/in the community) .       Additionally pt demonstrates strength deficits as seen with MMT scores of <5/5 and 5x STS time of 22.28 demonstrating dec LE strength and power as compared to age matched norms.  Pt is at inc risk for falls based on their FGA score of 20/30  with scores < 20/30 at inc risk for spontaneous falls and scores < 23/30 considered at risk for falls. pts TUG time of 16.0 s w SPC (verbal cues for direction of ambulation and inc time to locate cones) puts them at inc risk for falls as scores > 13.5 are considered at risk for falls.Pts 3m bwd walking time of 9,53 s  indicates pt is at inc risk for falls as scores > 4.5 s are at risk for falls.  6mwt and 10mwt to be complete NV for endurance and gait speed testing. Pt was provided edu on left sided awareness and spatial awareness when navigating environments after stroke.     Pt would benefit from skilled physical therapy to improve  overall QOL inclusive of, strength, coordination, balance, endurance and functional mobility in the home and in the community as well as reduce their fall risk for improved safety.         Goals  STG (4 weeks)   Improve TUG score of 16 by 2 seconds indicating meaningful improvement in fall risk MET  Improve FGA score of 20 by 2 indicating meaningful improvement in fall risk MET  Improve 5x STS score of 22.28 by 2.3 seconds indicating meaningful improvement in fall risk and power generation  MET  Complete 6mwt for assessment of  aerobic endurance  MET  Pt will improve 3m bwd walk time of 9.53 by 1.57s (MDC) to demonstrate an improvement in balance and a reduction in fall risk MET        LTG (8 weeks)   Improve TUG score to <13.5 seconds indicating meaningful improvement in fall risk MET  Improve FGA score to > 23/30 indicating meaningful improvement in fall risk MET  Improve/Reduce 5x STS score to < 18 seconds seconds indicating meaningful improvement in fall risk and power generation MET  Pt will improve 3m bwd walk time of 9.53  to <6 to demonstrate an improvement in balance and a reduction in fall risk MET    New goals  STG (4 weeks)    Increase 6MWT of 850 by 82 ft indicating meaningful change in aerobic endurance   Patient will improve gait speed to 0.95 to improve (household/community distance ambulation) and reduce pts fall risk  Pt will improve 4 square step test time of 14 by 4.6s (MDC) to demonstrate an improvement in balance and a reduction in pts fall risk      LTG (8 weeks)     Patient will improve gait speed to >1 to improve functional community ambulation and reduce pts fall risk  Pt will improve 3m bwd walk time of 5.44  to <4.5 to demonstrate an improvement in balance and a reduction in fall risk  Improve safety awareness with dec in left sided errors when walking outside (running into left sided obstacles or walking too close to edge of side walk impacting safety)               Plan  Patient would benefit from: skilled physical therapy     Planned therapy interventions: balance, gait training, graded activity, graded exercise, home exercise program, neuromuscular re-education, patient/caregiver education, strengthening, stretching, therapeutic activities and therapeutic exercise     Frequency: 2x week  Duration in weeks: 8  Plan of Care beginning date: 6/4/2025  Plan of Care expiration date: 7/30/2025  Treatment plan discussed with: patient        Subjective Evaluation     History of  Present Illness  Mechanism of injury: Per epic chart review:  Repeat CT of the head on 2/24 showed redemonstration of evolving infarct in the right occipital and posterior temporal lobes, now extending to the right parietal lobe. There are small areas of hemorrhagic transformation in these these regions of infarction. There is substantial cytotoxic edema in these regions with sulcal effacement and on the right as well as and effacement of the portions of the right lateral ventricle. Additional small acute infarct in the inferior aspect of right cerebellar hemisphere, small infarct in the right thalamus. [facility] was consulted for GOC and patient remained treatment focused.     Per pt report:  He is doing okay in terms of his walking and balance but has to take his time. Pt pt is currently using an SPC self reported about 60% of the time ut was not using it pretty much at all before. Pt reports he has been able to use the stairs with a railing. Pt has been walking on various surfaces (grass side walk gravel) with PT when he was in the hospital and has been doing well with that. Wife reports left sided inattention  Patient Goals  Patient goals for therapy: improved balance  Patient goal: improved independence  Pain  No pain reported           Objective        Balance Test IE 5/6 6/4      6 Minute Walk Test (ft): 702 808 850      2 Minute Walk Test (ft):         Gait Speed (m/s): 0.62 0.94 0.89      5x Sit To Stand (s): 22.28 ue  13.72 no UE      TUG (s) 16 spc 11.54 no AD 10.03      FGA  3 m bwd walk 20  9.53 24  5.22 25  5.44      Mini BEST         4 square step test  12.85s 1 step on canes 14.8 no stepping on canes             Short Term Goal Expiration Date:(6/2/25)  Long Term Goal Expiration Date: (7/30/25)  POC Expiration Date: (7/30/25)        POC expires Unit limit Auth Expiration date PT/OT/ST + Visit Limit?   7/30/25 6 neuro 12/31/25 BOMN                           Visit/Unit Tracking  AUTH Status:  Date 5/20  5/21 5/27 5/29 6/4 4/29 4/30 5/6 5/8 5.13 5/14   None PN every 10 Used  5/10 6/10 7/10 8/10 6/10 7/10 8/10 1/10 2/10 3/10    Remaining  4/10                   Precautions hx CVA, afb, HTN DM2, left side neglect        Manuals 6/4 5/20 5/21 5/27 5/29                                   Neuro Re-Ed  Fga  3m bwd walk  4 square step test  10mwt  Tug    Edu on increasing independence w family as described above       Multi directional stepping   Solo  Hex ring step in correct color based on pod  2 min x2 rounds Solo  Weave in and out of cones w 8'' hurdles between   X3 laps    Fwd bwd weaving 5 cones  X3 laps    Uneven surface Walking across grass, sidewalk CGA via GB  X6 min       TM  SOLO  1.6 mph x8 min BUE use    0.6 x3 min no UE  SOLO  1.6 mph x8 min BUE use    Bean bags on TM belt for reactive stepping 1.5 mph (slight speed dec due to no SOLO)  BUE   X10 min neural priming    Environment navigation/scanning  Solo  Dispersed color dots bounce basketball to correct color dot based on blaze pod (most dots on L)  X15 taps Solo  Static balance on airex  Basketball bounce pass  X20    Racket balloon pass   X2 min racket in L hand  X2 min racket in R hand    TM 1.2 mph playing cards n pts right playing cad board on the L  X10 min Racket balloon pass   X2 min racket in L hand  X2 min racket in R hand    4 blaze pods in square 1 min 30s x2 rounds Solo  6 pods 3 on mirror on pts left at varied heights 3 on floor (one left 2 in front of pt  2 min x3 rounds    Solo  Static balance on airex  Basketball bounce pass  X20   tandem  SOLO  X3 laps  SOLO  X3 laps      hurdles  SOLO  8''  X3 laps fwd   SOLO  8''  X2 laps fwd  X2 laps laterl   Dual task  wAlk fwd and bwd w basketball bounce inc bounces to the left side  X5 laps      Ther Ex 6mwt  5x sts                                                                       Ther Activity                        Gait Training        Treadmill                 Modalities

## 2025-06-06 ENCOUNTER — OFFICE VISIT (OUTPATIENT)
Dept: OCCUPATIONAL THERAPY | Facility: CLINIC | Age: 75
End: 2025-06-06
Payer: MEDICARE

## 2025-06-06 ENCOUNTER — OFFICE VISIT (OUTPATIENT)
Dept: PHYSICAL THERAPY | Facility: CLINIC | Age: 75
End: 2025-06-06
Payer: MEDICARE

## 2025-06-06 DIAGNOSIS — I63.9 OCCIPITOTEMPORAL INFARCT, ACUTE (HCC): Primary | ICD-10-CM

## 2025-06-06 DIAGNOSIS — I63.9 CEREBROVASCULAR ACCIDENT (CVA), UNSPECIFIED MECHANISM (HCC): Primary | ICD-10-CM

## 2025-06-06 PROCEDURE — 97112 NEUROMUSCULAR REEDUCATION: CPT

## 2025-06-06 NOTE — PROGRESS NOTES
Daily Note     Today's date: 2025  Patient name: Victor Manuel Mcmanus  : 1950  MRN: [id number]  Referring provider: Luis E Mcdaniels MD  Dx:   Encounter Diagnosis     ICD-10-CM    1. Occipitotemporal infarct, acute (HCC)  I63.9           Start Time: 0800  Stop Time: 0845  Total time in clinic (min): 45 minutes    Subjective: pt reprots he is tired today but doing well      Objective: See treatment diary below      Assessment: Tolerated treatment well. Patient would benefit from continued PT continued focus on left sided attention w balance challenges. Airex added to standing pod activity for added balance challenge as pt is required to shift weight and turn to the left and momentary SLS balance as pt taps pod. No sig balance difficulty noted w rotation on foam however pt challenged to tap pod with foot from complaint surface. Pt continues to improvme w lwft attention w balloon pass missing 2-3 balloons due to in attention. Pt challenge pt w color block sorting w PT holding block and changing location each trial so pt is required to scan for therapist.      Plan: Continue per plan of care.      Short Term Goal Expiration Date:(25)  Long Term Goal Expiration Date: (25)  POC Expiration Date: (25)        POC expires Unit limit Auth Expiration date PT/OT/ST + Visit Limit?   25 6 neuro 25 BOMN                           Visit/Unit Tracking  AUTH Status:  Date  6/ 58 5.13    None PN every 10 Used  5/10 6/10 7/10 8/10 1/10 7/10 8/10 1/10 2/10 3/10    Remaining  4/10                   Precautions hx CVA, afb, HTN DM2, left side neglect        Manuals                                    Neuro Re-Ed  Fga  3m bwd walk  4 square step test  10mwt  Tug    Edu on increasing independence w family as described above       Multi directional stepping   Solo  Hex ring step in correct color based on pod  2 min x2 rounds Solo  Weave in and out of cones w  8'' hurdles between   X3 laps    Fwd bwd weaving 5 cones  X3 laps    Uneven surface Walking across grass, sidewalk CGA via GB  X6 min       TM  SOLO  1.6 mph x8 min BUE use  neural priming   SOLO  1.6 mph x8 min BUE use    Bean bags on TM belt for reactive stepping 1.5 mph (slight speed dec due to no SOLO)  BUE   X10 min neural priming    Environment navigation/scanning  Solo  6 pods 3 on mirror on pts left at varied heights 3 on floor (one left 2 in front of pt  2 min x3 rounds (10 taps, 18, 21)  Standing on airex    Racket balloon pass   X2 min racket in L hand  X2 min racket in R hand    Block sorting to color dots, therapist hold blocks to pass to pt and changes standing location each trial  X30 blocks Solo  Static balance on airex  Basketball bounce pass  X20    Racket balloon pass   X2 min racket in L hand  X2 min racket in R hand    TM 1.2 mph playing cards n pts right playing cad board on the L  X10 min Racket balloon pass   X2 min racket in L hand  X2 min racket in R hand    4 blaze pods in square 1 min 30s x2 rounds Solo  6 pods 3 on mirror on pts left at varied heights 3 on floor (one left 2 in front of pt  2 min x3 rounds    Solo  Static balance on airex  Basketball bounce pass  X20   tandem   SOLO  X3 laps      hurdles     SOLO  8''  X2 laps fwd  X2 laps laterl   Dual task        Ther Ex 6mwt  5x sts                                                                       Ther Activity                        Gait Training        Treadmill                 Modalities

## 2025-06-06 NOTE — PROGRESS NOTES
"dOccupational Therapy Daily Note:    Today's date: 2025  Patient name: Victor Manuel Mcmanus  : 1950  MRN: [id number]  Referring provider: Luis E Mcdaniels MD  Dx:   Encounter Diagnosis   Name Primary?    Cerebrovascular accident (CVA), unspecified mechanism (HCC) Yes       Start Time: 0845  Stop Time: 0930  Total time in clinic (min): 45 minutes    POC expires Unit limit Auth Expiration date PT/OT/ST + Visit Limit?   6/3/25 N/A 25 BOMN                           Visit/Unit Tracking  AUTH Status:  Date  PN    BOMN Used 1 2 3 4 5 6 7 8 9 1 2    Remaining  7 6 5 4 3 2 1 0 0 7 6      Visit/Unit Tracking  AUTH Status:  Date  RE          BOMN Used 3 4 5 6 1          Remaining  5 4 3 2 7              Duration in weeks: 8 weeks  Plan of care beginning date: 25  Plan of care expiration date: 25  PN: 7/3/25      Precautions: L side inattention,     Subjective: \"I can't always find these letters.\"    Objective: See treatment below.  Pt participated in skilled OT session focusing on vision re-training, visual endurance, /VM skills, for increased and safety during ADL/IADL tasks.    Neuro Re-ed:   Pt engaged in multi step task to roll dice>identify word>retrieve and place pattern tiles to form word>retrieve and place number cards correlating to letter in word (trailing) completing 5 trials. Letter tiles and kris cards scattered table top facilitating cluttered environment and increased visual stimuli.     Assessment: Tolerated treatment well. Pt required 1:1 assist to identify words, sequence task and required frequent cues to attend to left to identify letter tiles and kris cards. Patient would benefit from continued skilled OT.    Plan: Continued skilled OT per POC    "

## 2025-06-09 ENCOUNTER — OFFICE VISIT (OUTPATIENT)
Dept: PHYSICAL THERAPY | Facility: CLINIC | Age: 75
End: 2025-06-09
Payer: MEDICARE

## 2025-06-09 ENCOUNTER — OFFICE VISIT (OUTPATIENT)
Dept: OCCUPATIONAL THERAPY | Facility: CLINIC | Age: 75
End: 2025-06-09
Payer: MEDICARE

## 2025-06-09 DIAGNOSIS — I63.9 CEREBROVASCULAR ACCIDENT (CVA), UNSPECIFIED MECHANISM (HCC): Primary | ICD-10-CM

## 2025-06-09 DIAGNOSIS — I63.9 OCCIPITOTEMPORAL INFARCT, ACUTE (HCC): Primary | ICD-10-CM

## 2025-06-09 PROCEDURE — 97112 NEUROMUSCULAR REEDUCATION: CPT

## 2025-06-09 PROCEDURE — 97530 THERAPEUTIC ACTIVITIES: CPT

## 2025-06-09 NOTE — PROGRESS NOTES
Daily Note     Today's date: 2025  Patient name: Victor Manuel Mcmanus  : 1950  MRN: [id number]  Referring provider: No ref. provider found  Dx:   Encounter Diagnosis     ICD-10-CM    1. Occipitotemporal infarct, acute (HCC)  I63.9           Start Time: 845  Stop Time: 930  Total time in clinic (min): 45 minutes    Subjective: pt reports he is doing well today but was emotional in OT today pt reports he can feel like a burden and continues to strive towards goal of inc independence       Objective: See treatment diary below      Assessment: Tolerated treatment well. Patient would benefit from continued PT TM performed at start f session for neural priming and optimal learning of skills throughout session. Last 5 min of TM ambulation adde in environmental scanning of objects of given color. Pt initial located objects on his right side, pt cued to locate items on L for improved scanning. Remainder of session focused on environmental interaction and track items in motion mainly on pts L side as pt is improving w locating static items placed on his L. Objects in motion such as ball toss soccer ball or moving therapist pose an inc challenge for pt.       Plan: Continue per plan of care.      Short Term Goal Expiration Date:(25)  Long Term Goal Expiration Date: (25)  POC Expiration Date: (25)        POC expires Unit limit Auth Expiration date PT/OT/ST + Visit Limit?   25 6 neuro 25 BOMN                           Visit/Unit Tracking  AUTH Status:  Date  5.13    None PN every 10 Used  5/10 6/10 7/10 8/10 1/10 2/10 8/10 1/10 2/10 3/10    Remaining  4/10                   Precautions hx CVA, afb, HTN DM2, left side neglect        Manuals                                    Neuro Re-Ed  Fga  3m bwd walk  4 square step test  10mwt  Tug    Edu on increasing independence w family as described above       Multi directional stepping     Solo  Weave in and out of cones w 8'' hurdles between   X3 laps    Fwd bwd weaving 5 cones  X3 laps    Uneven surface Walking across grass, sidewalk CGA via GB  X6 min       TM  SOLO  1.6 mph x8 min BUE use  neural priming  SOLO  1.5 mph 6% grade neural priming  Final 5 min searing for items in room of certain color  X10 min total SOLO  1.6 mph x8 min BUE use    Bean bags on TM belt for reactive stepping 1.5 mph (slight speed dec due to no SOLO)  BUE   X10 min neural priming    Environment navigation/scanning  Solo  6 pods 3 on mirror on pts left at varied heights 3 on floor (one left 2 in front of pt  2 min x3 rounds (10 taps, 18, 21)  Standing on airex    Racket balloon pass   X2 min racket in L hand  X2 min racket in R hand    Block sorting to color dots, therapist hold blocks to pass to pt and changes standing location each trial  X30 blocks   Block sorting to color dots, therapist hold blocks to pass to pt and changes standing location each trial  X30 blocks    Soccer ball kick (incorporating reactive balance/stepping)  X5 min    Ball toss volley ball x5 tosses  Beach ball x20 tosses Racket balloon pass   X2 min racket in L hand  X2 min racket in R hand    4 blaze pods in square 1 min 30s x2 rounds    6# MB slam  On correct color dot  x20 Solo  6 pods 3 on mirror on pts left at varied heights 3 on floor (one left 2 in front of pt  2 min x3 rounds    Solo  Static balance on airex  Basketball bounce pass  X20   tandem        hurdles     SOLO  8''  X2 laps fwd  X2 laps laterl   Dual task        Ther Ex 6mwt  5x sts                                                                       Ther Activity                        Gait Training        Treadmill                 Modalities

## 2025-06-09 NOTE — PROGRESS NOTES
"dOccupational Therapy Daily Note:    Today's date: 2025  Patient name: Victor Manuel Mcmanus  : 1950  MRN: [id number]  Referring provider: No ref. provider found  Dx:   Encounter Diagnosis   Name Primary?    Cerebrovascular accident (CVA), unspecified mechanism (HCC) Yes                POC expires Unit limit Auth Expiration date PT/OT/ST + Visit Limit?   6/3/25 N/A 25 BOMN                           Visit/Unit Tracking  AUTH Status:  Date 4/8 4/16 4/22 4/23 4/29 4/30 5/6 5/8 5/13 PN    BOMN Used 1 2 3 4 5 6 7 8 9 1 2    Remaining  7 6 5 4 3 2 1 0 0 7 6      Visit/Unit Tracking  AUTH Status:  Date  RE         BOMN Used 3 4 5 6 1 2         Remaining  5 4 3 2 7 6             Duration in weeks: 8 weeks  Plan of care beginning date: 25  Plan of care expiration date: 25  PN: 7/3/25      Precautions: L side inattention,     Subjective: \"I talked to my family about getting a soccer ball, I want to kick it and play with my 12 year old grandkid\"    Objective: See treatment below.  Pt participated in skilled OT session focusing on vision re-training, visual endurance, /VM skills, for increased and safety during ADL/IADL tasks.    Neuro Re-ed:   Pt engaged in detectives looking chart with 3 object card positioned on L side of table, pt instructed to scan to L to look at card, study it, and then recall to find on chart. Pt good ability to recall each picture from card cued to scan to the L when unable to find picture.     There act: Spent ample time discussing with Pt thoughts and feelings of how he is progressing in therapy and in his daily life. Pt reporting that he feels he can do many things at home but often feels like a burden. Pt educated that continuing to work through therapy and do things at home with assist in progression. Pt emotional during session with good ability to talk through feelings of vision being biggest barrier. Pt provided hand out for neuro " ophthalmologist.     Assessment: Tolerated treatment well. Pt required 1:1 assist and verbal cues to continue looking to the L side to scan and find card with pictures on it. Increased time to find all pictures positioned on the L side of the board. Pt provided hand out for neuro ophthalmologist with him and wife educated.     Plan: Continued skilled OT per POC

## 2025-06-11 ENCOUNTER — OFFICE VISIT (OUTPATIENT)
Dept: OCCUPATIONAL THERAPY | Facility: CLINIC | Age: 75
End: 2025-06-11
Payer: MEDICARE

## 2025-06-11 ENCOUNTER — OFFICE VISIT (OUTPATIENT)
Dept: PHYSICAL THERAPY | Facility: CLINIC | Age: 75
End: 2025-06-11
Payer: MEDICARE

## 2025-06-11 DIAGNOSIS — I63.9 CEREBROVASCULAR ACCIDENT (CVA), UNSPECIFIED MECHANISM (HCC): Primary | ICD-10-CM

## 2025-06-11 DIAGNOSIS — I63.9 OCCIPITOTEMPORAL INFARCT, ACUTE (HCC): Primary | ICD-10-CM

## 2025-06-11 PROCEDURE — 97112 NEUROMUSCULAR REEDUCATION: CPT

## 2025-06-11 NOTE — PROGRESS NOTES
Daily Note     Today's date: 2025  Patient name: Victor Manuel Mcmanus  : 1950  MRN: [id number]  Referring provider: No ref. provider found  Dx:   Encounter Diagnosis     ICD-10-CM    1. Occipitotemporal infarct, acute (HCC)  I63.9           Start Time: 0800  Stop Time: 0845  Total time in clinic (min): 45 minutes    Subjective: pt reports he is doing well today no sig changes      Objective: See treatment diary below      Assessment: Tolerated treatment well. Patient would benefit from continued PT in addition to exercise priming w inc HR levels TM utilized for environmental scanning while walking, pt did demonstrates dec speed when dual task was added however accurate scanning of environment noted w inc cues for left scanning provided to address in attention. Progressed environment scanned to a busier environment and a smaller target (card) pt did find card on right prior to left.      Plan: Continue per plan of care.      Short Term Goal Expiration Date:(25)  Long Term Goal Expiration Date: (25)  POC Expiration Date: (25)        POC expires Unit limit Auth Expiration date PT/OT/ST + Visit Limit?   25 6 neuro 25 BOMN                           Visit/Unit Tracking  AUTH Status:  Date  5.13    None PN every 10 Used  5/10 6/10 7/10 8/10 1/10 2/10 3/10 1/10 2/10 3/10    Remaining  4/10                   Precautions hx CVA, afb, HTN DM2, left side neglect        Manuals                                    Neuro Re-Ed  Fga  3m bwd walk  4 square step test  10mwt  Tug    Edu on increasing independence w family as described above       Multi directional stepping        Uneven surface Walking across grass, sidewalk CGA via GB  X6 min       TM  SOLO  1.6 mph x8 min BUE use  neural priming  SOLO  1.5 mph 6% grade neural priming  Final 5 min searing for items in room of certain color  X10 min total SOLO  1.5 mph 6% grade neural  priming  Final 5 min searching for items in room of certain color (incorporating HT)  X10 min total  138 bpm 1.5 mph (slight speed dec due to no SOLO)  BUE   X10 min neural priming    Environment navigation/scanning  Solo  6 pods 3 on mirror on pts left at varied heights 3 on floor (one left 2 in front of pt  2 min x3 rounds (10 taps, 18, 21)  Standing on airex    Racket balloon pass   X2 min racket in L hand  X2 min racket in R hand    Block sorting to color dots, therapist hold blocks to pass to pt and changes standing location each trial  X30 blocks   Block sorting to color dots, therapist hold blocks to pass to pt and changes standing location each trial  X30 blocks    Soccer ball kick (incorporating reactive balance/stepping)  X5 min    Ball toss volley ball x5 tosses  Beach ball x20 tosses 10 cards dispersed in a busy environment pt to find all 10 and lace them on matching board     4 pods 5 different color cones, find the correct color cone to match the light  2 min x3 rounds Solo  6 pods 3 on mirror on pts left at varied heights 3 on floor (one left 2 in front of pt  2 min x3 rounds    Solo  Static balance on airex  Basketball bounce pass  X20   tandem        hurdles     SOLO  8''  X2 laps fwd  X2 laps laterl   Dual task        Ther Ex 6mwt  5x sts                                                                       Ther Activity                        Gait Training        Treadmill                 Modalities

## 2025-06-11 NOTE — PROGRESS NOTES
"dOccupational Therapy Daily Note:    Today's date: 2025  Patient name: Victor Manuel Mcmanus  : 1950  MRN: [id number]  Referring provider: No ref. provider found  Dx:   Encounter Diagnosis   Name Primary?    Cerebrovascular accident (CVA), unspecified mechanism (HCC) Yes                  POC expires Unit limit Auth Expiration date PT/OT/ST + Visit Limit?   6/3/25 N/A 25 BOMN                           Visit/Unit Tracking  AUTH Status:  Date 4/8 4/16 4/22 4/23 4/29 4/30 5/6 5/8 5/13 PN    BOMN Used 1 2 3 4 5 6 7 8 9 1 2    Remaining  7 6 5 4 3 2 1 0 0 7 6      Visit/Unit Tracking  AUTH Status:  Date  RE        BOMN Used 3 4 5 6 1 2 3        Remaining  5 4 3 2 7 6 5            Duration in weeks: 8 weeks  Plan of care beginning date: 25  Plan of care expiration date: 25  PN: 7/3/25      Precautions: L side inattention,     Subjective: \"Im okay\"    Objective: See treatment below.  Pt participated in skilled OT session focusing on vision re-training, visual endurance, /VM skills, for increased and safety during ADL/IADL tasks.    Neuro Re-ed:   Pt seated at table working on L side scanning, attention to L side, sequencing, direction following, and problem solving. Pt using red therapin with R hand to  marbles and place on top of numbers and letters under PW. Numbers positioned on R side and letters on L side. Pt sequencing alternating between numbers and letters, instructed to scan to L. Slight challenge to find letter directly past midline on L side, however minimal verbal cues overall for sequencing and scanning.     Ended session with Pt completing 1 set of rush hour. With card positioned on L side and board at midline, pt instructed to place cars into board following card design. Pt with maximal difficulty to place into correct spot on board with max verbal cues to look at the way to the L of the board. Pt then required max assist to work through game " to get car out.     Assessment: Tolerated treatment well. Pt has upcoming neurology appt with [facility] on 6/17/25 and will discuss meeting with neuro ophthalmologist. Pt required minimal assistance throughout with verbal cues to scan to L side, however overall able to complete with good problem solving and increased scanning. Pt often able to come up with next in sequencing, asking for confirmation he is on the R one and 90% of the time he was. Increased time overall to complete task.     Plan: Continued skilled OT per POC

## 2025-06-16 ENCOUNTER — OFFICE VISIT (OUTPATIENT)
Dept: OCCUPATIONAL THERAPY | Facility: CLINIC | Age: 75
End: 2025-06-16
Payer: MEDICARE

## 2025-06-16 ENCOUNTER — OFFICE VISIT (OUTPATIENT)
Dept: PHYSICAL THERAPY | Facility: CLINIC | Age: 75
End: 2025-06-16
Payer: MEDICARE

## 2025-06-16 DIAGNOSIS — I63.9 OCCIPITOTEMPORAL INFARCT, ACUTE (HCC): Primary | ICD-10-CM

## 2025-06-16 DIAGNOSIS — I63.9 CEREBROVASCULAR ACCIDENT (CVA), UNSPECIFIED MECHANISM (HCC): Primary | ICD-10-CM

## 2025-06-16 PROCEDURE — 97530 THERAPEUTIC ACTIVITIES: CPT

## 2025-06-16 PROCEDURE — 97112 NEUROMUSCULAR REEDUCATION: CPT

## 2025-06-16 NOTE — PROGRESS NOTES
Daily Note     Today's date: 2025  Patient name: Victor Manuel Mcmanus  : 1950  MRN: [id number]  Referring provider: No ref. provider found  Dx:   Encounter Diagnosis     ICD-10-CM    1. Occipitotemporal infarct, acute (HCC)  I63.9           Start Time: 0800  Stop Time: 0845  Total time in clinic (min): 45 minutes    Subjective: pt reports he is feeling a little down today about is stroke, PT discusses having a conversation with his doctor about how he is feeling and he denies feeling like he need to talk to someone, denies any thoughts of hurting or harming himself and says he just feels like it is a tough morning. PT discusses with pt that if he starts to feel worse that he should let me or another clinician/ physician know and we can get him any help that he may need. Pt expresses verbal understanding       Objective: See treatment diary below      Assessment: Tolerated treatment well. Patient would benefit from continued PT pt demonstrates fatigue w inc effort and inc respiratory rate observed around 6 min, decline in incline back to flat ground. Pt demonstrates errors on bean bags on left side of TM approx 20% of the time. PT has pt call out color of bean bag to assess if pt is recognizes all bean bags thrown. Pt demonstrates difficulty w consistently bouncing ball w dual task limited. Pt demonstrates fatigue w natalie. Milti directional exercise however errors noted in balance and object location/ navigation is improving.      Plan: Continue per plan of care.      Short Term Goal Expiration Date:(25)  Long Term Goal Expiration Date: (25)  POC Expiration Date: (25)        POC expires Unit limit Auth Expiration date PT/OT/ST + Visit Limit?   25 6 neuro 25 BOMN                           Visit/Unit Tracking  AUTH Status:  Date  6 6 6/ 616 5.13    None PN every 10 Used  5/10 6/10 7/10 8/10 1/10 2/10 3/10 4/10 2/10 3/10    Remaining  4/10                    Precautions hx CVA, afb, HTN DM2, left side neglect        Manuals 6/4 6/6 6/9 6/11 6/16                                   Neuro Re-Ed  Fga  3m bwd walk  4 square step test  10mwt  Tug    Edu on increasing independence w family as described above       Multi directional stepping     SOLO  4 cones 5 hurdles weaving in and out ov cones over 6'' hurdles   X4 laps fwd   Uneven surface Walking across grass, sidewalk CGA via GB  X6 min       TM  SOLO  1.6 mph x8 min BUE use  neural priming  SOLO  1.5 mph 6% grade neural priming  Final 5 min searing for items in room of certain color  X10 min total SOLO  1.5 mph 6% grade neural priming  Final 5 min searching for items in room of certain color (incorporating HT)  X10 min total  138 bpm SOLO  1.5 mph 6% grade 6 min  4 min 0% grade  Bean bags on TM x5 min  Color call out of maritza bags x2 min   Environment navigation/scanning  Solo  6 pods 3 on mirror on pts left at varied heights 3 on floor (one left 2 in front of pt  2 min x3 rounds (10 taps, 18, 21)  Standing on airex    Racket balloon pass   X2 min racket in L hand  X2 min racket in R hand    Block sorting to color dots, therapist hold blocks to pass to pt and changes standing location each trial  X30 blocks   Block sorting to color dots, therapist hold blocks to pass to pt and changes standing location each trial  X30 blocks    Soccer ball kick (incorporating reactive balance/stepping)  X5 min    Ball toss volley ball x5 tosses  Beach ball x20 tosses 10 cards dispersed in a busy environment pt to find all 10 and lace them on matching board     4 pods 5 different color cones, find the correct color cone to match the light  2 min x3 rounds SOLO  6 pods dispersed w basketball bounce  2 min x2 rounds    Soccer ball kick (incorporating reactive balance/stepping)  X5 min   tandem        hurdles        Dual task        Ther Ex 6mwt  5x sts                                                                       Ther Activity                         Gait Training        Treadmill                 Modalities

## 2025-06-16 NOTE — PROGRESS NOTES
"dOccupational Therapy Daily Note:    Today's date: 2025  Patient name: Victor Manuel Mcmanus  : 1950  MRN: [id number]  Referring provider: No ref. provider found  Dx:   Encounter Diagnosis   Name Primary?    Cerebrovascular accident (CVA), unspecified mechanism (HCC) Yes                    POC expires Unit limit Auth Expiration date PT/OT/ST + Visit Limit?   6/3/25 N/A 25 BOMN                           Visit/Unit Tracking  AUTH Status:  Date 4/8 4/16 4/22 4/23 4/29 4/30 5/6 5/8 5/13 PN    BOMN Used 1 2 3 4 5 6 7 8 9 1 2    Remaining  7 6 5 4 3 2 1 0 0 7 6      Visit/Unit Tracking  AUTH Status:  Date  RE       BOMN Used 3 4 5 6 1 2 3 4       Remaining  5 4 3 2 7 6 5 4           Duration in weeks: 8 weeks  Plan of care beginning date: 25  Plan of care expiration date: 25  PN: 7/3/25      Precautions: L side inattention,     Subjective: \"I think I went to Xspand we did it early\"    Objective: See treatment below.  Pt participated in skilled OT session focusing on vision re-training, visual endurance, /VM skills, for increased and safety during ADL/IADL tasks.    Neuro Re-ed:  Pt seated at table working on scanning patters, recognition of L side, attention, and direction following. Pt instructed to look to L side, find next word in word bank, find in word search at midline, and then find word on table in clutter on R side. Pt with no difficulty noted to find word in word search and in clutter on R side. Occasional VC needed to scan to L with scattered scanning pattern to find word bank on slant board to the L.     There act: Pt then completing 50% of pixy cube design with max assist to understand to look for each piece and turn to find correct colors as well as then place in with correct orientation.     Assessment: Tolerated treatment well. Pt with fair scanning to L side with scattered L side search pattern. Pt to follow up on Wednesday post " neurology visit with updates.     Plan: Continued skilled OT per POC

## 2025-06-18 ENCOUNTER — OFFICE VISIT (OUTPATIENT)
Dept: OCCUPATIONAL THERAPY | Facility: CLINIC | Age: 75
End: 2025-06-18
Payer: MEDICARE

## 2025-06-18 ENCOUNTER — OFFICE VISIT (OUTPATIENT)
Dept: PHYSICAL THERAPY | Facility: CLINIC | Age: 75
End: 2025-06-18
Payer: MEDICARE

## 2025-06-18 DIAGNOSIS — I63.9 OCCIPITOTEMPORAL INFARCT, ACUTE (HCC): Primary | ICD-10-CM

## 2025-06-18 DIAGNOSIS — I63.9 CEREBROVASCULAR ACCIDENT (CVA), UNSPECIFIED MECHANISM (HCC): Primary | ICD-10-CM

## 2025-06-18 PROCEDURE — 97112 NEUROMUSCULAR REEDUCATION: CPT

## 2025-06-18 PROCEDURE — 97530 THERAPEUTIC ACTIVITIES: CPT

## 2025-06-18 NOTE — PROGRESS NOTES
Daily Note     Today's date: 2025  Patient name: Victor Manuel Mcmanus  : 1950  MRN: [id number]  Referring provider: No ref. provider found  Dx:   Encounter Diagnosis     ICD-10-CM    1. Occipitotemporal infarct, acute (HCC)  I63.9           Start Time: 0800  Stop Time: 0845  Total time in clinic (min): 45 minutes    Subjective: pt appears fatigued this morning reports he is doing okay      Objective: See treatment diary below      Assessment: Tolerated treatment well. Patient would benefit from continued PT pt fatigued w ambulation on an incline as seen w inc fwd lean onto UE and inc respiratory rate at around 6 min 30s of walking up hill, dec to level ambulation for remainder of TM time. Pt demonstrated a tricia error rate w 12'' hurdles on first round of obstacle course, dec to 8'' for improved success rate however still a challenge for pt. Pt struggled w varried task of steps and hurdles together w locating blaze pods, possibly dec complexity of task NV. Pt also demonstrates standing all the matthias to right side of box w step ups, chalk lines drawn on box for orientation to center and practiced isolated repeated task of steps.       Plan: Continue per plan of care.      Short Term Goal Expiration Date:(25)  Long Term Goal Expiration Date: (25)  POC Expiration Date: (25)        POC expires Unit limit Auth Expiration date PT/OT/ST + Visit Limit?   25 6 neuro 25 BOMN                           Visit/Unit Tracking  AUTH Status:  Date    None PN every 10 Used  5/10 6/10 7/10 8/10 1/10 2/10 3/10 /10 5/10 310    Remaining  4/10                   Precautions hx CVA, afb, HTN DM2, left side neglect        Manuals  6 6                                   Neuro Re-Ed         Multi directional stepping     SOLO  4 cones 5 hurdles weaving in and out ov cones over 6'' hurdles   X4 laps fwd   Uneven surface        TM SOLO  1.5 mph  6%  grade 6.5 min  Dec 0% grade   X10 min total  100 bpm 96% spo2 at eng HIGT SOLO  1.6 mph x8 min BUE use  neural priming  SOLO  1.5 mph 6% grade neural priming  Final 5 min searing for items in room of certain color  X10 min total SOLO  1.5 mph 6% grade neural priming  Final 5 min searching for items in room of certain color (incorporating HT)  X10 min total  138 bpm SOLO  1.5 mph 6% grade 6 min  4 min 0% grade  Bean bags on TM x5 min  Color call out of maritza bags x2 min   Environment navigation/scanning Solo   2 6'' steps  2 12'' hurdles w dispersed blaze pods  Navigate through obstacles to light  2 min x1 rounds    Dec natalie height to 8'' x2 more rounds    Solo  4 foam beams side step to called out color cone  X3 min progressed to 2 color call out Solo  6 pods 3 on mirror on pts left at varied heights 3 on floor (one left 2 in front of pt  2 min x3 rounds (10 taps, 18, 21)  Standing on airex    Racket balloon pass   X2 min racket in L hand  X2 min racket in R hand    Block sorting to color dots, therapist hold blocks to pass to pt and changes standing location each trial  X30 blocks   Block sorting to color dots, therapist hold blocks to pass to pt and changes standing location each trial  X30 blocks    Soccer ball kick (incorporating reactive balance/stepping)  X5 min    Ball toss volley ball x5 tosses  Beach ball x20 tosses 10 cards dispersed in a busy environment pt to find all 10 and lace them on matching board     4 pods 5 different color cones, find the correct color cone to match the light  2 min x3 rounds SOLO  6 pods dispersed w basketball bounce  2 min x2 rounds    Soccer ball kick (incorporating reactive balance/stepping)  X5 min   Step up Solo  2 6'' boxes  1 4'' box   Chalk line in center of box  X4 laps fwd       hurdles        Dual task        Ther Ex                                                                        Ther Activity                        Gait Training        Treadmill                  Modalities

## 2025-06-18 NOTE — PROGRESS NOTES
"Occupational Therapy Daily Note:    Today's date: 2025  Patient name: Victor Manuel Mcmanus  : 1950  MRN: [id number]  Referring provider: No ref. provider found  Dx:   Encounter Diagnosis   Name Primary?    Cerebrovascular accident (CVA), unspecified mechanism (HCC) Yes          POC expires Unit limit Auth Expiration date PT/OT/ST + Visit Limit?   6/3/25 N/A 25 BOMN                           Visit/Unit Tracking  AUTH Status:  Date 4/8 4/16 4/22 4/23 4/29 4/30 5/6 5/8 5/13 PN    BOMN Used 1 2 3 4 5 6 7 8 9 1 2    Remaining  7 6 5 4 3 2 1 0 0 7 6      Visit/Unit Tracking  AUTH Status:  Date  RE      BOMN Used 3 4 5 6 1 2 3 4 5      Remaining  5 4 3 2 7 6 5 4 3          Duration in weeks: 8 weeks  Plan of care beginning date: 25  Plan of care expiration date: 25  PN: 7/3/25      Precautions: L side inattention,        Subjective: \"I'm doing things I need to do at home to function.\"    Objective: See treatment below.  Pt participated in skilled OT session focusing on vision re-training, visual endurance, /VM skills, for increased and safety during ADL/IADL tasks.    Neuro re-ed: Pt engaged in constructional task to assemble parquetry puzzle tabletop w/template card placed on slant board facilitating near/far saccades w/activity focus on visual tolerance, scanning into left peripheral field, /VS w/additional cog demands of pattern recognition and working memory.  Pt required to retrieve wooden puzzle shapes from bin positioned to left of pt providing additional visual stimuli when sifting through different sizes and colors to identify puzzle pieces needed to complete task.      Theract:  Session concluded with \"spot the difference\" w/cards positioned side by side requiring pt to scan b/t 2 cards to identify 6 differences.  Pt required assist 100% of time to identify differences.     Assessment: Tolerated treatment well.  Pt noted with difficulty to " identify shapes in bin w/additional clutter.  Pt utilized visual aid to track and hold place on template card. Pt required assist >50% of time for shape/pattern recognition during puzzle assembly. Patient would benefit from continued skilled OT.    Plan: Continued skilled OT per POC

## 2025-06-23 ENCOUNTER — OFFICE VISIT (OUTPATIENT)
Dept: PHYSICAL THERAPY | Facility: CLINIC | Age: 75
End: 2025-06-23
Payer: MEDICARE

## 2025-06-23 ENCOUNTER — OFFICE VISIT (OUTPATIENT)
Dept: OCCUPATIONAL THERAPY | Facility: CLINIC | Age: 75
End: 2025-06-23
Payer: MEDICARE

## 2025-06-23 DIAGNOSIS — I63.9 CEREBROVASCULAR ACCIDENT (CVA), UNSPECIFIED MECHANISM (HCC): Primary | ICD-10-CM

## 2025-06-23 DIAGNOSIS — I63.9 OCCIPITOTEMPORAL INFARCT, ACUTE (HCC): Primary | ICD-10-CM

## 2025-06-23 PROCEDURE — 97112 NEUROMUSCULAR REEDUCATION: CPT

## 2025-06-23 NOTE — PROGRESS NOTES
"Occupational Therapy Daily Note:    Today's date: 2025  Patient name: Victor Manuel Mcmanus  : 1950  MRN: [id number]  Referring provider: No ref. provider found  Dx:   Encounter Diagnosis   Name Primary?    Cerebrovascular accident (CVA), unspecified mechanism (HCC) Yes            POC expires Unit limit Auth Expiration date PT/OT/ST + Visit Limit?   6/3/25 N/A 25 BOMN                           Visit/Unit Tracking  AUTH Status:  Date 4/8 4/16 4/22 4/23 4/29 4/30 5/6 5/8 5/13 PN    BOMN Used 1 2 3 4 5 6 7 8 9 1 2    Remaining  7 6 5 4 3 2 1 0 0 7 6      Visit/Unit Tracking  AUTH Status:  Date  RE     BOMN Used 3 4 5 6 1 2 3 4 5 6     Remaining  5 4 3 2 7 6 5 4 3 2         Duration in weeks: 8 weeks  Plan of care beginning date: 25  Plan of care expiration date: 25  PN: 7/3/25      Precautions: L side inattention,        Subjective: \"I just dont get it sometimes, with the constant look to the left. I think after my stroke I lost my depth perception and could be related to the left side.\"    Objective: See treatment below.  Pt participated in skilled OT session focusing on vision re-training, visual endurance, /VM skills, for increased and safety during ADL/IADL tasks.    Neuro re-ed: Pt working on mental manipulation, direction following, scanning to the L, and delayed recall. Pt first instructed to immediately recall verbally provided 2x of 4 set words and then finding in cluttered words on table and then placing into ABC order. Pt then continued with upgraded task, of recall of words with R/R, then completing two sets of spot the difference, and then coming back to recall words, find in clutter and then put into ABC order. Pt with 50% accuracy overall for recall and sequencing, requiring occasional verbal cues, and 1 verbal cue to look to the L to find word.    Assessment: Tolerated treatment well.  Patient would benefit from continued skilled " OT.    Plan: Continued skilled OT per POC

## 2025-06-23 NOTE — PROGRESS NOTES
Daily Note     Today's date: 2025  Patient name: Victor Manuel Mcmanus  : 1950  MRN: [id number]  Referring provider: No ref. provider found  Dx:   Encounter Diagnosis     ICD-10-CM    1. Occipitotemporal infarct, acute (HCC)  I63.9           Start Time: 0802  Stop Time: 0845  Total time in clinic (min): 43 minutes    Subjective: pt reports he is doing well today and had a good weekend, no new reports      Objective: See treatment diary below      Assessment: Tolerated treatment well. Patient would benefit from continued PT PT gives pt less cues for directions of exercise to allow for greater independence and increased problem solving required for tasks. Pt requires inc time for problem solving and for location of objects on L side w/o cues but is able to perform. Initial trialed exercise w timed trials on blaze pods however switched to repetition trials to allow pt the amount of time needed to process and still complete an appropriate number of reps. Pt demonstrates 2-3 errors per lap of dot vs cone navigation w inc errors noted when pt only allowed certain color dots to step on.      Plan: Continue per plan of care.      Short Term Goal Expiration Date:(25)  Long Term Goal Expiration Date: (25)  POC Expiration Date: (25)        POC expires Unit limit Auth Expiration date PT/OT/ST + Visit Limit?   25 6 neuro 25 BOMN                           Visit/Unit Tracking  AUTH Status:  Date    None PN every 10 Used  5/10 6/10 7/10 8/10 1/10 2/10 310 /10 5/10 6/10    Remaining  10                   Precautions hx CVA, afb, HTN DM2, left side neglect        Manuals  6                                   Neuro Re-Ed         Multi directional stepping     SOLO  4 cones 5 hurdles weaving in and out ov cones over 6'' hurdles   X4 laps fwd   Uneven surface        TM SOLO  1.5 mph  6% grade 6.5 min  Dec 0% grade   X10 min total  100  bpm 96% spo2 at eng HIGT SOLO  1.5 mph  6% grade 6.5 min  Final 5 min searing for items in room of certain color  X10 min total  HIGT SOLO  1.5 mph 6% grade neural priming  Final 5 min searing for items in room of certain color  X10 min total SOLO  1.5 mph 6% grade neural priming  Final 5 min searching for items in room of certain color (incorporating HT)  X10 min total  138 bpm SOLO  1.5 mph 6% grade 6 min  4 min 0% grade  Bean bags on TM x5 min  Color call out of maritza bags x2 min   Environment navigation/scanning Solo   2 6'' steps  2 12'' hurdles w dispersed blaze pods  Navigate through obstacles to light  2 min x1 rounds    Dec natalie height to 8'' x2 more rounds    Solo  4 foam beams side step to called out color cone  X3 min progressed to 2 color call out Location of correct color block bend and stand to tap blaze pod w correct color block  2 min x1 round  5 reps x 2 rounds    Flat dots and small agility cones dispersed  Pt to step only on dots across the environment  X3 laps  Add in pt can only step on 3 colors  X5 laps   Block sorting to color dots, therapist hold blocks to pass to pt and changes standing location each trial  X30 blocks    Soccer ball kick (incorporating reactive balance/stepping)  X5 min    Ball toss volley ball x5 tosses  Beach ball x20 tosses 10 cards dispersed in a busy environment pt to find all 10 and lace them on matching board     4 pods 5 different color cones, find the correct color cone to match the light  2 min x3 rounds SOLO  6 pods dispersed w basketball bounce  2 min x2 rounds    Soccer ball kick (incorporating reactive balance/stepping)  X5 min   Step up Solo  2 6'' boxes  1 4'' box   Chalk line in center of box  X4 laps fwd       hurdles        Dual task        Ther Ex                                                                        Ther Activity                        Gait Training        Treadmill                 Modalities

## 2025-06-24 NOTE — PROGRESS NOTES
Daily Note     Today's date: 2025  Patient name: Victor Manuel Mcmanus  : 1950  MRN: [id number]  Referring provider: No ref. provider found  Dx:   Encounter Diagnosis     ICD-10-CM    1. Occipitotemporal infarct, acute (HCC)  I63.9           Start Time: 0800  Stop Time: 0845  Total time in clinic (min): 45 minutes    Subjective: pt reports he is doing well today, he is in a good mood today      Objective: See treatment diary below      Assessment: Tolerated treatment well. Patient would benefit from continued PT playing card and board utilized on TM for inc HT right to left and left environment scanning on TM. Pt did require verbal cues for which quadrant of the board he should be looking in to effectively locate card. Broke down obstacle course w blaze pods today to only include obstacle course (hurdles and step ups) for varied practice w/o the cog task of locating blaze pod as this was too challenging for pt on last trial. Pts performance improved w only variable practice, no cog task. Line on box present for midline orientation to assist with even step up. After pt familiar w obstacle course set up blaze pods added w good performance results.       Plan: Continue per plan of care.      Short Term Goal Expiration Date:(25)  Long Term Goal Expiration Date: (25)  POC Expiration Date: (25)        POC expires Unit limit Auth Expiration date PT/OT/ST + Visit Limit?   25 6 neuro 25 BOMN                           Visit/Unit Tracking  AUTH Status:  Date    None PN every 10 Used 7/10 510 6/10 7/10 8/10 1/10 2/10 3/10 /10 5/10 6/10    Remaining                     Precautions hx CVA, afb, HTN DM2, left side neglect        Manuals                                    Neuro Re-Ed         Multi directional stepping     SOLO  4 cones 5 hurdles weaving in and out ov cones over 6'' hurdles   X4 laps fwd   Uneven surface         TM SOLO  1.5 mph  6% grade 6.5 min  Dec 0% grade   X10 min total  100 bpm 96% spo2 at eng HIGT SOLO  1.5 mph  6% grade 6.5 min  Final 5 min searing for items in room of certain color  X10 min total  HIGT SOLO  1.5 mph  X10 min w playing card matching board on left SOLO  1.5 mph 6% grade neural priming  Final 5 min searching for items in room of certain color (incorporating HT)  X10 min total  138 bpm SOLO  1.5 mph 6% grade 6 min  4 min 0% grade  Bean bags on TM x5 min  Color call out of maritza bags x2 min   Environment navigation/scanning Solo   2 6'' steps  2 12'' hurdles w dispersed blaze pods  Navigate through obstacles to light  2 min x1 rounds    Dec natalie height to 8'' x2 more rounds    Solo  4 foam beams side step to called out color cone  X3 min progressed to 2 color call out Location of correct color block bend and stand to tap blaze pod w correct color block  2 min x1 round  5 reps x 2 rounds    Flat dots and small agility cones dispersed  Pt to step only on dots across the environment  X3 laps  Add in pt can only step on 3 colors  X5 laps Solo   2 6'' steps  3 8'' hurdles w dispersed blaze pods  Navigate through obstacles  X3 laps    3 blaze pods added  8 taps     6 cone weaving stepping over 6'' hurdles in between cones  X3 laps    Hex ring w blaze pod step to correct color  2 min x2 10 cards dispersed in a busy environment pt to find all 10 and lace them on matching board     4 pods 5 different color cones, find the correct color cone to match the light  2 min x3 rounds SOLO  6 pods dispersed w basketball bounce  2 min x2 rounds    Soccer ball kick (incorporating reactive balance/stepping)  X5 min   Step up Solo  2 6'' boxes  1 4'' box   Chalk line in center of box  X4 laps fwd       hurdles        Dual task        Ther Ex                                                                        Ther Activity                        Gait Training        Treadmill                 Modalities

## 2025-06-25 ENCOUNTER — OFFICE VISIT (OUTPATIENT)
Dept: PHYSICAL THERAPY | Facility: CLINIC | Age: 75
End: 2025-06-25
Payer: MEDICARE

## 2025-06-25 ENCOUNTER — OFFICE VISIT (OUTPATIENT)
Dept: OCCUPATIONAL THERAPY | Facility: CLINIC | Age: 75
End: 2025-06-25
Payer: MEDICARE

## 2025-06-25 DIAGNOSIS — I63.9 CEREBROVASCULAR ACCIDENT (CVA), UNSPECIFIED MECHANISM (HCC): Primary | ICD-10-CM

## 2025-06-25 DIAGNOSIS — I63.9 OCCIPITOTEMPORAL INFARCT, ACUTE (HCC): Primary | ICD-10-CM

## 2025-06-25 PROCEDURE — 97112 NEUROMUSCULAR REEDUCATION: CPT

## 2025-06-25 NOTE — PROGRESS NOTES
"Occupational Therapy Daily Note:    Today's date: 2025  Patient name: Victor Manuel Mcmanus  : 1950  MRN: [id number]  Referring provider: No ref. provider found  Dx:   Encounter Diagnosis   Name Primary?    Cerebrovascular accident (CVA), unspecified mechanism (HCC) Yes          POC expires Unit limit Auth Expiration date PT/OT/ST + Visit Limit?   6/3/25 N/A 25 BOMN                           Visit/Unit Tracking  AUTH Status:  Date 4/8 4/16 4/22 4/23 4/29 4/30 5/6 5/8 5/13 PN    BOMN Used 1 2 3 4 5 6 7 8 9 1 2    Remaining  7 6 5 4 3 2 1 0 0 7 6      Visit/Unit Tracking  AUTH Status:  Date  RE    BOMN Used 3 4 5 6 1 2 3 4 5 6 7    Remaining  5 4 3 2 7 6 5 4 3 2 1        Duration in weeks: 8 weeks  Plan of care beginning date: 25  Plan of care expiration date: 25  PN: 7/3/25      Precautions: L side inattention,        Subjective: \"Why can't I see it?\"    Objective: See treatment below.  Pt participated in skilled OT session focusing on vision re-training, visual endurance, /VM skills, attention to left side, working memory and task sequencing for increased and safety during ADL/IADL tasks.    Neuro Re-ed: Pt participated in anagram worksheet focusing on VS skills, scanning environment, tracking w/additional cog demands of mental processing and attention.   Pt provided with worksheet positioned midline and letter tiles positioned to R<>L of pt increasing visual field and facilitating scanning demands. Pt required to read sentence and re-arrange letters in underlined word to make a new word and complete the sentence. Pt then instructed to retreive and place letters in sequence of new word.  Pt required visual aid of highlighting numbers at beginning of sentence to increase scanning abilities to the left to start reading at beginning of sentence.     Assessment: Tolerated treatment well. Pt benefited from visual aid of highlighted numbers " and occluder to decrease clutter of other sentences and increase attention to task.  Pt required assist >75% of time for task sequencing and scanning into left peripheral field. Patient would benefit from continued skilled OT.    Plan: Continued skilled OT per POC

## 2025-06-26 ENCOUNTER — OFFICE VISIT (OUTPATIENT)
Dept: OCCUPATIONAL THERAPY | Facility: CLINIC | Age: 75
End: 2025-06-26
Payer: MEDICARE

## 2025-06-26 DIAGNOSIS — I63.9 CEREBROVASCULAR ACCIDENT (CVA), UNSPECIFIED MECHANISM (HCC): Primary | ICD-10-CM

## 2025-06-26 PROCEDURE — 97112 NEUROMUSCULAR REEDUCATION: CPT

## 2025-06-26 NOTE — PROGRESS NOTES
"Occupational Therapy Daily Note:    Today's date: 2025  Patient name: Victor Manuel Mcmanus  : 1950  MRN: [id number]  Referring provider: Luis E Mcdaniels MD  Dx:   Encounter Diagnosis   Name Primary?    Cerebrovascular accident (CVA), unspecified mechanism (HCC) Yes       Start Time: 0800  Stop Time: 0845  Total time in clinic (min): 45 minutes    POC expires Unit limit Auth Expiration date PT/OT/ST + Visit Limit?   6/3/25 N/A 25 BOMN                           Visit/Unit Tracking  AUTH Status:  Date  PN    BOMN Used 1 2 3 4 5 6 7 8 9 1 2    Remaining  7 6 5 4 3 2 1 0 0 7 6      Visit/Unit Tracking  AUTH Status:  Date  6/ RE    BOMN Used 3 4 5 6 1 2 3 4 5 6 7    Remaining  5 4 3 2 7 6 5 4 3 2 1      Visit/Unit Tracking  AUTH Status:  Date              BOMN Used 8              Remaining  0                 Duration in weeks: 8 weeks  Plan of care beginning date: 25  Plan of care expiration date: 25  PN: 7/3/25      Precautions: L side inattention,    Subjective: \"I don't know why I'm doing this.\" (Sequencing directions)    Objective: See treatment below.  Pt participated in skilled OT session focusing on vision re-training, visual endurance, /VM skills, attention to left side, working memory and multi step direction follow w/task sequencing for increased and safety during ADL/IADL tasks.    Neuro Re-ed: Pt participated in coding task w/3 step verbal direction follow to color in grid following number/letter combo located on color grid placed to left of pt, graph paper positioned midline of pt focusing on tracking/scanning and attending to left side.   Improvement in task sequencing with time at task and persistent repetition.     Assessment: Tolerated treatment well. Pt utilized occluder and visual aid to hold place when identifying color block locating letter/number combination. Recommended to pts " spouse she give pt multi step direction for fxl tasks to complete at home for carryover of task this session.  Patient would benefit from continued skilled OT.    Plan: Continued skilled OT per POC

## 2025-06-30 ENCOUNTER — EVALUATION (OUTPATIENT)
Dept: OCCUPATIONAL THERAPY | Facility: CLINIC | Age: 75
End: 2025-06-30
Payer: MEDICARE

## 2025-06-30 ENCOUNTER — OFFICE VISIT (OUTPATIENT)
Dept: PHYSICAL THERAPY | Facility: CLINIC | Age: 75
End: 2025-06-30
Payer: MEDICARE

## 2025-06-30 DIAGNOSIS — I63.9 OCCIPITOTEMPORAL INFARCT, ACUTE (HCC): Primary | ICD-10-CM

## 2025-06-30 DIAGNOSIS — I63.9 CEREBROVASCULAR ACCIDENT (CVA), UNSPECIFIED MECHANISM (HCC): Primary | ICD-10-CM

## 2025-06-30 PROCEDURE — 97530 THERAPEUTIC ACTIVITIES: CPT

## 2025-06-30 PROCEDURE — 97112 NEUROMUSCULAR REEDUCATION: CPT

## 2025-06-30 NOTE — PROGRESS NOTES
OCCUPATIONAL THERAPY PROGRESS NOTE      6/30/2025  Victor Manuel Mcmanus  1950  [id number]  Luis E Mcdaniels MD   Diagnosis ICD-10-CM Associated Orders   1. Cerebrovascular accident (CVA), unspecified mechanism (HCC)  I63.9               Assessment/Plan    SKILLED ANALYSIS:  Pt is a 75 y.o. male referred to Occupational Therapy s/p Cerebrovascular accident (CVA), unspecified mechanism (HCC) [I63.9].  Pt participated in skilled OT PN today.  Pt reports feeling that his depth perception is off and has a hard time judging distance between items.  He reports moving slowly when going to reach for items. For example, if he is out to eat, he scans for his glass cup and slowly reaches for it so he doesn't knock it over.  His wife also reports biggest challenge is depth perception as well.  He reports completing word searches frequently at home.  Following testing today, pt is demo continued challenges with L > R scanning pattern, slowed scanning to L side, L sided visual field cut resulting in neglect to left visual fields, inability to see visual target until at midline during two person confrontation test, and continued decreased insight/carryover of strategies into deficits.   He will often compensate moving things into his right visual field in order to see them. Education to keep things at midline in order to challenge visual fields when scanning to see to L side.  Pt completed [place] Cancellation Test, identified 26/35 bells total, demonstrating a non-traditional scanning pattern by scanning vertically and then scattered.  He did demo slight improvements in this compared to last PN by identifying 12 additional bells.  Therapist continuing to educate pt on scanning environment prior to ambulating in new areas/places to improve awareness to peripheral fields. Continued to provide worksheets for take home and scanning skills from left to right.  Pt will benefit from skilled Occupational Therapy services 2-3x/week for 4 more  "weeks with focus on there act, there ex, and neuro re-ed.  Pt in agreement with POC.     Today's Treatment -  scanning worksheet of crossing off double number (88), using a L > R scanning pattern, and using a visual occluder to improve awareness.  Provided anchor on left side of page with a bright yellow line 2/2 pt starting to use a vertical scanning pattern despite initial cues of identifying \"far left\" and \"far right\" of paper to improve tracking. Pt benefit from visual occluder and required MOD-MAX cues for identifying yellow visual target in L visual fields for placement.      POC expires Unit limit Auth Expiration date PT/OT/ST + Visit Limit?   6/3/25 N/A 12/31/25 BOMN   7/30/25 12/31/25 BOMN                     Visit/Unit Tracking  AUTH Status:  Date 4/8 4/16 4/22 4/23 4/29 4/30 5/6 5/8 5/13 PN 5/14 5/20   BOMN Used 1 2 3 4 5 6 7 8 9 1 2    Remaining  7 6 5 4 3 2 1 0 0 7 6      Visit/Unit Tracking  AUTH Status:  Date 5/21 5/27 5/29 6/4 RE 6/6 6/9 6/11 6/16 6/18 6/23 6/25   BOMN Used 3 4 5 6 1 2 3 4 5 6 7    Remaining  5 4 3 2 7 6 5 4 3 2 1      Visit/Unit Tracking  AUTH Status:  Date 6/26 6/30            BOMN Used 8 1             Remaining  0 7                Duration in weeks: 2-3x/week, 8 weeks  Plan of care beginning date: 6/4/25  Plan of care expiration date: 7/30/25  Re-Eval:  7/30/2025    GOALS    Short Term Goals (4 weeks):  Cognition  - Pt will demo ability to participate in dual tasking/divided attention task with 50% accuracy in multimodal environment to simulate return to life roles PROGRESSING  - Pt will increase auditory processing speed for note taking/direction following requiring repetition of directions <50% of the time for improved role performance and engagement in salient tasks PROGRESSING    Vision  - Pt will demo with G carryover of compensatory and visual search strategies for visual tracking/gazing in LEFT peripheral visual fields to improve functional performance in salient tasks and " "safety PROGRESSING  - Pt will improve visual fixation to > 10 seconds on one target to L visual fields demo improved focus, attention, and visual memory  PROGRESSING  - Pt will demo with improved abilities to identify visual cues in L  peripheral visual field x 50% for increased safety in multimodal environments PROGRESSING  - Pt will demo with improved figure ground visual perceptual skills x 25% for improved visual attention when driving, identifying items in busy environments in home environment, or to improved abilities locating items in grocery stores PROGRESSING  - Pt will be able to cancel out 22/35 bells on bells cancelation test demo improved scanning to the L side past midline for increased ability to participate in daily routine. NOT MET   - Pt will be able to complete puzzle activity with minimal to no verbal cues demo improved scanning to L side to complete ADLs and IADLs. PROGRESSING    Long Term Goals (8 weeks):   - Pt will be independent with vision and motor HEP focusing on visual tracking, saccades, and pursuits to improve safety for functional mobility in new environments PROGRESSING  - Pt will increase visual attention to task for 40+ minutes in multimodal environment to improve visual skills for ADLs and IADLs PROGRESSING   - Pt will Improve visual pursuits and tracking from right to left visual fields for improved daily performance and completion of salient tasks PROGRESSING  - Pt will be able to cancel out 30/35 bells on bells cancelation test demo improved scanning to the L side past midline for increased ability to participate in daily routine. PROGRESSING    Subjective    PATIENT GOAL: \"To be independent and walking better\"    Patient-Specific Functional Scale   Task is scored 0 (unable to perform activity) to 10 (ability to perform activity independently)    Activity Date: Date:   Vision/scanning     2.   Endurance     3.        4.            HISTORY OF PRESENT ILLNESS:     Pt is a 75 y.o. " male who was referred to Occupational Therapy s/p Cerebrovascular accident (CVA), unspecified mechanism (HCC) [I63.9]. Pt was in the ER and was in the hospital for about a month and then completed inpatient rehab at [facility]. Pt has discharged from hospital on 3/22 and reports that since being home he has been working to slowly get back to doing things at home as he used to. Pt is currently getting treated for a burn on his upper thigh that he sustained from when in the hospital. Pt reports that he did have some UE weakness but he has been completing a strengthening HEP with 5# wts at home daily. Pt reports some depth perception issues in regards to his vision but overall does not feel that his vision has changed much. He wears glasses's and reports that even prior to the stroke he would sometimes have double vision when he would have to close one eye. Pt does feel that things are a little harder when on the L side or specifically when trying to sit down to make sure the chair is there prior to sitting.   Pt lives with his wife and is able to complete ADLs independently. Pt will do minimal cooking and cleaning at home. Pt does not report many differences, however therapist noting some repetition of conversation throughout evaluation. Pt wife reports that she oversees things such as medication mgt and walking different places to ensure safety throughout the house. Pt and wife complete finace mgmt together but wife mainly completes. 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     PMH:   Past Medical History:   Diagnosis Date    Arthritis     Atrial fibrillation (HCC)     Coronary artery disease (CAD) excluded 9/12/2013    COVID-19 01/26/2022    COVID-19 vaccination refused 1/27/2022    COVID-19 virus infection 1/27/2022    Diabetes 1.5, 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HCC)     Essential hypertension 3/7/2016    Hepatitis C virus infection resolved after antiviral drug therapy 10/17/2019    Hepatitis C, acute     HTN  (hypertension)     Influenza     Neuropathy     Pneumonia 2022    Tick bite of left wrist 2021    Viral illness 2021       Past Surgical Hx:   Past Surgical History:   Procedure Laterality Date    CYSTOSTOMY W/ BLADDER DILATION      basket extraction of calculus    FEMUR FRACTURE SURGERY      LITHOTRIPSY      PATELLA FRACTURE SURGERY      STONE ANALYSIS (HISTORICAL)            Pain Levels  Restin    With Activity:  0        Objective      Assessments    Vision         Comments                                        VISION SCREEN         GLASSES (prescription)  Progressives           Symptoms Include blurred vision     Last Eye Exam 1 year ago           Visual Acuity (near) R eye  20/40   L eye 20/40    Binocularity (near) orthophoria WFL- remained   Binocularity (far) orthophoria WFL- remained   Convergence  NO Diplopia reported  Pt reports that sometimes when looking far will have to close one eye due to seeing double   Accommodation  Blurriness/loss of focus at 3 inches WFL   Radu String             Alignment  Y and suppression of near        Mobility             Pursuits smooth in all planes WFL- remained           Saccades                    Moving Saccades  smooth in all planes, slowed when scanning to the L side and upper/lower L quadrant        Slightly decreased fixation on target, dec finding of target on L visual fields      Slowed to find in L sided plane at upper and lower, able to find at midline of L side               Range of Motion WFL    Two person confrontation test Unable to see target until MIDLINE in all planes on L side Impaired  - remains                [place] Cancellation Test     Total # of bells circled:   Time taken to complete test:  4 min 5 seconds     Number of Omissions:  2 in column 1- declined   0 in column 2- improved  0 in column 3- improved   0 in column 4- improved   1 in column 5- remained  4 in column 6- improved by 1 bell  2 in column 7 -improved  by 3 bells    Tracking pattern:Scanning on R side top to bottom and right to left; scattered scanning pattern towards the L          Improvement by identifying 12 additional bells; required more time to complete today       PLANNED THERAPY INTERVENTIONS:  Internal and external memory aides  Multimatrix for saccades/ visual clutter/attention  Hypersensitivity strategies education  Multi-modal environment  Sustained/alternating/divided attention  Tracking tube  Oculomotor control:  saccades, con/divergence  Conv./div. Dynamic tasks  Work stations with timed transitions  Temporal Awareness: Organize the Hour activities  Memory and mental manipulation  Auditory processing with immediate recall  Memory retention with immediate and delayed recall  Edu on cog/vision apps  Dalia alford scanning sheets

## 2025-06-30 NOTE — PROGRESS NOTES
Daily Note     Today's date: 2025  Patient name: Victor Manuel Mcmanus  : 1950  MRN: [id number]  Referring provider: Luis E Mcdaniels MD  Dx:   Encounter Diagnosis     ICD-10-CM    1. Occipitotemporal infarct, acute (HCC)  I63.9           Start Time: 08  Stop Time: 931  Total time in clinic (min): 38 minutes    Subjective: pt reports he is doing well today no sig changes to note      Objective: See treatment diary below      Assessment: Tolerated treatment well. Patient would benefit from continued PT continued focus on environmental navigation with emphasis on left sided attention. Navigation to include environment scanning for obstacles and safe ambulatory passes around/over objects as well as multi directional stepping fwd bwd and on diagonals. Pt requires min cueing for left sided attention and direction following of task. Cog task of right vs left foot added to blaze pod for divided attention. Initially pt steps on several obstacles w pool noodles however demonstrates improvement within session.      Plan: Continue per plan of care.      Short Term Goal Expiration Date:(25)  Long Term Goal Expiration Date: (25)  POC Expiration Date: (25)        POC expires Unit limit Auth Expiration date PT/OT/ST + Visit Limit?   25 6 neuro 25 BOMN                           Visit/Unit Tracking  AUTH Status:  Date    None PN every 10 Used 7/10 810 6/10 7/10 810 1/10 2/10 3/10 4/10 5/10 6/10    Remaining                     Precautions hx CVA, afb, HTN DM2, left side neglect        Manuals                                    Neuro Re-Ed         Multi directional stepping     SOLO  4 cones 5 hurdles weaving in and out ov cones over 6'' hurdles   X4 laps fwd   Uneven surface        TM SOLO  1.5 mph  6% grade 6.5 min  Dec 0% grade   X10 min total  100 bpm 96% spo2 at eng HIGT SOLO  1.5 mph  6% grade 6.5 min  Final 5 min searing  for items in room of certain color  X10 min total  HIGT SOLO  1.5 mph  X10 min w playing card matching board on left  SOLO  1.5 mph 6% grade 6 min  4 min 0% grade  Bean bags on TM x5 min  Color call out of maritza bags x2 min   Environment navigation/scanning Solo   2 6'' steps  2 12'' hurdles w dispersed blaze pods  Navigate through obstacles to light  2 min x1 rounds    Dec natalie height to 8'' x2 more rounds    Solo  4 foam beams side step to called out color cone  X3 min progressed to 2 color call out Location of correct color block bend and stand to tap blaze pod w correct color block  2 min x1 round  5 reps x 2 rounds    Flat dots and small agility cones dispersed  Pt to step only on dots across the environment  X3 laps  Add in pt can only step on 3 colors  X5 laps Solo   2 6'' steps  3 8'' hurdles w dispersed blaze pods  Navigate through obstacles  X3 laps    3 blaze pods added  8 taps     6 cone weaving stepping over 6'' hurdles in between cones  X3 laps    Hex ring w blaze pod step to correct color  2 min x2 Solo  Fwd bwd walking to dot matching light color bounce basketball  1 min 30s x3    Flat dots and small dispersed  Pt to step only on 3 colors of dots across the environment  X5 laps    Dispersed pool noodles tap red light with right foot blue light w L  10 taps x3 rounds    Standing on airex balloon tennis  X5 min SOLO  6 pods dispersed w basketball bounce  2 min x2 rounds    Soccer ball kick (incorporating reactive balance/stepping)  X5 min   Step up Solo  2 6'' boxes  1 4'' box   Chalk line in center of box  X4 laps fwd       hurdles        Dual task        Ther Ex                                                                        Ther Activity                        Gait Training        Treadmill                 Modalities

## 2025-07-02 ENCOUNTER — EVALUATION (OUTPATIENT)
Dept: PHYSICAL THERAPY | Facility: CLINIC | Age: 75
End: 2025-07-02
Payer: MEDICARE

## 2025-07-02 ENCOUNTER — OFFICE VISIT (OUTPATIENT)
Dept: OCCUPATIONAL THERAPY | Facility: CLINIC | Age: 75
End: 2025-07-02
Payer: MEDICARE

## 2025-07-02 DIAGNOSIS — I63.9 OCCIPITOTEMPORAL INFARCT, ACUTE (HCC): Primary | ICD-10-CM

## 2025-07-02 DIAGNOSIS — I63.9 CEREBROVASCULAR ACCIDENT (CVA), UNSPECIFIED MECHANISM (HCC): Primary | ICD-10-CM

## 2025-07-02 PROCEDURE — 97112 NEUROMUSCULAR REEDUCATION: CPT

## 2025-07-02 NOTE — PROGRESS NOTES
Daily Note     Today's date: 2025  Patient name: Victor Manuel Mcmanus  : 1950  MRN: [id number]  Referring provider: Luis E cMdaniels MD  Dx:   Encounter Diagnosis     ICD-10-CM    1. Occipitotemporal infarct, acute (HCC)  I63.9           Start Time: 0845  Stop Time: 0930  Total time in clinic (min): 45 minutes    Subjective: pts wife reports he continues to struggle most with his depth perception when navigating the home and community environment. She requests that we continue to work on this in future sessions      Objective: See treatment diary below      Assessment: Tolerated treatment well. Patient would benefit from continued PT  Pt has maintained their 6mwt distance indicating sustained aerobic capacity and activity tolerance however pts turns at the end of each lap have improved in efficiency and speed with no LOB. Pts gait speed has improved from 0.89 m/s to 9.95 m/s indicating pt is a community Ambulator however is still at risk for falls based on their speed being < 1 m/s.  Additionally pts 4 square step test time has declined from 14 to 18 indicating pt is at inc risk for falls as times >10.4s are considered pts identified as fallers and scores > 15 s indicate pt is as risk for multiple falls. While pts score declined he was struggling to follow the cognitive task of the test rather than struggling with his balance. Pt was required to complete test 8 times before correctly performing test with significant cueing leading to an increase in time to perform test.    Pt would continue to benefit from skilled physical therapy to improve overall QOL inclusive of, strength, coordination, balance, endurance and functional mobility in the home and in the community as well as reduce their fall risk for improved safety.         Assessment  Impairments: abnormal coordination, abnormal gait, abnormal muscle tone, activity intolerance, impaired balance, impaired physical strength, lacks appropriate home exercise  program, participation limitations, activity limitations and endurance     Assessment details: Pt is a 75 year old male presenting to [facility] physical therapy for an initial evaluation secondary to CVA. Pt presents to clinic with gait and balance impairments inclusive of left side neglect and visual impairment impacting pts ability to safely navigate community and environment, MH of shoe lift on RLE and R knee fx and utilizes SPC assistive device for mobility (in the home/in the community) .       Additionally pt demonstrates strength deficits as seen with MMT scores of <5/5 and 5x STS time of 22.28 demonstrating dec LE strength and power as compared to age matched norms.  Pt is at inc risk for falls based on their FGA score of 20/30  with scores < 20/30 at inc risk for spontaneous falls and scores < 23/30 considered at risk for falls. pts TUG time of 16.0 s w SPC (verbal cues for direction of ambulation and inc time to locate cones) puts them at inc risk for falls as scores > 13.5 are considered at risk for falls.Pts 3m bwd walking time of 9,53 s  indicates pt is at inc risk for falls as scores > 4.5 s are at risk for falls.  6mwt and 10mwt to be complete NV for endurance and gait speed testing. Pt was provided edu on left sided awareness and spatial awareness when navigating environments after stroke.     Pt would benefit from skilled physical therapy to improve  overall QOL inclusive of, strength, coordination, balance, endurance and functional mobility in the home and in the community as well as reduce their fall risk for improved safety.         Goals    New goals  STG (4 weeks)    Increase 6MWT of 850 by 82 ft indicating meaningful change in aerobic endurance not met  Patient will improve gait speed to 0.95 to improve (household/community distance ambulation) and reduce pts fall risk met  Pt will improve 4 square step test time of 14 by 4.6s (MDC) to demonstrate an improvement in balance and a reduction in pts  fall risk not met      LTG (8 weeks)     Patient will improve gait speed to >1 to improve functional community ambulation and reduce pts fall risk  Pt will improve 3m bwd walk time of 5.44  to <4.5 to demonstrate an improvement in balance and a reduction in fall risk  Improve safety awareness with dec in left sided errors when walking outside (running into left sided obstacles or walking too close to edge of side walk impacting safety)               Plan  Patient would benefit from: skilled physical therapy     Planned therapy interventions: balance, gait training, graded activity, graded exercise, home exercise program, neuromuscular re-education, patient/caregiver education, strengthening, stretching, therapeutic activities and therapeutic exercise     Frequency: 2x week  Duration in weeks: 8  Plan of Care beginning date: 7/2/2025  Plan of Care expiration date: 7/30/2025  Treatment plan discussed with: patient        Subjective Evaluation     History of Present Illness  Mechanism of injury: Per epic chart review:  Repeat CT of the head on 2/24 showed redemonstration of evolving infarct in the right occipital and posterior temporal lobes, now extending to the right parietal lobe. There are small areas of hemorrhagic transformation in these these regions of infarction. There is substantial cytotoxic edema in these regions with sulcal effacement and on the right as well as and effacement of the portions of the right lateral ventricle. Additional small acute infarct in the inferior aspect of right cerebellar hemisphere, small infarct in the right thalamus. [facility] was consulted for GOC and patient remained treatment focused.     Per pt report:  He is doing okay in terms of his walking and balance but has to take his time. Pt pt is currently using an SPC self reported about 60% of the time ut was not using it pretty much at all before. Pt reports he has been able to use the stairs with a railing. Pt has been walking on  various surfaces (grass side walk gravel) with PT when he was in the hospital and has been doing well with that. Wife reports left sided inattention  Patient Goals  Patient goals for therapy: improved balance  Patient goal: improved independence  Pain  No pain reported           Objective        Balance Test IE 5/6 6/4 7/2     6 Minute Walk Test (ft): [phone]     2 Minute Walk Test (ft):         Gait Speed (m/s): 0.62 0.94 0.89 0.95     5x Sit To Stand (s): 22.28 ue  13.72 no UE      TUG (s) 16 spc 11.54 no AD 10.03      FGA  3 m bwd walk 20  9.53 24  5.22 25  5.44      Mini BEST         4 square step test  12.85s 1 step on canes 14.8 no stepping on canes 18.46 no stepping on canes          Short Term Goal Expiration Date:(6/2/25)  Long Term Goal Expiration Date: (7/30/25)  POC Expiration Date: (7/30/25)        POC expires Unit limit Auth Expiration date PT/OT/ST + Visit Limit?   7/30/25 6 neuro 12/31/25 BOMN                           Visit/Unit Tracking  AUTH Status:  Date 6/25 6/30 7/2 5/29 6/4 6/6 6/9 6/11 6/16 6/18 6/23   None PN every 10 Used 7/10 8/10 9/10 7/10 8/10 1/10 2/10 3/10 4/10 5/10 6/10    Remaining                     Precautions hx CVA, afb, HTN DM2, left side neglect        Manuals 6/18 6/23 6/25 6/30 7/3                                   Neuro Re-Ed      4 square step test  10mwt  6mwt HIGT   Multi directional stepping        Uneven surface        TM SOLO  1.5 mph  6% grade 6.5 min  Dec 0% grade   X10 min total  100 bpm 96% spo2 at eng HIGT SOLO  1.5 mph  6% grade 6.5 min  Final 5 min searing for items in room of certain color  X10 min total  HIGT SOLO  1.5 mph  X10 min w playing card matching board on left  Solo  1.5 mph  X10 min  Bean bags on tm for depth perception and nvigation   Environment navigation/scanning Solo   2 6'' steps  2 12'' hurdles w dispersed blaze pods  Navigate through obstacles to light  2 min x1 rounds    Dec natalie height to 8'' x2 more rounds    Solo  4 foam beams  side step to called out color cone  X3 min progressed to 2 color call out Location of correct color block bend and stand to tap blaze pod w correct color block  2 min x1 round  5 reps x 2 rounds    Flat dots and small agility cones dispersed  Pt to step only on dots across the environment  X3 laps  Add in pt can only step on 3 colors  X5 laps Solo   2 6'' steps  3 8'' hurdles w dispersed blaze pods  Navigate through obstacles  X3 laps    3 blaze pods added  8 taps     6 cone weaving stepping over 6'' hurdles in between cones  X3 laps    Hex ring w blaze pod step to correct color  2 min x2 Solo  Fwd bwd walking to dot matching light color bounce basketball  1 min 30s x3    Flat dots and small dispersed  Pt to step only on 3 colors of dots across the environment  X5 laps    Dispersed pool noodles tap red light with right foot blue light w L  10 taps x3 rounds    Standing on airex balloon tennis  X5 min    Step up Solo  2 6'' boxes  1 4'' box   Chalk line in center of box  X4 laps fwd       hurdles        Dual task        Ther Ex                                                                        Ther Activity                        Gait Training        Treadmill                 Modalities

## 2025-07-02 NOTE — PROGRESS NOTES
"Occupational Therapy Daily Note:    Today's date: 2025  Patient name: Victor Manuel Mcmanus  : 1950  MRN: [id number]  Referring provider: Luis E Mcdaniels MD  Dx:   Encounter Diagnosis   Name Primary?    Cerebrovascular accident (CVA), unspecified mechanism (HCC) Yes       Start Time: 0800  Stop Time: 0845  Total time in clinic (min): 45 minutes    Subjective: \"I don't know why I don't get this.\"    Objective: See treatment below.  Pt participated in skilled OT session focusing on vision re-training, visual endurance, /VM skills, attention to left side, working memory and multi step direction follow w/task sequencing for increased and safety during ADL/IADL tasks.    Neuro Re-ed: Pt engaged in cryptogram coding worksheet focusing on attending to left, scanning, tracking in horizontal plane w/additional cog demands of simple 3 step verbal direction follow.  Pt provided with verbal directions to match picture in word phrase with picture/letter grid transferring letters to word phrase to reveal secret message.  Pt require repetitive re-enforcement of directions and visual aides through progression of task.    Assessment: Tolerated treatment well. Pt required cues to look to left to identify and match pictures as well as transferring letter to appropriate box below picture.  Pt utilized finger as visual aid to hold place on worksheet.   Patient would benefit from continued skilled OT.    Plan: Continued skilled OT per POC    "

## 2025-07-07 ENCOUNTER — OFFICE VISIT (OUTPATIENT)
Dept: OCCUPATIONAL THERAPY | Facility: CLINIC | Age: 75
End: 2025-07-07
Payer: MEDICARE

## 2025-07-07 ENCOUNTER — OFFICE VISIT (OUTPATIENT)
Dept: PHYSICAL THERAPY | Facility: CLINIC | Age: 75
End: 2025-07-07
Payer: MEDICARE

## 2025-07-07 DIAGNOSIS — I63.9 CEREBROVASCULAR ACCIDENT (CVA), UNSPECIFIED MECHANISM (HCC): Primary | ICD-10-CM

## 2025-07-07 DIAGNOSIS — I63.9 OCCIPITOTEMPORAL INFARCT, ACUTE (HCC): Primary | ICD-10-CM

## 2025-07-07 PROCEDURE — 97112 NEUROMUSCULAR REEDUCATION: CPT

## 2025-07-07 NOTE — PROGRESS NOTES
Daily Note     Today's date: 2025  Patient name: Victor Manuel Mcmanus  : 1950  MRN: [id number]  Referring provider: Luis E Mcdaniels MD  Dx:   Encounter Diagnosis     ICD-10-CM    1. Occipitotemporal infarct, acute (HCC)  I63.9           Start Time: 0845  Stop Time: 0930  Total time in clinic (min): 45 minutes    Subjective: pt reports he was late today bc he could not find his glasses on the table, otherwise he is doing well.      Objective: See treatment diary below      Assessment: Tolerated treatment well. Patient would benefit from continued PT PT had pt call out color of bean bag to assess how many he was seeing vs stepping over by chance. Pt able to identify approx 65% of bean bags on treat all missed were left side of tread. Blaze pod location performed w hurdles in the space for not only identification of light but also watching his step with both feet and SPC placement for added dual task. Errors approx 20% of the time w hurdles walking towards pods.       Plan: Continue per plan of care.      Short Term Goal Expiration Date:(25)  Long Term Goal Expiration Date: (25)  POC Expiration Date: (25)        POC expires Unit limit Auth Expiration date PT/OT/ST + Visit Limit?   25 6 neuro 25 BOMN                           Visit/Unit Tracking  AUTH Status:  Date  7/3 7/7 6/6 6   None PN every 10 Used 7/10 8/10 9/10 10/10 1/10 1/10 2/10 3/10 4/10 5/10 6/10    Remaining                     Precautions hx CVA, afb, HTN DM2, left side neglect        Manuals 7/7 6/23 6/25 6/30 7/3                                   Neuro Re-Ed      4 square step test  10mwt  6mwt HIGT   Multi directional stepping        Uneven surface        TM SOLO  1.5 mph  6% grade  W bean bags on tread color call out to assess what pt sees  X10 min SOLO  1.5 mph  6% grade 6.5 min  Final 5 min searing for items in room of certain color  X10 min total  HIGT SOLO  1.5 mph  X10 min w playing  card matching board on left SOLO  1.5 mph  6% grade  W bean bags on tread color call out to assess what pt sees  X10 min Solo  1.5 mph  X10 min  Bean bags on tm for depth perception and nvigation   Environment navigation/scanning 3 blaze pods on mirror one on table to pts left navigate to pod over 3 6'' hurdles  2 min x3 rounds    Cone weaving to blaze pod that's on multi directional stepping navigation  2 min x2    Beach ball toss  X5 min emphasis on left scanning for ball Location of correct color block bend and stand to tap blaze pod w correct color block  2 min x1 round  5 reps x 2 rounds    Flat dots and small agility cones dispersed  Pt to step only on dots across the environment  X3 laps  Add in pt can only step on 3 colors  X5 laps Solo   2 6'' steps  3 8'' hurdles w dispersed blaze pods  Navigate through obstacles  X3 laps    3 blaze pods added  8 taps     6 cone weaving stepping over 6'' hurdles in between cones  X3 laps    Hex ring w blaze pod step to correct color  2 min x2 Solo  Fwd bwd walking to dot matching light color bounce basketball  1 min 30s x3    Flat dots and small dispersed  Pt to step only on 3 colors of dots across the environment  X5 laps    Dispersed pool noodles tap red light with right foot blue light w L  10 taps x3 rounds    Standing on airex balloon tennis  X5 min    Step up        hurdles        Dual task        Ther Ex                                                                        Ther Activity                        Gait Training        Treadmill                 Modalities

## 2025-07-07 NOTE — PROGRESS NOTES
"Occupational Therapy Daily Note:    Today's date: 2025  Patient name: Victor Manuel Mcmanus  : 1950  MRN: [id number]  Referring provider: Luis E Mcdaniels MD  Dx:   Encounter Diagnosis   Name Primary?    Cerebrovascular accident (CVA), unspecified mechanism (HCC) Yes                  POC expires Unit limit Auth Expiration date PT/OT/ST + Visit Limit?   6/3/25 N/A 25 BOMN   25 BOMN                     Visit/Unit Tracking  AUTH Status:  Date  PN    BOMN Used 1 2 3 4 5 6 7 8 9 1 2    Remaining  7 6 5 4 3 2 1 0 0 7 6      Visit/Unit Tracking  AUTH Status:  Date  6/ RE    BOMN Used 3 4 5 6 1 2 3 4 5 6 7    Remaining  5 4 3 2 7 6 5 4 3 2 1      Visit/Unit Tracking  AUTH Status:  Date  7          BOMN Used 8 1 2 3           Remaining  0 7 6 5              Duration in weeks: 2-3x/week, 8 weeks  Plan of care beginning date: 25  Plan of care expiration date: 25  Re-Eval:  2025    *Pt 15 min late to session this date*     Subjective: \"Ya know I had a problem this morning I couldn't find my glasses\"    Objective: See treatment below.  Pt participated in skilled OT session focusing on vision re-training, visual endurance, /VM skills, attention to left side, working memory and multi step direction follow w/task sequencing for increased and safety during ADL/IADL tasks.    Neuro Re-ed: Pt seated at table working on visual scanning task with casimiro board positioned on table. Pt instructed to scan to the L, grasp casimiro from container, and then place on matching spot on board. Pt required 3 verbal cues at start of task to scan to the L to grasp pieces, and 1 verbal cue for 1 mistake noted then able to correct. Therapist adding black dots to the yellow color due to difficulty to see on board. Ended session with Pt placing three chips to board following number and completing addition of " dots on dominos before placing to correct spot.     Assessment: Tolerated treatment well. Pt with no new reports this date. [facility], wife to bring in report  after session.  Pt provided 2 large print cryptogram worksheets to complete for homework and bring back next session. Patient would benefit from continued skilled OT.    Plan: Continued skilled OT per POC

## 2025-07-09 ENCOUNTER — OFFICE VISIT (OUTPATIENT)
Dept: PHYSICAL THERAPY | Facility: CLINIC | Age: 75
End: 2025-07-09
Payer: MEDICARE

## 2025-07-09 ENCOUNTER — OFFICE VISIT (OUTPATIENT)
Dept: OCCUPATIONAL THERAPY | Facility: CLINIC | Age: 75
End: 2025-07-09
Payer: MEDICARE

## 2025-07-09 DIAGNOSIS — I63.9 CEREBROVASCULAR ACCIDENT (CVA), UNSPECIFIED MECHANISM (HCC): Primary | ICD-10-CM

## 2025-07-09 DIAGNOSIS — I63.9 OCCIPITOTEMPORAL INFARCT, ACUTE (HCC): Primary | ICD-10-CM

## 2025-07-09 PROCEDURE — 97112 NEUROMUSCULAR REEDUCATION: CPT

## 2025-07-09 PROCEDURE — 97530 THERAPEUTIC ACTIVITIES: CPT

## 2025-07-09 NOTE — PROGRESS NOTES
Daily Note     Today's date: 2025  Patient name: Victor Manuel Mcmanus  : 1950  MRN: [id number]  Referring provider: Luis E Mcdaniels MD  Dx:   Encounter Diagnosis     ICD-10-CM    1. Occipitotemporal infarct, acute (HCC)  I63.9           Start Time: 0845  Stop Time: 0930  Total time in clinic (min): 45 minutes    Subjective: pt reports he is doing well today ready for PT      Objective: See treatment diary below      Assessment: Tolerated treatment well. Patient would benefit from continued PT session staed on TM for HIGT for improved NPP factors to benefit learning throughout session w inc BDNF present. Pt reports RPE 7/10. On initial 3 laps of small cone navigation pt demonstrates 2-3 errors per lap pt does improve with practice with 1-2 errors noted following. All errors hitting LLE on cone increasing pts fall risk if he is unable to identify obstacles on the left. PT sets goal for pt of no more than 1 error per lap which does improves pts awareness of the L side      Plan: Continue per plan of care.      Short Term Goal Expiration Date:(25)  Long Term Goal Expiration Date: (25)  POC Expiration Date: (25)        POC expires Unit limit Auth Expiration date PT/OT/ST + Visit Limit?   25 6 neuro 25 BOMN                           Visit/Unit Tracking  AUTH Status:  Date  7/3 7/7 7/9 6/9 6/11 6/16 6/18 6/23   None PN every 10 Used 7/10 8/10 910 10/10 1/10 210 210 310 10 510 6/10    Remaining                     Precautions hx CVA, afb, HTN DM2, left side neglect        Manuals 7/7 7/9 6/25 6/30 7/3                                   Neuro Re-Ed      4 square step test  10mwt  6mwt HIGT   Multi directional stepping        Uneven surface        TM SOLO  1.5 mph  6% grade  W bean bags on tread color call out to assess what pt sees  X10 min SOLO  1.5 mph  6% grade  X10 min SOLO  1.5 mph  X10 min w playing card matching board on left SOLO  1.5 mph  6% grade  W bean bags on tread  color call out to assess what pt sees  X10 min Solo  1.5 mph  X10 min  Bean bags on tm for depth perception and nvigation   Environment navigation/scanning 3 blaze pods on mirror one on table to pts left navigate to pod over 3 6'' hurdles  2 min x3 rounds    Cone weaving to blaze pod that's on multi directional stepping navigation  2 min x2    Beach ball toss  X5 min emphasis on left scanning for ball SOLO  Small cones (all) dispersed across 18 ft span pt navigates through w ball to place ball on large cone at the end  X16 laps    Walking HT spotting playing cards  120 ft  X4 laps Solo   2 6'' steps  3 8'' hurdles w dispersed blaze pods  Navigate through obstacles  X3 laps    3 blaze pods added  8 taps     6 cone weaving stepping over 6'' hurdles in between cones  X3 laps    Hex ring w blaze pod step to correct color  2 min x2 Solo  Fwd bwd walking to dot matching light color bounce basketball  1 min 30s x3    Flat dots and small dispersed  Pt to step only on 3 colors of dots across the environment  X5 laps    Dispersed pool noodles tap red light with right foot blue light w L  10 taps x3 rounds    Standing on airex balloon tennis  X5 min    Step up        hurdles        Dual task        Ther Ex                                                                        Ther Activity                        Gait Training        Treadmill                 Modalities

## 2025-07-09 NOTE — PROGRESS NOTES
"Occupational Therapy Daily Note:    Today's date: 2025  Patient name: Victor Manuel Mcmanus  : 1950  MRN: [id number]  Referring provider: Luis E Mcdaniels MD  Dx:   Encounter Diagnosis   Name Primary?    Cerebrovascular accident (CVA), unspecified mechanism (HCC) Yes       Start Time: 0800  Stop Time: 0845  Total time in clinic (min): 45 minutes    POC expires Unit limit Auth Expiration date PT/OT/ST + Visit Limit?   6/3/25 N/A 25 BOMN   25 BOMN                     Visit/Unit Tracking  AUTH Status:  Date  PN    BOMN Used 1 2 3 4 5 6 7 8 9 1 2    Remaining  7 6 5 4 3 2 1 0 0 7 6      Visit/Unit Tracking  AUTH Status:  Date  6 RE    BOMN Used 3 4 5 6 1 2 3 4 5 6 7    Remaining  5 4 3 2 7 6 5 4 3 2 1      Visit/Unit Tracking  AUTH Status:  Date          BOMN Used 8 1 2 3 4          Remaining  0 7 6 5 4             Duration in weeks: 2-3x/week, 8 weeks  Plan of care beginning date: 25  Plan of care expiration date: 25  Re-Eval:  2025      Subjective: \"I have calibrated eyes, I can understand things when I see them.\"    Objective: See treatment below.  Pt participated in skilled OT session focusing on vision re-training, visual endurance, /VM skills, attention to left side, working memory and multi step direction follow w/task sequencing for increased and safety during ADL/IADL tasks.    Theract: Activity focusing on visual/mental processing, visual attention and working memory engaging in constructional task following template board to connect corresponding colored rods b/t blocks completing ITrax patterns. Pt required to trace out pattern b/t 2 blocks followed by retrieving and placing correct color and size rods to complete pattern.  Pattern positioned midline and rods and squares positioned to left of pt facilitating left side attention.     Assessment: " Tolerated treatment well. Pt completed 4 patterns increasing in complexity requiring 1:1 assist for direction follow with increased difficulty noted in sumaya discrimination when attending to left.  Pt unable to indep follow through with task. Patient would benefit from continued skilled OT.    Plan: Continued skilled OT per POC

## 2025-07-10 ENCOUNTER — OFFICE VISIT (OUTPATIENT)
Dept: OCCUPATIONAL THERAPY | Facility: CLINIC | Age: 75
End: 2025-07-10
Payer: MEDICARE

## 2025-07-10 DIAGNOSIS — I63.9 CEREBROVASCULAR ACCIDENT (CVA), UNSPECIFIED MECHANISM (HCC): Primary | ICD-10-CM

## 2025-07-10 PROCEDURE — 97112 NEUROMUSCULAR REEDUCATION: CPT

## 2025-07-10 NOTE — PROGRESS NOTES
"Occupational Therapy Daily Note:    Today's date: 7/10/2025  Patient name: Victor Manuel Mcmanus  : 1950  MRN: [id number]  Referring provider: Luis E Mcdaniels MD  Dx:   Encounter Diagnosis   Name Primary?    Cerebrovascular accident (CVA), unspecified mechanism (HCC) Yes          POC expires Unit limit Auth Expiration date PT/OT/ST + Visit Limit?   6/3/25 N/A 25 BOMN   25 BOMN                     Visit/Unit Tracking  AUTH Status:  Date  5 PN    BOMN Used 1 2 3 4 5 6 7 8 9 1 2    Remaining  7 6 5 4 3 2 1 0 0 7 6      Visit/Unit Tracking  AUTH Status:  Date  6/ RE    BOMN Used 3 4 5 6 1 2 3 4 5 6 7    Remaining  5 4 3 2 7 6 5 4 3 2 1      Visit/Unit Tracking  AUTH Status:  Date  7/2 7/7 7/9 7/10        BOMN Used 8 1 2 3 4 5         Remaining  0 7 6 5 4 3            Duration in weeks: 2-3x/week, 8 weeks  Plan of care beginning date: 25  Plan of care expiration date: 25  Re-Eval:  2025        Subjective: \"I think I would move this one next, right?\"    Objective: See treatment below.  Pt participated in skilled OT session focusing on vision re-training, visual endurance, /VM skills, attention to left side, working memory and multi step direction follow w/task sequencing for increased and safety during ADL/IADL tasks.    Neuro Re-ed: Pt participated in multi-matrix focusing on attention into L peripheral field, ocular tolerance, VS/ and spatial/directional orientation. Pt provided with verbal instructions to match shape cubes positioned in right/left peripheral field placing in same orientation as shape card, colored matrix cubes positioned in 6x4 square matching shape card      Assessment: Tolerated treatment well. Pt completed matrix activity w/>75% accuracy to identify shapes and place in correct orientation. Pt indep progressed from one step to the next completing task " w/additional time required. Patient would benefit from continued skilled OT.    Plan: Continued skilled OT per POC

## 2025-07-14 ENCOUNTER — APPOINTMENT (OUTPATIENT)
Dept: OCCUPATIONAL THERAPY | Facility: CLINIC | Age: 75
End: 2025-07-14
Payer: MEDICARE

## 2025-07-14 ENCOUNTER — OFFICE VISIT (OUTPATIENT)
Dept: PHYSICAL THERAPY | Facility: CLINIC | Age: 75
End: 2025-07-14
Payer: MEDICARE

## 2025-07-14 DIAGNOSIS — I63.9 OCCIPITOTEMPORAL INFARCT, ACUTE (HCC): Primary | ICD-10-CM

## 2025-07-14 PROCEDURE — 97112 NEUROMUSCULAR REEDUCATION: CPT

## 2025-07-14 NOTE — PROGRESS NOTES
"Daily Note     Today's date: 2025  Patient name: Victor Manuel Mcmanus  : 1950  MRN: [id number]  Referring provider: Luis E Mcdaniels MD  Dx:   Encounter Diagnosis     ICD-10-CM    1. Occipitotemporal infarct, acute (HCC)  I63.9           Start Time: 0845  Stop Time: 0930  Total time in clinic (min): 45 minutes    Subjective: pt reports he had a good weekend no sig changes to report      Objective: See treatment diary below      Assessment: Tolerated treatment well. Patient would benefit from continued PT pt cues to call out color of bean bag to assess which ones pt recognizes miss approx 10% of kwok bags on the L side were outside of pts view.  Continued environmental navigation with high quantity of obstacles as pt is able to navigate around simple to moderately complex environments with min errors, highly complex environments are when errors are noted. Pt tends to walk on the rihgt side of obstacle course partially walking outside fo obstacle  region. Pool noodles added as a \"tricia\" for pt stay between.      Plan: Continue per plan of care.      Short Term Goal Expiration Date:(25)  Long Term Goal Expiration Date: (25)  POC Expiration Date: (25)        POC expires Unit limit Auth Expiration date PT/OT/ST + Visit Limit?   25 6 neuro 25 BOMN                           Visit/Unit Tracking  AUTH Status:  Date  7/3 7/7 7/9 7/14 6/11 6/16 6/18 6/23   None PN every 10 Used 7/10 8/10 9/10 10/10 1/10 2/10 3/10 3/10 4/10 5/10 6/10    Remaining                     Precautions hx CVA, afb, HTN DM2, left side neglect        Manuals 7/7 7/9 7/14 6/30 7/3                                   Neuro Re-Ed      4 square step test  10mwt  6mwt HIGT   Multi directional stepping        Uneven surface        TM SOLO  1.5 mph  6% grade  W bean bags on tread color call out to assess what pt sees  X10 min SOLO  1.5 mph  6% grade  X10 min SOLO  1.5 mph  6% grade  W bean bags on tread color call out to " assess what pt sees  X10 min SOLO  1.5 mph  6% grade  W bean bags on tread color call out to assess what pt sees  X10 min Solo  1.5 mph  X10 min  Bean bags on tm for depth perception and nvigation   Environment navigation/scanning 3 blaze pods on mirror one on table to pts left navigate to pod over 3 6'' hurdles  2 min x3 rounds    Cone weaving to blaze pod that's on multi directional stepping navigation  2 min x2    Beach ball toss  X5 min emphasis on left scanning for ball SOLO  Small cones (all) dispersed across 18 ft span pt navigates through w ball to place ball on large cone at the end  X16 laps    Walking HT spotting playing cards  120 ft  X4 laps SOLO  Small cones (all) dispersed across 18 ft span pt navigates through w ball to place ball on large cone at the end  X16 laps Solo  Fwd bwd walking to dot matching light color bounce basketball  1 min 30s x3    Flat dots and small dispersed  Pt to step only on 3 colors of dots across the environment  X5 laps    Dispersed pool noodles tap red light with right foot blue light w L  10 taps x3 rounds    Standing on airex balloon tennis  X5 min    Step up        hurdles        Dual task        Ther Ex                                                                        Ther Activity                        Gait Training        Treadmill                 Modalities

## 2025-07-15 ENCOUNTER — OFFICE VISIT (OUTPATIENT)
Dept: OCCUPATIONAL THERAPY | Facility: CLINIC | Age: 75
End: 2025-07-15
Payer: MEDICARE

## 2025-07-15 DIAGNOSIS — I63.9 CEREBROVASCULAR ACCIDENT (CVA), UNSPECIFIED MECHANISM (HCC): Primary | ICD-10-CM

## 2025-07-15 PROCEDURE — 97112 NEUROMUSCULAR REEDUCATION: CPT

## 2025-07-16 ENCOUNTER — OFFICE VISIT (OUTPATIENT)
Dept: PHYSICAL THERAPY | Facility: CLINIC | Age: 75
End: 2025-07-16
Payer: MEDICARE

## 2025-07-16 ENCOUNTER — OFFICE VISIT (OUTPATIENT)
Dept: OCCUPATIONAL THERAPY | Facility: CLINIC | Age: 75
End: 2025-07-16
Payer: MEDICARE

## 2025-07-16 DIAGNOSIS — I63.9 OCCIPITOTEMPORAL INFARCT, ACUTE (HCC): Primary | ICD-10-CM

## 2025-07-16 DIAGNOSIS — I63.9 CEREBROVASCULAR ACCIDENT (CVA), UNSPECIFIED MECHANISM (HCC): Primary | ICD-10-CM

## 2025-07-16 PROCEDURE — 97530 THERAPEUTIC ACTIVITIES: CPT

## 2025-07-16 PROCEDURE — 97112 NEUROMUSCULAR REEDUCATION: CPT

## 2025-07-16 NOTE — PROGRESS NOTES
Daily Note     Today's date: 2025  Patient name: Victor Manuel Mcmanus  : 1950  MRN: [id number]  Referring provider: Luis E Mcdaniels MD  Dx:   Encounter Diagnosis     ICD-10-CM    1. Occipitotemporal infarct, acute (HCC)  I63.9           Start Time: 0845  Stop Time: 0930  Total time in clinic (min): 45 minutes    Subjective: pt is in a good mod today and reports he is doing well      Objective: See treatment diary below      Assessment: Tolerated treatment well. Patient would benefit from continued PT pt struggles to identify efficient path across colors with a busy environment. PT required to consistently redirect pt tasks and have him repeat his colors out loud. Pt does not have any balance concerns with this task only environmental scanning and navigation. By last 2 laps pt able to self cue himself to repeat his colors out loud to figure out where to go next without PT cueing. Improved ability to track balloon today w pass with only 2 instances of losing track of balloon on the L side.      Plan: Continue per plan of care.      Short Term Goal Expiration Date:(25)  Long Term Goal Expiration Date: (25)  POC Expiration Date: (25)        POC expires Unit limit Auth Expiration date PT/OT/ST + Visit Limit?   25 6 neuro 25 BOMN                           Visit/Unit Tracking  AUTH Status:  Date 6/25 6/30 7/2 7/3 7/7 7/9 7/14 7/16 6/16 6/18 6/23   None PN every 10 Used 7/10 8/10 9/10 10/10 1/10 2/10 3/10 4/10 4/10 5/10 6/10    Remaining                     Precautions hx CVA, afb, HTN DM2, left side neglect        Manuals 7/7 7/9 7/14 7/16 7/3                                   Neuro Re-Ed      4 square step test  10mwt  6mwt HIGT   Multi directional stepping        Uneven surface        TM SOLO  1.5 mph  6% grade  W bean bags on tread color call out to assess what pt sees  X10 min SOLO  1.5 mph  6% grade  X10 min SOLO  1.5 mph  6% grade  W bean bags on tread color call out to assess what pt  sees  X10 min HIGT SOLO  6% incline  1.5 mph  X10 min  RPE 7/10  HR 79 bpm spo2 97% Solo  1.5 mph  X10 min  Bean bags on tm for depth perception and nvigation   Environment navigation/scanning 3 blaze pods on mirror one on table to pts left navigate to pod over 3 6'' hurdles  2 min x3 rounds    Cone weaving to blaze pod that's on multi directional stepping navigation  2 min x2    Beach ball toss  X5 min emphasis on left scanning for ball SOLO  Small cones (all) dispersed across 18 ft span pt navigates through w ball to place ball on large cone at the end  X16 laps    Walking HT spotting playing cards  120 ft  X4 laps SOLO  Small cones (all) dispersed across 18 ft span pt navigates through w ball to place ball on large cone at the end  X16 laps SOLO  Dots and river rocks dispersed, pt given  3 colors he can step on to get across  X6 laps    SOLO standing on airex  W balloon racket volley  X5 min    Step up        hurdles        Dual task        Ther Ex                                                                        Ther Activity                        Gait Training        Treadmill                 Modalities

## 2025-07-16 NOTE — PROGRESS NOTES
"Occupational Therapy Daily Note:    Today's date: 2025  Patient name: Victor Manuel Mcmanus  : 1950  MRN: [id number]  Referring provider: Luis E Mcdaniels MD  Dx:   Encounter Diagnosis   Name Primary?    Cerebrovascular accident (CVA), unspecified mechanism (HCC) Yes          POC expires Unit limit Auth Expiration date PT/OT/ST + Visit Limit?   6/3/25 N/A 25 BOMN   25 BOMN                     Visit/Unit Tracking  AUTH Status:  Date  5 PN    BOMN Used 1 2 3 4 5 6 7 8 9 1 2    Remaining  7 6 5 4 3 2 1 0 0 7 6      Visit/Unit Tracking  AUTH Status:  Date  6/ RE    BOMN Used 3 4 5 6 1 2 3 4 5 6 7    Remaining  5 4 3 2 7 6 5 4 3 2 1      Visit/Unit Tracking  AUTH Status:  Date  7/ 7/ 7/9 7/10 7/15 7/16      BOMN Used 8 1 2 3 4 5 6 7       Remaining  0 7 6 5 4 3 2 1          Duration in weeks: 2-3x/week, 8 weeks  Plan of care beginning date: 25  Plan of care expiration date: 25  Re-Eval:  2025        Subjective: \"So now what, [facility]?\"    Objective: See treatment below.  Pt participated in skilled OT session focusing on vision re-training, visual endurance, /VM skills, attention to left side, working memory and multi step direction follow w/task sequencing for increased and safety during ADL/IADL tasks.    Thera Act:  Pt provided with verbal directions to compare/contrast images and match letter/number tiles correlating patterns, and life skills.  Shape book positioned in left peripheral field on slant board w/puzzle board positioned midline of pt.  Pt provided with verbal instructions to match shapes and fxl skill to correlating picture placing number to letter tile.      Assessment: Tolerated treatment well. Pt continues to require cues to scan into far left peripheral field to identify matching picture.  Pt required 1:1 assist for task sequencing and recall of directions.  " Patient would benefit from continued skilled OT.      Plan: Continued skilled OT per POC

## 2025-07-21 ENCOUNTER — OFFICE VISIT (OUTPATIENT)
Dept: OCCUPATIONAL THERAPY | Facility: CLINIC | Age: 75
End: 2025-07-21
Payer: MEDICARE

## 2025-07-21 ENCOUNTER — OFFICE VISIT (OUTPATIENT)
Dept: PHYSICAL THERAPY | Facility: CLINIC | Age: 75
End: 2025-07-21
Payer: MEDICARE

## 2025-07-21 DIAGNOSIS — I63.9 OCCIPITOTEMPORAL INFARCT, ACUTE (HCC): Primary | ICD-10-CM

## 2025-07-21 DIAGNOSIS — I63.9 CEREBROVASCULAR ACCIDENT (CVA), UNSPECIFIED MECHANISM (HCC): Primary | ICD-10-CM

## 2025-07-21 PROCEDURE — 97112 NEUROMUSCULAR REEDUCATION: CPT

## 2025-07-21 PROCEDURE — 97530 THERAPEUTIC ACTIVITIES: CPT

## 2025-07-21 NOTE — PROGRESS NOTES
"Occupational Therapy Daily Note:    Today's date: 2025  Patient name: Victor Manuel Mcmanus  : 1950  MRN: [id number]  Referring provider: Luis E Mcdaniels MD  Dx:   Encounter Diagnosis   Name Primary?    Cerebrovascular accident (CVA), unspecified mechanism (HCC) Yes            POC expires Unit limit Auth Expiration date PT/OT/ST + Visit Limit?   6/3/25 N/A 25 BOMN   25 BOMN                     Visit/Unit Tracking  AUTH Status:  Date  5 PN    BOMN Used 1 2 3 4 5 6 7 8 9 1 2    Remaining  7 6 5 4 3 2 1 0 0 7 6      Visit/Unit Tracking  AUTH Status:  Date  6/ RE    BOMN Used 3 4 5 6 1 2 3 4 5 6 7    Remaining  5 4 3 2 7 6 5 4 3 2 1      Visit/Unit Tracking  AUTH Status:  Date  7/ 7/7 7/9 7/10 7/15 7/16 7/21     BOMN Used 8 1 2 3 4 5 6 7 8      Remaining  0 7 6 5 4 3 2 1 0         Duration in weeks: 2-3x/week, 8 weeks  Plan of care beginning date: 25  Plan of care expiration date: 25  Re-Eval:  2025        Subjective: \"You tell me, is this where I got it?\"    Objective: See treatment below.  Pt participated in skilled OT session focusing on vision re-training, visual endurance, /VM skills, attention to left side, working memory and multi step direction follow w/task sequencing for increased and safety during ADL/IADL tasks.    Thera Act:  Pt seated at table with multi matrix cubes scattered on table (25% on R side and 75% on L side). With shape side facing up, colored cubes with white cube on top, pt instructed to look at card positioned on L side to follow through figure ground card pictures.  Once going in order, figuring out piece, then finding on table in scatter, picking up and then moving to open spot. Pt trouble with direction following of \"open spot\", therapist then switching colroed pieces to blank to decrease confusion btu continue with 3 step direction following. Pt " able to work through entire sheet with maximal difficulty to stay on track and locate items on L side of table.     Assessment: Tolerated treatment well. Pt continues to require cues to scan into far left peripheral field to identify matching picture.  Pt required 1:1 assist for task sequencing and recall of directions as well as following next design on card discussing proper way of reading through a card from L > R and then going to next line in order.  Patient would benefit from continued skilled OT.      Plan: Continued skilled OT per POC

## 2025-07-21 NOTE — PROGRESS NOTES
"Daily Note     Today's date: 2025  Patient name: Victor Manuel Mcmanus  : 1950  MRN: [id number]  Referring provider: Luis E Mcdaniels MD  Dx:   Encounter Diagnosis     ICD-10-CM    1. Occipitotemporal infarct, acute (HCC)  I63.9           Start Time: 930  Stop Time: 1015  Total time in clinic (min): 45 minutes    Subjective: pt reports \"things are still hard after my stroke\"      Objective: See treatment diary below      Assessment: Tolerated treatment well. Patient would benefit from continued PT session continued to be focused on L sided neglect and in attention as well as environmental navigation w inc attention to L side. Pt continues to require cueing when attention is required far to the left however is improving at self cueing noting \"if I can't find it it is probably on the left\" . Re-orientation to midline of natalie required as pt tens to step over only the right side of natalie. With re-orientation, form improves.       Plan: Continue per plan of care.      Short Term Goal Expiration Date:(25)  Long Term Goal Expiration Date: (25)  POC Expiration Date: (25)        POC expires Unit limit Auth Expiration date PT/OT/ST + Visit Limit?   25 6 neuro 25 BOMN                           Visit/Unit Tracking  AUTH Status:  Date 6/25 6/30 7/2 7/3 7/7 7/9 7/14 7/16 7/21 6/18 6/23   None PN every 10 Used 7/10 8/10 910 10/10 1/10 2/10 310 410 510 5/10 6/10    Remaining                     Precautions hx CVA, afb, HTN DM2, left side neglect        Manuals                                    Neuro Re-Ed         Multi directional stepping        Uneven surface        TM SOLO  1.5 mph  6% grade  W bean bags on tread color call out to assess what pt sees  X10 min SOLO  1.5 mph  6% grade  X10 min SOLO  1.5 mph  6% grade  W bean bags on tread color call out to assess what pt sees  X10 min HIGT SOLO  6% incline  1.5 mph  X10 min  RPE 7/10  HR 79 bpm spo2 97% HIGT SOLO  6% " incline  1.5 mph  X10 min  Playing card board on L side of pt to match cards to   Environment navigation/scanning 3 blaze pods on mirror one on table to pts left navigate to pod over 3 6'' hurdles  2 min x3 rounds    Cone weaving to blaze pod that's on multi directional stepping navigation  2 min x2    Beach ball toss  X5 min emphasis on left scanning for ball SOLO  Small cones (all) dispersed across 18 ft span pt navigates through w ball to place ball on large cone at the end  X16 laps    Walking HT spotting playing cards  120 ft  X4 laps SOLO  Small cones (all) dispersed across 18 ft span pt navigates through w ball to place ball on large cone at the end  X16 laps SOLO  Dots and river rocks dispersed, pt given  3 colors he can step on to get across  X6 laps    SOLO standing on airex  W balloon racket volley  X5 min SOLO  Dots and  airex ovals dispersed, pt given  3 colors he can step on to get across  X6 laps    6 and 8'' hurdles w 4 blaze pods  10 taps x2 rounds fwd  X1 round lateral   Step up        hurdles        Dual task        Ther Ex                                                                        Ther Activity                        Gait Training        Treadmill                 Modalities

## 2025-07-23 ENCOUNTER — OFFICE VISIT (OUTPATIENT)
Dept: PHYSICAL THERAPY | Facility: CLINIC | Age: 75
End: 2025-07-23
Payer: MEDICARE

## 2025-07-23 ENCOUNTER — OFFICE VISIT (OUTPATIENT)
Dept: OCCUPATIONAL THERAPY | Facility: CLINIC | Age: 75
End: 2025-07-23
Payer: MEDICARE

## 2025-07-23 DIAGNOSIS — I63.9 CEREBROVASCULAR ACCIDENT (CVA), UNSPECIFIED MECHANISM (HCC): Primary | ICD-10-CM

## 2025-07-23 DIAGNOSIS — I63.9 OCCIPITOTEMPORAL INFARCT, ACUTE (HCC): Primary | ICD-10-CM

## 2025-07-23 PROCEDURE — 97112 NEUROMUSCULAR REEDUCATION: CPT

## 2025-07-23 NOTE — PROGRESS NOTES
"Occupational Therapy Daily Note:    Today's date: 2025  Patient name: Victor Manuel Mcmanus  : 1950  MRN: [id number]  Referring provider: Luis E Mcdaniels MD  Dx:   Encounter Diagnosis   Name Primary?    Cerebrovascular accident (CVA), unspecified mechanism (HCC) Yes          POC expires Unit limit Auth Expiration date PT/OT/ST + Visit Limit?   6/3/25 N/A 25 BOMN   25 BOMN                     Visit/Unit Tracking  AUTH Status:  Date  5 PN    BOMN Used 1 2 3 4 5 6 7 8 9 1 2    Remaining  7 6 5 4 3 2 1 0 0 7 6      Visit/Unit Tracking  AUTH Status:  Date  6/ RE  6   BOMN Used 3 4 5 6 1 2 3 4 5 6 7    Remaining  5 4 3 2 7 6 5 4 3 2 1      Visit/Unit Tracking  AUTH Status:  Date  7/ 7 7/9 7/10 7/15 7/16 7/21 7/23    BOMN Used 8 1 2 3 4 5 6 7 8 9     Remaining  0 7 6 5 4 3 2 1 0 0        Duration in weeks: 2-3x/week, 8 weeks  Plan of care beginning date: 25  Plan of care expiration date: 25  Re-Eval:  2025        Subjective: \"Why don't I understand this?\"    Objective: See treatment below.  Pt participated in skilled OT session focusing on vision re-training, visual endurance, /VM skills, attention to left side, working memory and multi step direction follow w/task sequencing for increased and safety during ADL/IADL tasks.    Neuro Re-ed: Pt participated in constructional task to assemble QBits pattern following template card focusing on , spatial orientation, visual attention, visual processing and scanning/tracking to identify pattern.  Pt then retrieved dice, position and place on template board creating pattern.     Assessment: Tolerated treatment well. Frequent cues required to scan to left to locate cubes. Pt completed 2 patterns, 1 requiring >75% assist for task processing and direction follow and 1 indep.  Pt tearful this session due to family matters impeding attention " and processing of task.  Patient would benefit from continued skilled OT.    Plan: Continued skilled OT per POC

## 2025-07-23 NOTE — PROGRESS NOTES
Daily Note     Today's date: 2025  Patient name: Victor Manuel Mcmanus  : 1950  MRN: [id number]  Referring provider: Luis E Mcdaniels MD  Dx:   Encounter Diagnosis     ICD-10-CM    1. Occipitotemporal infarct, acute (HCC)  I63.9           Start Time: 0800  Stop Time: 0843  Total time in clinic (min): 43 minutes    Subjective: pt reports he is doing well today no sig changes to report       Objective: See treatment diary below      Assessment: Tolerated treatment well. Patient would benefit from continued PT HIGT on TM performed for improved continued neuro plasticity and motor learning. Some dec LLE foot clearance w toe scuffing noted on TM today. Verbal cues to clear the TM belt however min improvement when cues removed. Pt does a good job with scanning fro numbers as he walks down the [address] on both the right and left sides with cueing at the end of the hallways to turn around and keep looking for the next card.       Plan: Continue per plan of care.      Short Term Goal Expiration Date:(25)  Long Term Goal Expiration Date: (25)  POC Expiration Date: (25)        POC expires Unit limit Auth Expiration date PT/OT/ST + Visit Limit?   25 6 neuro 25 BOMN                           Visit/Unit Tracking  AUTH Status:  Date 6/25 6/30 7/2 7/3 7/7 7/9 7/14 7/16 7/21 7/23 6/23   None PN every 10 Used 7/10 8/10 9/10 10/10 1/10 2/10 3/10 4/10 5/10 6/10 6/10    Remaining                     Precautions hx CVA, afb, HTN DM2, left side neglect        Manuals                                    Neuro Re-Ed         Multi directional stepping        Uneven surface        TM SOLO  1.5 mph  6% grade  Bean bags added to Tm for clearance as well as spatial navigation  X10 min SOLO  1.5 mph  6% grade  X10 min SOLO  1.5 mph  6% grade  W bean bags on tread color call out to assess what pt sees  X10 min HIGT SOLO  6% incline  1.5 mph  X10 min  RPE 7/10  HR 79 bpm spo2 97% HIGT SOLO  6%  incline  1.5 mph  X10 min  Playing card board on L side of pt to match cards to   Environment navigation/scanning SOLO  Walking while keeping balloon up over head 1/4 track x 8 laps    Number search in hallway  20 cards x1 round SOLO  Small cones (all) dispersed across 18 ft span pt navigates through w ball to place ball on large cone at the end  X16 laps    Walking HT spotting playing cards  120 ft  X4 laps SOLO  Small cones (all) dispersed across 18 ft span pt navigates through w ball to place ball on large cone at the end  X16 laps SOLO  Dots and river rocks dispersed, pt given  3 colors he can step on to get across  X6 laps    SOLO standing on airex  W balloon racket volley  X5 min SOLO  Dots and  airex ovals dispersed, pt given  3 colors he can step on to get across  X6 laps    6 and 8'' hurdles w 4 blaze pods  10 taps x2 rounds fwd  X1 round lateral   Step up        hurdles        Dual task        Ther Ex                                                                        Ther Activity                        Gait Training        Treadmill                 Modalities

## 2025-07-24 ENCOUNTER — OFFICE VISIT (OUTPATIENT)
Dept: OCCUPATIONAL THERAPY | Facility: CLINIC | Age: 75
End: 2025-07-24
Payer: MEDICARE

## 2025-07-24 DIAGNOSIS — I63.9 CEREBROVASCULAR ACCIDENT (CVA), UNSPECIFIED MECHANISM (HCC): Primary | ICD-10-CM

## 2025-07-24 PROCEDURE — 97112 NEUROMUSCULAR REEDUCATION: CPT

## 2025-07-24 NOTE — PROGRESS NOTES
"Occupational Therapy Daily Note:    Today's date: 2025  Patient name: Victor Manuel Mcmanus  : 1950  MRN: [id number]  Referring provider: Luis E Mcdaniels MD  Dx:   Encounter Diagnosis   Name Primary?    Cerebrovascular accident (CVA), unspecified mechanism (HCC) Yes            POC expires Unit limit Auth Expiration date PT/OT/ST + Visit Limit?   6/3/25 N/A 25 BOMN   25 BOMN                     Visit/Unit Tracking  AUTH Status:  Date  5 PN    BOMN Used 1 2 3 4 5 6 7 8 9 1 2    Remaining  7 6 5 4 3 2 1 0 0 7 6      Visit/Unit Tracking  AUTH Status:  Date  6/ RE  6   BOMN Used 3 4 5 6 1 2 3 4 5 6 7    Remaining  5 4 3 2 7 6 5 4 3 2 1      Visit/Unit Tracking  AUTH Status:  Date  7/2 7/7 7/9 7/10 7/15 7/16 7/21 7/23 7/24   BOMN Used 8 1 2 3 4 5 6 7 8 9 10    Remaining  0 7 6 5 4 3 2 1 0 0 0       Duration in weeks: 2-3x/week, 8 weeks  Plan of care beginning date: 25  Plan of care expiration date: 25  Re-Eval:  2025        *arrived 15 min late to session*     Subjective: \"Hmmm, I think these go here but I am not sure\"    Objective: See treatment below.  Pt participated in skilled OT session focusing on vision re-training, visual endurance, /VM skills, attention to left side, working memory and multi step direction follow w/task sequencing for increased and safety during ADL/IADL tasks.    Neuro Re-ed: Pt participated in constructional task to assemble Parquetry puzzle pattern following template card positioned on slant board focusing on , spatial orientation, visual attention, visual processing and scanning/tracking to identify pattern. Pt instructed to follow design on R side, find proper piece positioned on L side of table and place into correct spot on velcro board. Upgraded task moving design board to L side for increased scanning and awareness to L side.     Assessment: " Tolerated treatment well. Pt required maximal assist to get started with task, then able to complete 75% of task indep. Once Pt getting to top L quadrant of design, moderate difficulty requiring verbal cues to scan to design card as well as velcro board to place into correct spot. Minimal cues required to scan to left to locate pieces. Patient would benefit from continued skilled OT.    Plan: Continued skilled OT per POC- Re assessment next session

## 2025-07-28 ENCOUNTER — OFFICE VISIT (OUTPATIENT)
Dept: PHYSICAL THERAPY | Facility: CLINIC | Age: 75
End: 2025-07-28
Payer: MEDICARE

## 2025-07-28 ENCOUNTER — EVALUATION (OUTPATIENT)
Dept: OCCUPATIONAL THERAPY | Facility: CLINIC | Age: 75
End: 2025-07-28
Payer: MEDICARE

## 2025-07-28 DIAGNOSIS — I63.9 CEREBROVASCULAR ACCIDENT (CVA), UNSPECIFIED MECHANISM (HCC): Primary | ICD-10-CM

## 2025-07-28 DIAGNOSIS — I63.9 OCCIPITOTEMPORAL INFARCT, ACUTE (HCC): Primary | ICD-10-CM

## 2025-07-28 PROCEDURE — 97530 THERAPEUTIC ACTIVITIES: CPT

## 2025-07-28 PROCEDURE — 97112 NEUROMUSCULAR REEDUCATION: CPT

## 2025-07-28 PROCEDURE — 97168 OT RE-EVAL EST PLAN CARE: CPT

## 2025-07-30 ENCOUNTER — OFFICE VISIT (OUTPATIENT)
Dept: OCCUPATIONAL THERAPY | Facility: CLINIC | Age: 75
End: 2025-07-30
Payer: MEDICARE

## 2025-07-30 ENCOUNTER — EVALUATION (OUTPATIENT)
Dept: PHYSICAL THERAPY | Facility: CLINIC | Age: 75
End: 2025-07-30
Payer: MEDICARE

## 2025-07-30 DIAGNOSIS — I63.9 CEREBROVASCULAR ACCIDENT (CVA), UNSPECIFIED MECHANISM (HCC): Primary | ICD-10-CM

## 2025-07-30 DIAGNOSIS — I63.9 OCCIPITOTEMPORAL INFARCT, ACUTE (HCC): Primary | ICD-10-CM

## 2025-07-30 PROCEDURE — 97112 NEUROMUSCULAR REEDUCATION: CPT

## 2025-08-04 ENCOUNTER — OFFICE VISIT (OUTPATIENT)
Dept: OCCUPATIONAL THERAPY | Facility: CLINIC | Age: 75
End: 2025-08-04
Payer: MEDICARE

## 2025-08-04 DIAGNOSIS — I63.9 CEREBROVASCULAR ACCIDENT (CVA), UNSPECIFIED MECHANISM (HCC): Primary | ICD-10-CM

## 2025-08-04 PROCEDURE — 97112 NEUROMUSCULAR REEDUCATION: CPT

## 2025-08-06 ENCOUNTER — OFFICE VISIT (OUTPATIENT)
Dept: OCCUPATIONAL THERAPY | Facility: CLINIC | Age: 75
End: 2025-08-06
Payer: MEDICARE

## 2025-08-06 DIAGNOSIS — I63.9 CEREBROVASCULAR ACCIDENT (CVA), UNSPECIFIED MECHANISM (HCC): Primary | ICD-10-CM

## 2025-08-06 PROCEDURE — 97112 NEUROMUSCULAR REEDUCATION: CPT

## 2025-08-11 ENCOUNTER — OFFICE VISIT (OUTPATIENT)
Dept: OCCUPATIONAL THERAPY | Facility: CLINIC | Age: 75
End: 2025-08-11
Payer: MEDICARE

## 2025-08-13 ENCOUNTER — OFFICE VISIT (OUTPATIENT)
Dept: OCCUPATIONAL THERAPY | Facility: CLINIC | Age: 75
End: 2025-08-13
Payer: MEDICARE

## 2025-08-18 ENCOUNTER — OFFICE VISIT (OUTPATIENT)
Dept: OCCUPATIONAL THERAPY | Facility: CLINIC | Age: 75
End: 2025-08-18
Payer: MEDICARE

## 2025-08-18 DIAGNOSIS — I63.9 CEREBROVASCULAR ACCIDENT (CVA), UNSPECIFIED MECHANISM (HCC): Primary | ICD-10-CM

## 2025-08-18 PROCEDURE — 97530 THERAPEUTIC ACTIVITIES: CPT

## 2025-08-20 ENCOUNTER — OFFICE VISIT (OUTPATIENT)
Dept: OCCUPATIONAL THERAPY | Facility: CLINIC | Age: 75
End: 2025-08-20
Payer: MEDICARE

## 2025-08-20 DIAGNOSIS — I63.9 CEREBROVASCULAR ACCIDENT (CVA), UNSPECIFIED MECHANISM (HCC): Primary | ICD-10-CM

## 2025-08-20 PROCEDURE — 97530 THERAPEUTIC ACTIVITIES: CPT
